# Patient Record
Sex: MALE | Race: WHITE | NOT HISPANIC OR LATINO | Employment: FULL TIME | URBAN - METROPOLITAN AREA
[De-identification: names, ages, dates, MRNs, and addresses within clinical notes are randomized per-mention and may not be internally consistent; named-entity substitution may affect disease eponyms.]

---

## 2023-03-13 LAB
ALANINE AMINOTRANSFERASE (SGPT) (U/L) IN SER/PLAS: 29 U/L (ref 10–52)
ALBUMIN (G/DL) IN SER/PLAS: 4.4 G/DL (ref 3.4–5)
ALKALINE PHOSPHATASE (U/L) IN SER/PLAS: 64 U/L (ref 33–136)
ANION GAP IN SER/PLAS: 12 MMOL/L (ref 10–20)
APPEARANCE, URINE: CLEAR
ASPARTATE AMINOTRANSFERASE (SGOT) (U/L) IN SER/PLAS: 27 U/L (ref 9–39)
BILIRUBIN TOTAL (MG/DL) IN SER/PLAS: 2.7 MG/DL (ref 0–1.2)
BILIRUBIN, URINE: NEGATIVE
BLOOD, URINE: NEGATIVE
CALCIDIOL (25 OH VITAMIN D3) (NG/ML) IN SER/PLAS: 58 NG/ML
CALCIUM (MG/DL) IN SER/PLAS: 9.8 MG/DL (ref 8.6–10.6)
CARBON DIOXIDE, TOTAL (MMOL/L) IN SER/PLAS: 31 MMOL/L (ref 21–32)
CHLORIDE (MMOL/L) IN SER/PLAS: 99 MMOL/L (ref 98–107)
CHOLESTEROL (MG/DL) IN SER/PLAS: 152 MG/DL (ref 0–199)
CHOLESTEROL IN HDL (MG/DL) IN SER/PLAS: 52.6 MG/DL
CHOLESTEROL/HDL RATIO: 2.9
COLOR, URINE: YELLOW
CREATININE (MG/DL) IN SER/PLAS: 0.95 MG/DL (ref 0.5–1.3)
ERYTHROCYTE DISTRIBUTION WIDTH (RATIO) BY AUTOMATED COUNT: 13.5 % (ref 11.5–14.5)
ERYTHROCYTE MEAN CORPUSCULAR HEMOGLOBIN CONCENTRATION (G/DL) BY AUTOMATED: 33.3 G/DL (ref 32–36)
ERYTHROCYTE MEAN CORPUSCULAR VOLUME (FL) BY AUTOMATED COUNT: 100 FL (ref 80–100)
ERYTHROCYTES (10*6/UL) IN BLOOD BY AUTOMATED COUNT: 5.15 X10E12/L (ref 4.5–5.9)
FERRITIN (UG/LL) IN SER/PLAS: 107 UG/L (ref 20–300)
GFR MALE: >90 ML/MIN/1.73M2
GLUCOSE (MG/DL) IN SER/PLAS: 84 MG/DL (ref 74–99)
GLUCOSE, URINE: NEGATIVE MG/DL
HEMATOCRIT (%) IN BLOOD BY AUTOMATED COUNT: 51.3 % (ref 41–52)
HEMOGLOBIN (G/DL) IN BLOOD: 17.1 G/DL (ref 13.5–17.5)
KETONES, URINE: ABNORMAL MG/DL
LDL: 81 MG/DL (ref 0–99)
LEUKOCYTE ESTERASE, URINE: NEGATIVE
LEUKOCYTES (10*3/UL) IN BLOOD BY AUTOMATED COUNT: 5.2 X10E9/L (ref 4.4–11.3)
NITRITE, URINE: NEGATIVE
NRBC (PER 100 WBCS) BY AUTOMATED COUNT: 0 /100 WBC (ref 0–0)
PH, URINE: 6 (ref 5–8)
PLATELETS (10*3/UL) IN BLOOD AUTOMATED COUNT: 261 X10E9/L (ref 150–450)
POTASSIUM (MMOL/L) IN SER/PLAS: 4.3 MMOL/L (ref 3.5–5.3)
PROSTATE SPECIFIC AG (NG/ML) IN SER/PLAS: 0.55 NG/ML (ref 0–4)
PROTEIN TOTAL: 6.9 G/DL (ref 6.4–8.2)
PROTEIN, URINE: NEGATIVE MG/DL
SODIUM (MMOL/L) IN SER/PLAS: 138 MMOL/L (ref 136–145)
SPECIFIC GRAVITY, URINE: 1.02 (ref 1–1.03)
THYROTROPIN (MIU/L) IN SER/PLAS BY DETECTION LIMIT <= 0.05 MIU/L: 4.11 MIU/L (ref 0.44–3.98)
THYROXINE (T4) FREE (NG/DL) IN SER/PLAS: 1.17 NG/DL (ref 0.78–1.48)
TRIGLYCERIDE (MG/DL) IN SER/PLAS: 93 MG/DL (ref 0–149)
URATE (MG/DL) IN SER/PLAS: 5.7 MG/DL (ref 4–7.5)
UREA NITROGEN (MG/DL) IN SER/PLAS: 21 MG/DL (ref 6–23)
UROBILINOGEN, URINE: <2 MG/DL (ref 0–1.9)
VLDL: 19 MG/DL (ref 0–40)

## 2023-04-04 LAB
TESTOSTERONE FREE (CHAN): 90.5 PG/ML (ref 35–155)
TESTOSTERONE,TOTAL,LC-MS/MS: 374 NG/DL (ref 250–1100)

## 2023-10-16 DIAGNOSIS — I10 HYPERTENSION, ESSENTIAL: Primary | ICD-10-CM

## 2023-10-16 DIAGNOSIS — F52.21 ED (ERECTILE DYSFUNCTION) OF NON-ORGANIC ORIGIN: ICD-10-CM

## 2023-10-16 DIAGNOSIS — R79.89 LOW TESTOSTERONE IN MALE: ICD-10-CM

## 2023-10-16 RX ORDER — TESTOSTERONE 20.25 MG/1.25G
20.25 GEL TOPICAL 4 TIMES DAILY
Qty: 150 G | Refills: 5 | Status: SHIPPED | OUTPATIENT
Start: 2023-10-16 | End: 2023-11-01 | Stop reason: SDUPTHER

## 2023-10-16 RX ORDER — LISINOPRIL AND HYDROCHLOROTHIAZIDE 20; 25 MG/1; MG/1
1 TABLET ORAL DAILY
Qty: 30 TABLET | Refills: 11 | Status: SHIPPED | OUTPATIENT
Start: 2023-10-16 | End: 2024-10-15

## 2023-10-16 RX ORDER — TADALAFIL 20 MG/1
20 TABLET ORAL ONCE AS NEEDED
Qty: 10 TABLET | Refills: 6 | Status: SHIPPED | OUTPATIENT
Start: 2023-10-16 | End: 2023-12-25

## 2023-11-01 DIAGNOSIS — R79.89 LOW TESTOSTERONE IN MALE: ICD-10-CM

## 2023-11-01 RX ORDER — TESTOSTERONE 20.25 MG/1.25G
GEL TOPICAL
Qty: 150 G | Refills: 5 | Status: SHIPPED | OUTPATIENT
Start: 2023-11-01 | End: 2024-03-22 | Stop reason: SDUPTHER

## 2023-11-21 ENCOUNTER — TELEMEDICINE (OUTPATIENT)
Dept: NEUROLOGY | Facility: CLINIC | Age: 63
End: 2023-11-21
Payer: COMMERCIAL

## 2023-11-21 DIAGNOSIS — G43.709 CHRONIC MIGRAINE W/O AURA W/O STATUS MIGRAINOSUS, NOT INTRACTABLE: Primary | ICD-10-CM

## 2023-11-21 DIAGNOSIS — F07.81 POST CONCUSSION SYNDROME: ICD-10-CM

## 2023-11-21 PROCEDURE — 99215 OFFICE O/P EST HI 40 MIN: CPT | Performed by: STUDENT IN AN ORGANIZED HEALTH CARE EDUCATION/TRAINING PROGRAM

## 2023-11-21 RX ORDER — TOPIRAMATE 25 MG/1
TABLET ORAL
Qty: 60 TABLET | Refills: 4 | Status: SHIPPED | OUTPATIENT
Start: 2023-11-21 | End: 2024-04-15 | Stop reason: WASHOUT

## 2023-11-21 RX ORDER — RIZATRIPTAN BENZOATE 10 MG/1
10 TABLET ORAL ONCE AS NEEDED
Qty: 10 TABLET | Refills: 6 | Status: SHIPPED | OUTPATIENT
Start: 2023-11-21 | End: 2024-04-15 | Stop reason: WASHOUT

## 2023-11-21 NOTE — PROGRESS NOTES
Virtual or Telephone Consent    An interactive audio and video telecommunication system which permits real time communications between the patient (at the originating site) and provider (at the distant site) was utilized to provide this telehealth service.   Verbal consent was requested and obtained from Abhishek Olson on this date, 11/21/23 for a telehealth visit.     Subjective   Abhishek Olson is a 62 y.o.   male who is being followed for persistent headache attributed to mild traumatic injury to the head and post concussive syndrome.    Since last seen, patient states that headaches have not changes. Notes vivid dreams with amitriptyline and occasional dry mouth. Has been helping with sleep, but groggy in the morning. Has been having mood swings and often more tearful. Loss of libido. Has been seeing mental health counseling and started on desvenlafaxine. Has decreased work hours to 40 hours per week and no longer traveling with team. Zolmitriptan has not been effective, but no side effect. Notes that he had 2 weeks without headache, however gradually returned to daily.    Current Outpatient Medications   Medication Instructions    lisinopriL-hydrochlorothiazide 20-25 mg tablet 1 tablet, oral, Daily    tadalafil (CIALIS) 20 mg, oral, Once as needed    testosterone 20.25 mg/1.25 gram (1.62 %) gel in metered-dose pump As directed    ZOLMitriptan (ZOMIG ZMT) 5 mg, oral, Once as needed, May repeat in 2 hours if unresolved. Do not exceed 10 mg in 24 hours.       Assessment/Plan   Patient with persistent headache attributed to mild traumatic injury to the head and post concussive syndrome. Headaches overall unchanged from last visit. Having side effect to low dose amitriptyline, without clear benefit, thus will discontinue at this time. Per our discussion, will start topiramate. For breakthrough headaches, will switch from zolmitriptan to rizatriptan. May benefit from neuropsych evaluation. Patient has given  permission to reach out Dr. Gary Tidwell (neuropsych) to discuss evaluation.     - discontinue amitriptyline, zolmitriptan, and indomethacin  - start topiramate 50mg at bedtime  - start rizatriptan 10mg PRN  - follow up in 3 months    I personally spent 47 minutes today, exclusive of procedures, providing care for this patient, including preparation, face to face time, documentation and other services such as review of medical records, diagnostic result, patient education, counseling, coordination of care as specified in the encounter.

## 2023-11-21 NOTE — PATIENT INSTRUCTIONS
Discontinue amitriptyline, zolmitriptan, and indomethacin    Will start topiramate for headache prevention and rizatriptan for breakthrough headache treatment.     Consider neuropsych evaluation. Will discuss options with Dr. Gary Tidwell     Follow up 3 months  ______________________________  You have been prescribed topiramate 25mg tablets, with the goal to titrate up to 2 tablets (50 mg) per day.    For the first week, take 1 tablet (25mg) in the evening.   If no side effects and tolerating well, increase to 2 tablets (50mg) in the evening.    It could take up the 3 months to start seeing improvement in your headaches.     The most common side effects include numbness/tingling in your fingers/toes/around mouth, loss of appetite/weight loss, metallic taste to carbonated beverages, and some cognitive slowing. More serious side effects include kidney stones and worsening depression. If any of these symptoms are experienced and not tolerated, please contact my office to discuss alternative options.  ________________________  You have been prescribed a medication called Rizatriptan at a dose of 10mg. This medication is to be taken as needed to stop a migraine. It is most effective if taken at onset of headache, with a goal of completely resolving a migraine within 2 hours. If you do not have complete resolution of headache within 2 hours, take a second tablet. Max daily dose is 3 tablets (30mg).    Please take this medication less than 10 times per month. Taken too often, there is a chance of developing medication overuse headache, which would result in a higher migraine frequency.     Common side effects include light-headed sensation, drowsiness, and restlessness. Another side effect is the sensation of chest/neck tightness that can last minutes to a few hours. You should not have any difficulty breathing or irregular heart rhythm with this side effect, and it self resolves.    Do not take this medication if  you have uncontrolled blood pressure or a history of heart attack, as it can cause vasospasm.     If you do not tolerate this medication, please discontinue its use, and we can discuss alternative acute medications at your upcoming appointment.

## 2023-12-13 ENCOUNTER — PROCEDURE VISIT (OUTPATIENT)
Dept: PSYCHOLOGY | Facility: CLINIC | Age: 63
End: 2023-12-13
Payer: COMMERCIAL

## 2023-12-13 DIAGNOSIS — G43.109 MIGRAINE WITH AURA AND WITHOUT STATUS MIGRAINOSUS, NOT INTRACTABLE: ICD-10-CM

## 2023-12-13 DIAGNOSIS — F07.81 POST CONCUSSION SYNDROME: ICD-10-CM

## 2023-12-13 DIAGNOSIS — F41.9 ANXIETY: ICD-10-CM

## 2023-12-13 DIAGNOSIS — F54 PSYCHOLOGICAL FACTOR AFFECTING PHYSICAL CONDITION: ICD-10-CM

## 2023-12-13 DIAGNOSIS — S06.0X0D CONCUSSION WITHOUT LOSS OF CONSCIOUSNESS, SUBSEQUENT ENCOUNTER: Primary | ICD-10-CM

## 2023-12-13 PROBLEM — S06.0X0A CONCUSSION WITH NO LOSS OF CONSCIOUSNESS: Status: ACTIVE | Noted: 2023-12-13

## 2023-12-13 PROCEDURE — 96139 PSYCL/NRPSYC TST TECH EA: CPT | Performed by: CLINICAL NEUROPSYCHOLOGIST

## 2023-12-13 PROCEDURE — 96116 NUBHVL XM PHYS/QHP 1ST HR: CPT | Mod: AH,GC | Performed by: CLINICAL NEUROPSYCHOLOGIST

## 2023-12-13 PROCEDURE — 96139 PSYCL/NRPSYC TST TECH EA: CPT | Mod: AH,GC | Performed by: CLINICAL NEUROPSYCHOLOGIST

## 2023-12-13 PROCEDURE — 96138 PSYCL/NRPSYC TECH 1ST: CPT | Performed by: CLINICAL NEUROPSYCHOLOGIST

## 2023-12-13 PROCEDURE — 96133 NRPSYC TST EVAL PHYS/QHP EA: CPT | Mod: AH,GC | Performed by: CLINICAL NEUROPSYCHOLOGIST

## 2023-12-13 PROCEDURE — 96132 NRPSYC TST EVAL PHYS/QHP 1ST: CPT | Mod: AH,GC | Performed by: CLINICAL NEUROPSYCHOLOGIST

## 2023-12-13 PROCEDURE — 96116 NUBHVL XM PHYS/QHP 1ST HR: CPT | Performed by: CLINICAL NEUROPSYCHOLOGIST

## 2023-12-13 PROCEDURE — 96138 PSYCL/NRPSYC TECH 1ST: CPT | Mod: AH,GC | Performed by: CLINICAL NEUROPSYCHOLOGIST

## 2023-12-13 PROCEDURE — 96132 NRPSYC TST EVAL PHYS/QHP 1ST: CPT | Performed by: CLINICAL NEUROPSYCHOLOGIST

## 2023-12-13 PROCEDURE — 96133 NRPSYC TST EVAL PHYS/QHP EA: CPT | Performed by: CLINICAL NEUROPSYCHOLOGIST

## 2023-12-13 RX ORDER — INDOMETHACIN 75 MG/1
75 CAPSULE, EXTENDED RELEASE ORAL
COMMUNITY
Start: 2023-11-02 | End: 2024-04-15 | Stop reason: WASHOUT

## 2023-12-13 RX ORDER — ESCITALOPRAM OXALATE 20 MG/1
1 TABLET ORAL DAILY
COMMUNITY
Start: 2018-05-03 | End: 2024-04-19 | Stop reason: SDUPTHER

## 2023-12-13 RX ORDER — VENLAFAXINE HYDROCHLORIDE 75 MG/1
75 CAPSULE, EXTENDED RELEASE ORAL DAILY
COMMUNITY
Start: 2023-11-19
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
## 2023-12-13 NOTE — PROGRESS NOTES
NEUROPSYCHOLOGICAL DATA SUMMARY    Name: Abhishek Olson  : 1960  MRN: [id number]    Date of Service: 23    Age: 62 y.o.  Race:   Education: 16  Preferred Hand: RH  Examiner: Gary Tidwell  Psychometrist:  Natalia Callahan    Descriptors:    T-Score Standard Score Z-Score Scaled Score %ile Rank   Exceptionally High > 70 > 130 > 2.0 > 16 > 98   Above Average 64-69 120-129 1.4-1.9 15 91-97   High Average 57-63 110-119 0.7-1.3 12-14 75-90   Average 43-56  0.6 to -0.6 8-11 25-74   Low Average 37-42 80-89 -1.3 to -0.7 6-7 9-24   Below Average 30-36 70-79 -2.0 to -1.4 4-5 2-8   Exceptionally Low < 30 < 70 < -2.0 < 4 < 2     WTAR 1 Raw  Std. Sc  %ile   Total Correct  39  110  75   FSIQ-Est  -  113  81     Wechsler Adult Intelligence Scale - IV 1   Percep Std-equi  %ile   Matrix Reasoning 85  16   Wrking Std-equi  %ile   Digit Span 100  50   Letter Number Sequence 95  37   ProcSpd Std-equi  %ile   Symbol Search 100  50   Coding 100  50   Quotient Index  %ile   Working Memory Index 97  42   Processing Speed index 100  50      Raw  Std-equi  %ile   Trail Making 2 Part A 33 (0) 99  47                Part B 56 (0) 110  75   COWA 2                Phonemic    37    94    34                 Semantic  19  102  55     HVLT-R 1 (Form 1)       Raw  Std-equiv  %ile   Trial 1 5  79  8   Trial 2 7  78  7   Trial 3 8  73  4   Total Recall  20  73  4   Learning  3       Trial 4 (Delayed Recall) 4  <55  <1   % Retained  50%  57  <1   T+  7  See Discrim. Index   F+ 2  See Discrim. Index   Discrimination Index  7  <55  <1     BVMT-R 1(Form 1)       Raw  Std-equiv  %ile   Trial 1 3  82  12   Trial 2 5  78  7   Trial 3 6  76  5   Total Recall  14  76  5   Learning  3  94  34   Delayed Recall  6  82  12   % Retained 100%  -  >16   T+  (F+) 4 (0)  See Discrim. Index   Discrimination Index  4  -  6-10   Copy Score (Optional) 12          Raw       TOMM 1 43  50  50   Dots 1 8  4.8 v. 1.6  2e   RDS 1 9  RDS-R  13        Raw   T  %ile   BDI-II 1               24       STAI 1                         State 56  71  96     Trait 62  82  100     Post Concussive Scale    (Pre) 54(15)                                           (Post) 60(17)     Test Normative References:  Test Manual  FRANCISCO Llanos., FRANCISCO Llanos, & Psychological [employer] (2004). Revised comprehensive norms for an expanded [place] battery: Demographically adjusted neuropsychological norms for  and  adults, professional manual. [employer] [place]: Psychological Assessment Resources

## 2024-02-09 DIAGNOSIS — Z00.00 VISIT FOR ANNUAL HEALTH EXAMINATION: ICD-10-CM

## 2024-02-13 ENCOUNTER — LAB REQUISITION (OUTPATIENT)
Dept: LAB | Facility: HOSPITAL | Age: 64
End: 2024-02-13
Payer: COMMERCIAL

## 2024-02-13 ENCOUNTER — HOSPITAL ENCOUNTER (OUTPATIENT)
Dept: RADIOLOGY | Facility: CLINIC | Age: 64
Discharge: HOME | End: 2024-02-13
Payer: COMMERCIAL

## 2024-02-13 DIAGNOSIS — Z00.00 VISIT FOR ANNUAL HEALTH EXAMINATION: ICD-10-CM

## 2024-02-13 DIAGNOSIS — Z00.00 ENCOUNTER FOR GENERAL ADULT MEDICAL EXAMINATION WITHOUT ABNORMAL FINDINGS: ICD-10-CM

## 2024-02-13 LAB
25(OH)D3 SERPL-MCNC: 45 NG/ML (ref 30–100)
ALBUMIN SERPL BCP-MCNC: 4.5 G/DL (ref 3.4–5)
ALP SERPL-CCNC: 69 U/L (ref 33–136)
ALT SERPL W P-5'-P-CCNC: 42 U/L (ref 10–52)
ANION GAP SERPL CALC-SCNC: 13 MMOL/L (ref 10–20)
AST SERPL W P-5'-P-CCNC: 26 U/L (ref 9–39)
BASOPHILS # BLD AUTO: 0.04 X10*3/UL (ref 0–0.1)
BASOPHILS NFR BLD AUTO: 0.7 %
BILIRUB SERPL-MCNC: 1.4 MG/DL (ref 0–1.2)
BUN SERPL-MCNC: 16 MG/DL (ref 6–23)
CALCIUM SERPL-MCNC: 10 MG/DL (ref 8.6–10.6)
CHLORIDE SERPL-SCNC: 104 MMOL/L (ref 98–107)
CHOLEST SERPL-MCNC: 225 MG/DL (ref 0–199)
CHOLESTEROL/HDL RATIO: 4.4
CO2 SERPL-SCNC: 30 MMOL/L (ref 21–32)
CREAT SERPL-MCNC: 0.97 MG/DL (ref 0.5–1.3)
EGFRCR SERPLBLD CKD-EPI 2021: 89 ML/MIN/1.73M*2
EOSINOPHIL # BLD AUTO: 0.16 X10*3/UL (ref 0–0.7)
EOSINOPHIL NFR BLD AUTO: 2.7 %
ERYTHROCYTE [DISTWIDTH] IN BLOOD BY AUTOMATED COUNT: 13.1 % (ref 11.5–14.5)
FERRITIN SERPL-MCNC: 195 NG/ML (ref 20–300)
GLUCOSE SERPL-MCNC: 104 MG/DL (ref 74–99)
HCT VFR BLD AUTO: 46.6 % (ref 41–52)
HDLC SERPL-MCNC: 51.3 MG/DL
HGB BLD-MCNC: 16 G/DL (ref 13.5–17.5)
HOLD SPECIMEN: NORMAL
IMM GRANULOCYTES # BLD AUTO: 0.01 X10*3/UL (ref 0–0.7)
IMM GRANULOCYTES NFR BLD AUTO: 0.2 % (ref 0–0.9)
LDLC SERPL CALC-MCNC: 140 MG/DL
LYMPHOCYTES # BLD AUTO: 1.79 X10*3/UL (ref 1.2–4.8)
LYMPHOCYTES NFR BLD AUTO: 30.7 %
MCH RBC QN AUTO: 33.8 PG (ref 26–34)
MCHC RBC AUTO-ENTMCNC: 34.3 G/DL (ref 32–36)
MCV RBC AUTO: 99 FL (ref 80–100)
MONOCYTES # BLD AUTO: 0.87 X10*3/UL (ref 0.1–1)
MONOCYTES NFR BLD AUTO: 14.9 %
NEUTROPHILS # BLD AUTO: 2.97 X10*3/UL (ref 1.2–7.7)
NEUTROPHILS NFR BLD AUTO: 50.8 %
NON HDL CHOLESTEROL: 174 MG/DL (ref 0–149)
NRBC BLD-RTO: 0 /100 WBCS (ref 0–0)
PLATELET # BLD AUTO: 295 X10*3/UL (ref 150–450)
POTASSIUM SERPL-SCNC: 4.5 MMOL/L (ref 3.5–5.3)
PROT SERPL-MCNC: 7.2 G/DL (ref 6.4–8.2)
RBC # BLD AUTO: 4.73 X10*6/UL (ref 4.5–5.9)
SODIUM SERPL-SCNC: 142 MMOL/L (ref 136–145)
TRIGL SERPL-MCNC: 169 MG/DL (ref 0–149)
TSH SERPL-ACNC: 3.44 MIU/L (ref 0.44–3.98)
URATE SERPL-MCNC: 5.2 MG/DL (ref 4–7.5)
VLDL: 34 MG/DL (ref 0–40)
WBC # BLD AUTO: 5.8 X10*3/UL (ref 4.4–11.3)

## 2024-02-13 PROCEDURE — 71045 X-RAY EXAM CHEST 1 VIEW: CPT

## 2024-02-13 PROCEDURE — 84443 ASSAY THYROID STIM HORMONE: CPT

## 2024-02-13 PROCEDURE — 85025 COMPLETE CBC W/AUTO DIFF WBC: CPT

## 2024-02-13 PROCEDURE — 82728 ASSAY OF FERRITIN: CPT

## 2024-02-13 PROCEDURE — 82306 VITAMIN D 25 HYDROXY: CPT

## 2024-02-13 PROCEDURE — 80061 LIPID PANEL: CPT

## 2024-02-13 PROCEDURE — 84153 ASSAY OF PSA TOTAL: CPT

## 2024-02-13 PROCEDURE — 80053 COMPREHEN METABOLIC PANEL: CPT

## 2024-02-13 PROCEDURE — 84550 ASSAY OF BLOOD/URIC ACID: CPT

## 2024-02-14 LAB — PSA SERPL-MCNC: 0.39 NG/ML

## 2024-03-05 ENCOUNTER — TELEMEDICINE (OUTPATIENT)
Dept: NEUROLOGY | Facility: CLINIC | Age: 64
End: 2024-03-05
Payer: COMMERCIAL

## 2024-03-05 DIAGNOSIS — F07.81 POST CONCUSSION SYNDROME: ICD-10-CM

## 2024-03-05 DIAGNOSIS — G43.709 CHRONIC MIGRAINE W/O AURA W/O STATUS MIGRAINOSUS, NOT INTRACTABLE: Primary | ICD-10-CM

## 2024-03-05 PROCEDURE — 99214 OFFICE O/P EST MOD 30 MIN: CPT | Performed by: STUDENT IN AN ORGANIZED HEALTH CARE EDUCATION/TRAINING PROGRAM

## 2024-03-05 NOTE — PROGRESS NOTES
Virtual or Telephone Consent    An interactive audio and video telecommunication system which permits real time communications between the patient (at the originating site) and provider (at the distant site) was utilized to provide this telehealth service.   Verbal consent was requested and obtained from Abhishek Olson on this date, 03/05/24 for a telehealth visit.     Subjective   Abhihsek Olson is a 63 y.o.   male who is being followed for persistent headache attributed to mild traumatic injury to the head vs chronic migraine without aura.     Since last seen, patient states that headaches are unchanged from prior. Topiramate does not seem to be effective. Rizatriptan seems to relieve headaches but not resolve.     Headache pain feels like a pressure sensation, bioccipital with radiation to the temples. Moderate to severe in severity, worsened with exertion. Associated photophobia, phonophobia. Denies NV. Has some mild vertigo when turning head fast. Headaches seem to be constant since onset, 30/30 HA days per month.     Has retired from work.     Current Outpatient Medications   Medication Instructions    escitalopram (Lexapro) 20 mg tablet 1 tablet, oral, Daily    indomethacin SR (INDOCIN SR) 75 mg, oral, 2 times daily with meals    lisinopriL-hydrochlorothiazide 20-25 mg tablet 1 tablet, oral, Daily    rizatriptan (MAXALT) 10 mg, oral, Once as needed, May repeat in 2 hours if unresolved. Do not exceed 30 mg in 24 hours.    tadalafil (CIALIS) 20 mg, oral, Once as needed    testosterone 20.25 mg/1.25 gram (1.62 %) gel in metered-dose pump As directed    topiramate (Topamax) 25 mg tablet Use 25mg at night for 1 week, then increase to 50mg at night.    venlafaxine XR (EFFEXOR-XR) 75 mg, oral, Daily       Assessment/Plan   Patient with chronic migraine w/o aura vs persistent headache attributed to mild traumatic injury to the head. Per our discussion, has not had adequate response to amitriptyline nor topiramate.  Will aim to start Botox for migraine ppx. For breakthrough headaches, will continue rizatriptan.     - start Botox PREEMPT  - continue rizatriptan 10mg PRN    I personally spent 33 minutes today, exclusive of procedures, providing care for this patient, including preparation, face to face time, documentation and other services such as review of medical records, diagnostic result, patient education, counseling, coordination of care as specified in the encounter.

## 2024-03-22 DIAGNOSIS — R79.89 LOW TESTOSTERONE IN MALE: ICD-10-CM

## 2024-03-22 RX ORDER — TESTOSTERONE 20.25 MG/1.25G
GEL TOPICAL
Qty: 150 G | Refills: 0 | Status: SHIPPED | OUTPATIENT
Start: 2024-03-22

## 2024-04-15 ENCOUNTER — PROCEDURE VISIT (OUTPATIENT)
Dept: NEUROLOGY | Facility: CLINIC | Age: 64
End: 2024-04-15
Payer: COMMERCIAL

## 2024-04-15 DIAGNOSIS — G43.709 CHRONIC MIGRAINE WITHOUT AURA WITHOUT STATUS MIGRAINOSUS, NOT INTRACTABLE: Primary | ICD-10-CM

## 2024-04-15 PROCEDURE — 64615 CHEMODENERV MUSC MIGRAINE: CPT | Performed by: STUDENT IN AN ORGANIZED HEALTH CARE EDUCATION/TRAINING PROGRAM

## 2024-04-15 RX ORDER — HYDROXYZINE PAMOATE 25 MG/1
CAPSULE ORAL EVERY 12 HOURS
COMMUNITY
Start: 2020-09-09

## 2024-04-15 RX ORDER — FLUTICASONE PROPIONATE 50 MCG
SPRAY, SUSPENSION (ML) NASAL
COMMUNITY
Start: 2018-10-15

## 2024-04-15 RX ORDER — CLINDAMYCIN HYDROCHLORIDE 300 MG/1
CAPSULE ORAL
COMMUNITY
Start: 2023-10-12

## 2024-04-15 RX ORDER — TESTOSTERONE 20.25 MG/1.25G
GEL TOPICAL
COMMUNITY
Start: 2022-02-07

## 2024-04-15 RX ORDER — MULTIVITAMIN
1 TABLET ORAL
COMMUNITY

## 2024-04-15 RX ORDER — LISINOPRIL AND HYDROCHLOROTHIAZIDE 20; 25 MG/1; MG/1
1 TABLET ORAL DAILY
COMMUNITY
Start: 2020-06-15

## 2024-04-15 RX ORDER — TERBINAFINE HYDROCHLORIDE 250 MG/1
TABLET ORAL
COMMUNITY
Start: 2022-03-07

## 2024-04-15 RX ORDER — ACETAMINOPHEN AND CODEINE PHOSPHATE 300; 30 MG/1; MG/1
TABLET ORAL
COMMUNITY
Start: 2022-12-05

## 2024-04-15 RX ORDER — SODIUM, POTASSIUM,MAG SULFATES 17.5-3.13G
SOLUTION, RECONSTITUTED, ORAL ORAL
COMMUNITY
Start: 2022-02-10

## 2024-04-15 RX ORDER — AMITRIPTYLINE HYDROCHLORIDE 10 MG/1
10 TABLET, FILM COATED ORAL NIGHTLY
COMMUNITY
End: 2024-04-15 | Stop reason: WASHOUT

## 2024-04-15 RX ORDER — LISINOPRIL 40 MG/1
TABLET ORAL
COMMUNITY
Start: 2015-07-30

## 2024-04-15 RX ORDER — DIVALPROEX SODIUM 250 MG/1
1 TABLET, DELAYED RELEASE ORAL 2 TIMES DAILY
COMMUNITY
Start: 2024-03-25

## 2024-04-15 RX ORDER — ASPIRIN 81 MG/1
TABLET ORAL
COMMUNITY
Start: 2020-05-26

## 2024-04-15 RX ORDER — TRAMADOL HYDROCHLORIDE 50 MG/1
TABLET ORAL EVERY 6 HOURS
COMMUNITY
Start: 2020-05-26

## 2024-04-15 RX ORDER — MUPIROCIN 20 MG/G
OINTMENT TOPICAL EVERY 12 HOURS
COMMUNITY
Start: 2019-04-03

## 2024-04-15 RX ORDER — METHYLPREDNISOLONE 4 MG/1
TABLET ORAL
COMMUNITY
Start: 2023-08-13

## 2024-04-15 RX ORDER — PREDNISONE 20 MG/1
TABLET ORAL
COMMUNITY
Start: 2018-10-25

## 2024-04-15 RX ORDER — MELOXICAM 15 MG/1
15 TABLET ORAL DAILY
COMMUNITY

## 2024-04-15 RX ORDER — NAPROXEN 375 MG/1
1 TABLET ORAL EVERY 12 HOURS
COMMUNITY
Start: 2020-05-26

## 2024-04-15 RX ORDER — ESOMEPRAZOLE MAGNESIUM 40 MG/1
1 CAPSULE, DELAYED RELEASE ORAL 2 TIMES DAILY
COMMUNITY
Start: 2022-08-22

## 2024-04-15 RX ORDER — ROSUVASTATIN CALCIUM 20 MG/1
1 TABLET, COATED ORAL
COMMUNITY
Start: 2017-09-28

## 2024-04-15 RX ORDER — SILDENAFIL 50 MG/1
TABLET, FILM COATED ORAL
COMMUNITY
Start: 2018-07-23

## 2024-04-15 RX ORDER — ONDANSETRON 4 MG/1
TABLET, FILM COATED ORAL
COMMUNITY
Start: 2020-05-26

## 2024-04-15 RX ORDER — OMEPRAZOLE 20 MG/1
CAPSULE, DELAYED RELEASE ORAL
COMMUNITY
Start: 2019-05-03

## 2024-04-15 RX ORDER — DOCUSATE SODIUM 100 MG/1
CAPSULE, LIQUID FILLED ORAL EVERY 12 HOURS
COMMUNITY
Start: 2020-05-26

## 2024-04-15 RX ORDER — OXYCODONE AND ACETAMINOPHEN 5; 325 MG/1; MG/1
TABLET ORAL
COMMUNITY
Start: 2020-05-26

## 2024-04-15 RX ORDER — MAGNESIUM CARB/ALUMINUM HYDROX 105-160MG
TABLET,CHEWABLE ORAL
COMMUNITY
Start: 2022-08-22

## 2024-04-15 NOTE — PROGRESS NOTES
Neurology Botulinum Injection    Subjective   Abhishek Olson is an 63 y.o. male here for medical botulinum injection for Chronic migraine without aura without status migrainosus, not intractable [G43.569].     Patient continues to have daily headaches 30/30 HA days per month. No change in headache phenotype. Recently was in [place] and headaches seemed to be a little better. Would like to proceed with Botox today.     Self discontinue topiramate as it was not helping. Was recently seen at [facility] for workmans comp, and was to be start VPA, however has not until he discussed with me. Per our discussion, will hold off on starting VPA for now as he is to start Botox today.     Botox    Date/Time: 4/15/2024 10:13 AM    Performed by: Jens Boone DO  Authorized by: Jens Boone DO    Procedure specific details:      Procedure Note: PREEMPT Botox    Indications: Chronic Migraine    Informed consent was obtained (explaining the procedure and risks and benefits of procedure) from patient: the signed consent form was placed in the medical record.  A time out was completed, verifying correct patient, procedure,site, positioning, and implants or special equipment.    200 units of OnabotulinumtoxinA were reconstituted with saline. Sites of injection were identified via PREEMPT protocol and cleaned with alcohol pads. Using a 30 gauge needle, patient received following injections:    5 units in procerus  5 units in each left and right   10 units divided between 2 sites of L frontalis  10 units divided between 2 sites of R frontalis  20 units divided between 4 sites of L temporalis  20 units divided between 4 sites of R temporalis  15 units divided between 3 sites of L occipitalis  15 units divided between 3 sites of R occipitalis  10 units divided between 2 sites of L paracervical muscles  10 units divided between 2 sites of R paracervical muscles  15 units divided between 3 sites of L trapezius  15 units divided  between 3 sites of R trapezius    Additional Sites:  none    Used: 155u  Wasted: 45u    There were no complications. Patient was comfortable and left without complaint.     OnabotulinumtoxinA  Lot #: [id number]  Exp: 6/2026

## 2024-04-19 ENCOUNTER — TELEPHONE (OUTPATIENT)
Dept: ORTHOPEDIC SURGERY | Facility: CLINIC | Age: 64
End: 2024-04-19
Payer: COMMERCIAL

## 2024-04-19 DIAGNOSIS — F41.9 ANXIETY: Primary | ICD-10-CM

## 2024-04-19 RX ORDER — ESCITALOPRAM OXALATE 20 MG/1
20 TABLET ORAL DAILY
Qty: 30 TABLET | Refills: 11 | Status: SHIPPED | OUTPATIENT
Start: 2024-04-19 | End: 2025-04-19

## 2024-06-21 ENCOUNTER — OFFICE VISIT (OUTPATIENT)
Dept: ORTHOPEDIC SURGERY | Facility: HOSPITAL | Age: 64
End: 2024-06-21
Payer: COMMERCIAL

## 2024-06-21 DIAGNOSIS — M17.11 PRIMARY OSTEOARTHRITIS OF RIGHT KNEE: Primary | ICD-10-CM

## 2024-06-21 PROCEDURE — 99213 OFFICE O/P EST LOW 20 MIN: CPT | Performed by: ORTHOPAEDIC SURGERY

## 2024-06-24 ENCOUNTER — HOSPITAL ENCOUNTER (OUTPATIENT)
Dept: RADIOLOGY | Facility: EXTERNAL LOCATION | Age: 64
Discharge: HOME | End: 2024-06-24
Payer: COMMERCIAL

## 2024-07-08 ENCOUNTER — OFFICE VISIT (OUTPATIENT)
Dept: ORTHOPEDIC SURGERY | Facility: CLINIC | Age: 64
End: 2024-07-08
Payer: COMMERCIAL

## 2024-07-08 DIAGNOSIS — M17.11 PRIMARY OSTEOARTHRITIS OF RIGHT KNEE: Primary | ICD-10-CM

## 2024-07-08 PROCEDURE — 99214 OFFICE O/P EST MOD 30 MIN: CPT | Performed by: FAMILY MEDICINE

## 2024-07-08 NOTE — PROGRESS NOTES
Sports Medicine Office Note    Today's Date:  07/08/2024     HPI: Abhishek Olson is a 63 y.o. 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    Today, 7/8/2024, he complains of with persistent anterior lateral right knee pain.  This is recently improved after receiving a local cortisone injection from a [facility] surgeon on 4/12/2024 and again by Dr. Edmund Nelson on 6/21/2024.  Dr. Nelson recommend that he see me to discuss gel injections.  He has had both hips replaced and 1 with revision due to previous traumas.  He is trying to hold off on knee replacement as long as possible.  He does not really have much discomfort from his left knee today.    He has no other complaints.    Physical Examination:     The RIGHT knee has trace joint effusion. Patella crepitus and grind are positive. There is minimal tenderness to the medial and lateral joint lines. Flexion and extension are without mechanical blocking. There is no instability with stress testing.   The LEFT knee is without joint effusion. Patella crepitus and grind are positive. There is no tenderness to the medial and lateral joint lines. Flexion and extension are without mechanical blocking. There is no instability with stress testing.   Skin - no rashes, sores, or open lesions. Strength, sensory and vascular exams are otherwise normal. There is no clubbing, cyanosis or edema.  Gait is slightly antalgic and tandem.    Imaging:  Radiographs of the right and left knees obtained from [facility] on 4/12/2024 were reviewed and revealed near bone-on-bone patellofemoral compartment arthrosis at the right knee with moderate findings at the left.  There is mild to moderate medial lateral compartment arthrosis on the right knee with none on the left cute fractures or dislocations.  The studies were reviewed by me personally in the office today.      Problem List Items Addressed  This Visit    None  Visit Diagnoses         Codes    Primary osteoarthritis of right knee    -  Primary M17.11            Assessment and Plan:     We reviewed the exam and x-ray findings and discussed the conservative and surgical treatment options. We agreed to pursue additional injection options with either PRP with/without HA or HA by itself.  He was given information to review.  He understands PRP is not covered by health insurance and would be an out-of-pocket expense.  He was given the number of my  to call at his leisure to pursue insurance approval for HA.  Activity modifications and maximizing APAP and Voltaren gel reviewed.  I am happy to see him back for injections.    **This note was dictated using Dragon speech recognition software and was not corrected for spelling or grammatical errors**.    Cedrick Hernandez MD  Sports Medicine Specialist  [facility] [place]

## 2024-07-09 DIAGNOSIS — F41.9 ANXIETY: ICD-10-CM

## 2024-07-09 PROCEDURE — 2500000005 HC RX 250 GENERAL PHARMACY W/O HCPCS: Performed by: ORTHOPAEDIC SURGERY

## 2024-07-09 PROCEDURE — 2500000004 HC RX 250 GENERAL PHARMACY W/ HCPCS (ALT 636 FOR OP/ED): Performed by: ORTHOPAEDIC SURGERY

## 2024-07-09 PROCEDURE — 20610 DRAIN/INJ JOINT/BURSA W/O US: CPT | Mod: RT | Performed by: ORTHOPAEDIC SURGERY

## 2024-07-09 RX ORDER — METHYLPREDNISOLONE ACETATE 40 MG/ML
40 INJECTION, SUSPENSION INTRA-ARTICULAR; INTRALESIONAL; INTRAMUSCULAR; SOFT TISSUE
Status: COMPLETED | OUTPATIENT
Start: 2024-07-09 | End: 2024-07-09

## 2024-07-09 RX ORDER — ESCITALOPRAM OXALATE 20 MG/1
20 TABLET ORAL DAILY
Qty: 30 TABLET | Refills: 11 | Status: SHIPPED | OUTPATIENT
Start: 2024-07-09 | End: 2024-07-09 | Stop reason: SDUPTHER

## 2024-07-09 RX ORDER — ESCITALOPRAM OXALATE 20 MG/1
20 TABLET ORAL DAILY
Qty: 30 TABLET | Refills: 11 | Status: SHIPPED | OUTPATIENT
Start: 2024-07-09 | End: 2025-07-09

## 2024-07-09 RX ORDER — LIDOCAINE HYDROCHLORIDE 10 MG/ML
4 INJECTION INFILTRATION; PERINEURAL
Status: COMPLETED | OUTPATIENT
Start: 2024-07-09 | End: 2024-07-09

## 2024-07-09 NOTE — PROGRESS NOTES
Patient comes to see me today.  He has a known history of right knee osteoarthritis he has had injections in the past he wants to try and do a cortisone shot and then get scheduled for viscosupplementation I think that is reasonable.    Their limb is warm, and well-perfused.  They have intact sensation to light touch in all lower extremity dermatomes.  Their quadriceps and hamstring strength is 5 of 5.  Their skin is intact.  Their limb is stable from a ligament standpoint  There is a trace effusion.  There is one out of 3 patellofemoral crepitus    Range of motion is preserved    The patient has known degenerative changes evident on radiographs.  They have failed other conservative measures including multiple anti-inflammatory, home exercises, activity modification and icing.  Also not seen significant improvement with corticosteroid injections.  At this point I would recommend a series of Visco supplementation.    X-rays show osteoarthritis of his knee    Patient ID: Abhishek Olson is a 63 y.o. male.    L Inj/Asp: R knee on 7/9/2024 4:29 PM  Indications: pain  Details: 22 G needle, anterior approach  Medications: 40 mg methylPREDNISolone acetate 40 mg/mL; 4 mL lidocaine 10 mg/mL (1 %)    Under sterile conditions the right knee was injected with 4cc of lidocaine 40mg DepoMedrol.  The patient tolerated the procedure well.  There were no apparent complications.  Sterile bandage was applied.  Procedure, treatment alternatives, risks and benefits explained, specific risks discussed. Consent was given by the patient. Immediately prior to procedure a time out was called to verify the correct patient, procedure, equipment, support staff and site/side marked as required. Patient was prepped and draped in the usual sterile fashion.

## 2024-07-22 ENCOUNTER — APPOINTMENT (OUTPATIENT)
Dept: NEUROLOGY | Facility: CLINIC | Age: 64
End: 2024-07-22
Payer: COMMERCIAL

## 2024-07-25 ENCOUNTER — APPOINTMENT (OUTPATIENT)
Dept: ORTHOPEDIC SURGERY | Facility: HOSPITAL | Age: 64
End: 2024-07-25
Payer: COMMERCIAL

## 2024-07-26 ENCOUNTER — OFFICE VISIT (OUTPATIENT)
Dept: ORTHOPEDIC SURGERY | Facility: HOSPITAL | Age: 64
End: 2024-07-26
Payer: COMMERCIAL

## 2024-07-26 ENCOUNTER — APPOINTMENT (OUTPATIENT)
Dept: NEUROLOGY | Facility: CLINIC | Age: 64
End: 2024-07-26
Payer: COMMERCIAL

## 2024-07-26 ENCOUNTER — HOSPITAL ENCOUNTER (OUTPATIENT)
Dept: RADIOLOGY | Facility: EXTERNAL LOCATION | Age: 64
Discharge: HOME | End: 2024-07-26

## 2024-07-26 DIAGNOSIS — M17.11 PRIMARY OSTEOARTHRITIS OF RIGHT KNEE: ICD-10-CM

## 2024-07-26 PROCEDURE — 20611 DRAIN/INJ JOINT/BURSA W/US: CPT | Mod: RT | Performed by: FAMILY MEDICINE

## 2024-07-26 PROCEDURE — 2500000004 HC RX 250 GENERAL PHARMACY W/ HCPCS (ALT 636 FOR OP/ED): Mod: JZ | Performed by: FAMILY MEDICINE

## 2024-07-26 PROCEDURE — 0232T NJX PLATELET PLASMA: CPT | Performed by: FAMILY MEDICINE

## 2024-07-26 NOTE — PROGRESS NOTES
Patient is here for right knee PRP Patient ID: Abhishek Olson is a 63 y.o. male.    [id number]  R knee PRP on 7/26/2024 9:57 AM  Indications: pain  Details: 21 G needle, ultrasound-guided superolateral approach  Outcome: tolerated well, no immediate complications  Procedure, treatment alternatives, risks and benefits explained, specific risks discussed. Consent was given by the patient. Immediately prior to procedure a time out was called to verify the correct patient, procedure, equipment, support staff and site/side marked as required. Patient was prepped and draped in the usual sterile fashion.           Sports Medicine Office Note    Today's Date:  07/26/2024     HPI: Abhishek Olson is a 63 y.o. 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    On 7/8/2024, he complains of with persistent anterior lateral right knee pain.  This is recently improved after receiving a local cortisone injection from a [facility] surgeon on 4/12/2024 and again by Dr. Edmund Nelson on 6/21/2024.  Dr. Nelson recommend that he see me to discuss gel injections.  He has had both hips replaced and 1 with revision due to previous traumas.  He is trying to hold off on knee replacement as long as possible.  He does not really have much discomfort from his left knee today.  We agreed to pursue additional injection options with either PRP with/without HA or HA by itself.  He was given information to review.  He understands PRP is not covered by health insurance and would be an out-of-pocket expense.  He was given the number of my  to call at his leisure to pursue insurance approval for HA.  Activity modifications and maximizing APAP and Voltaren gel reviewed.  I am happy to see him back for injections.    Today, 7/26/2024, he returns for follow-up of chronic right knee DJD pain and to start a trial of PRP with viscosupplementation.  His brother  is in attendance with him today.  He denies interval injury or trauma.  He is requesting long-term analgesia.    He has no other complaints.    Physical Examination:     The RIGHT knee has trace joint effusion. Patella crepitus and grind are positive. There is minimal tenderness to the medial and lateral joint lines. Flexion and extension are without mechanical blocking. There is no instability with stress testing.   The LEFT knee is without joint effusion. Patella crepitus and grind are positive. There is no tenderness to the medial and lateral joint lines. Flexion and extension are without mechanical blocking. There is no instability with stress testing.   Skin - no rashes, sores, or open lesions. Strength, sensory and vascular exams are otherwise normal. There is no clubbing, cyanosis or edema.  Gait is slightly antalgic and tandem.    Imaging:  Radiographs of the right and left knees obtained from [facility] on 4/12/2024 were reviewed and revealed near bone-on-bone patellofemoral compartment arthrosis at the right knee with moderate findings at the left.  There is mild to moderate medial lateral compartment arthrosis on the right knee with none on the left cute fractures or dislocations.  The studies were reviewed by me personally in the office today.    Procedure:  Procedure #1:  Under sterile technique, phlebotomy was performed at the right antecubital fossa producing 52 mL of whole blood. The patient tolerated this well and the area was cleaned and bandaged. The whole blood was  was added to 8 mL of ACD-A then processed in the centrifuge.    Procedure #2:   After consent was obtained, the right knee was prepped in a sterile fashion. Ultrasound guidance was used to help insure proper needle placement into the knee joint, decrease patient discomfort, and decrease collateral damage. The area was aspirated of 12 mL of slightly oranges colored joint fluid and PRP 2.8 mL was injected, along with Synvisc 1, without any  complications. Ultrasound images were saved on an internal file for later reference.The patient tolerated the procedure well and the area was cleaned and bandaged.      Problem List Items Addressed This Visit    None  Visit Diagnoses         Codes    Primary osteoarthritis of right knee     M17.11            Assessment and Plan:     We reviewed the exam findings and discussed the conservative and surgical treatment options. We agreed to start [place]/PRP treatment today. We were successful obtaining blood specimen out of the right antecubital fossa. PRP was injected into the right knee, along with Synvisc 1, without any complications. We reviewed the post procedure protocol including nonweightbearing, crutch assisted ambulation for 2 days and then progressing off the crutches as tolerated. The patient was given tramadol for breakthrough pain; and will follow-up in one month for recheck.    **This note was dictated using Dragon speech recognition software and was not corrected for spelling or grammatical errors**.    Cedrick Hernandez MD  Sports Medicine Specialist  [facility] [place]

## 2024-08-14 DIAGNOSIS — Z12.5 PROSTATE CANCER SCREENING: ICD-10-CM

## 2024-08-14 DIAGNOSIS — R79.89 LOW TESTOSTERONE: ICD-10-CM

## 2024-08-15 ENCOUNTER — LAB (OUTPATIENT)
Dept: LAB | Facility: LAB | Age: 64
End: 2024-08-15
Payer: COMMERCIAL

## 2024-08-15 DIAGNOSIS — Z12.5 PROSTATE CANCER SCREENING: ICD-10-CM

## 2024-08-15 DIAGNOSIS — Z00.00 VISIT FOR ANNUAL HEALTH EXAMINATION: ICD-10-CM

## 2024-08-15 DIAGNOSIS — R79.89 LOW TESTOSTERONE: ICD-10-CM

## 2024-08-15 LAB
HCT VFR BLD AUTO: 44.4 % (ref 41–52)
LH SERPL-ACNC: 0.2 IU/L
PROLACTIN SERPL-MCNC: 10.2 UG/L (ref 2–18)
PSA SERPL-MCNC: 0.43 NG/ML
TESTOST SERPL-MCNC: 319 NG/DL (ref 240–1000)

## 2024-08-15 PROCEDURE — 85014 HEMATOCRIT: CPT

## 2024-08-15 PROCEDURE — 84153 ASSAY OF PSA TOTAL: CPT

## 2024-08-15 PROCEDURE — 36415 COLL VENOUS BLD VENIPUNCTURE: CPT

## 2024-08-15 PROCEDURE — 84403 ASSAY OF TOTAL TESTOSTERONE: CPT

## 2024-08-15 PROCEDURE — 83002 ASSAY OF GONADOTROPIN (LH): CPT

## 2024-08-15 PROCEDURE — 84146 ASSAY OF PROLACTIN: CPT

## 2024-08-22 ENCOUNTER — TELEMEDICINE (OUTPATIENT)
Dept: UROLOGY | Facility: HOSPITAL | Age: 64
End: 2024-08-22
Payer: COMMERCIAL

## 2024-08-22 ENCOUNTER — APPOINTMENT (OUTPATIENT)
Dept: UROLOGY | Facility: HOSPITAL | Age: 64
End: 2024-08-22
Payer: COMMERCIAL

## 2024-08-22 DIAGNOSIS — R79.89 LOW TESTOSTERONE IN MALE: Primary | ICD-10-CM

## 2024-08-22 PROCEDURE — 99214 OFFICE O/P EST MOD 30 MIN: CPT | Performed by: UROLOGY

## 2024-08-22 NOTE — PROGRESS NOTES
FUV    Virtual or Telephone Consent    An interactive audio and video telecommunication system which permits real time communications between the patient (at the originating site) and provider (at the distant site) was utilized to provide this telehealth service.   Verbal consent was requested and obtained from Abhishek Olson on this date, 08/22/24 for a telehealth visit.      Last seen - 9/9/23     HISTORY OF PRESENT ILLNESS:   Abhishek Olson is a 63 y.o. male who is being seen today for fuv    Has been on androgel for a few years.  Wants to switch to injections    Labs 8/15/24   T 319  H 44.4  PSA 0.43  LH 0.2    PAST MEDICAL HISTORY:  Past Medical History:   Diagnosis Date    Essential (primary) hypertension 05/11/2022    Hypertension    Mixed hyperlipidemia     Elevated cholesterol with high triglycerides    Other specified health status 03/31/2015    No pertinent past medical history    Other specified postprocedural states 06/07/2016    History of colonoscopy    Personal history of other diseases of urinary system     History of kidney problems       PAST SURGICAL HISTORY:  Past Surgical History:   Procedure Laterality Date    OTHER SURGICAL HISTORY  05/03/2019    Hernia repair    OTHER SURGICAL HISTORY  05/03/2019    Orbital blowout fracture repair    OTHER SURGICAL HISTORY  05/03/2019    Hip surgery    SHOULDER SURGERY  03/31/2015    Shoulder Surgery        ALLERGIES:   Allergies   Allergen Reactions    Penicillins Rash        MEDICATIONS:   Current Outpatient Medications   Medication Instructions    aluminum hydrox-magnesium carb (Gaviscon Extra Strength) 160-105 mg tablet,chewable oral    aspirin 81 mg EC tablet oral    clindamycin (Cleocin) 300 mg capsule TAKE 2 CAPSULES BY MOUTH ONE HOUR PRIOR TO DENTAL VISIT    divalproex (Depakote) 250 mg EC tablet 1 tablet, oral, 2 times daily    docusate sodium (Colace) 100 mg capsule oral, Every 12 hours    escitalopram (LEXAPRO) 20 mg, oral, Daily    esomeprazole  (NexIUM) 40 mg DR capsule 1 capsule, oral, 2 times daily    fluticasone (Flonase) 50 mcg/actuation nasal spray nasal    hydrOXYzine pamoate (Vistaril) 25 mg capsule oral, Every 12 hours    indomethacin SR (INDOCIN SR) 75 mg, oral, 2 times daily PRN    lisinopril 40 mg tablet oral    lisinopriL-hydrochlorothiazide 20-25 mg tablet 1 tablet, oral, Daily    lisinopriL-hydrochlorothiazide 20-25 mg tablet 1 tablet, oral, Daily    meloxicam (MOBIC) 15 mg, oral, Daily, Take with food.    methylPREDNISolone (Medrol) 4 mg tablet oral    mupirocin (Bactroban) 2 % ointment Every 12 hours    naproxen (Naprosyn) 375 mg tablet 1 tablet, oral, Every 12 hours    omeprazole (PriLOSEC) 20 mg DR capsule oral, Daily RT    ondansetron (Zofran) 4 mg tablet oral    predniSONE (Deltasone) 20 mg tablet oral, Daily RT    rosuvastatin (Crestor) 20 mg tablet 1 tablet, oral, Daily RT    sildenafil (Viagra) 50 mg tablet oral    sodium,potassium,mag sulfates (Suprep Bowel Prep Kit) 17.5-3.13-1.6 gram recon soln solution oral    tadalafil (CIALIS) 20 mg, oral, Once as needed    terbinafine (LamISIL) 250 mg tablet oral, Daily RT    testosterone 20.25 mg/1.25 gram (1.62 %) gel in metered-dose pump apply 4 pumps topically to the upper arm once daily    testosterone 20.25 mg/1.25 gram (1.62 %) gel in metered-dose pump transdermal    topiramate (Topamax) 25 mg tablet TAKE 1 TABLET BY MOUTH AT NIGHT FOR 1 WEEK  THEN INCREASE TO 2 TABLETS AT NIGHT.    venlafaxine XR (EFFEXOR-XR) 75 mg, oral, Daily    ZOLMitriptan (Zomig-ZMT) 5 mg disintegrating tablet oral        PHYSICAL EXAM:  Constitutional: Patient appears well-developed and well-nourished. No distress.        Labs  Lab Results   Component Value Date    TESTOSTERONE 319 08/15/2024    TESTOSTERONE 631 08/31/2022    TESTOSTERONE 388 03/11/2022     Lab Results   Component Value Date    LH 0.2 08/15/2024     Lab Results   Component Value Date    FSH 4.5 01/28/2022     Lab Results   Component Value Date     ESTRADIOL 38 08/31/2022     Lab Results   Component Value Date    PSA 0.43 08/15/2024     Lab Results   Component Value Date    PROLACTIN 10.2 08/15/2024     Lab Results   Component Value Date    GFRMALE >90 03/12/2023     Lab Results   Component Value Date    CREATININE 0.97 02/13/2024     Lab Results   Component Value Date    CHOL 225 (H) 02/13/2024     Lab Results   Component Value Date    HDL 51.3 02/13/2024     Lab Results   Component Value Date    CHHDL 4.4 02/13/2024     Lab Results   Component Value Date    LDLF 81 03/12/2023     Lab Results   Component Value Date    VLDL 34 02/13/2024     Lab Results   Component Value Date    TRIG 169 (H) 02/13/2024     Lab Results   Component Value Date    TESTF 90.5 03/29/2023     Lab Results   Component Value Date    HCT 44.4 08/15/2024       Assessment:      1. Low testosterone in male          Abhishek Olson is a 63 y.o. male here for FUV     Plan:   Testosterone replacement therapy    Will switch to xyosted 75mg weekly    The patient's lab work and clinical symptoms suggest that he has hypogonadism or testosterone deficiency.  I had a long discussion with the patient about the treatment options of his condition. This included lifestyle modification as well as supplementation.   Exogenous testosterone as well as the use of selective estrogen receptor modulators (SERMs), aromatase inhibitors, human gonadotropins were discussed.  The several treatment options including various formulations were discussed. I explained the risks, benefits, mechanisms, and use of each therapy.  I described potential side effects of treatment. Common side effects include acne, erythrocytosis, breast tenderness, and changes in behavior.  I highlighted the effect of exogenous testosterone on the reproductive system, including decrease in testicular volume and the negative affect on sperm production which may result in sterility, which could be permanent.    We discussed that current data  suggest that external testosterone therapy does not cause an increase in the risk of developing prostate cancer, and can also be used safely in patients after prostate cancer treatment.  It may however cause an increase in prostate volume and PSA.  We discussed that the there is controversy in replacement therapies effect on the cardiovascular system.     We also discussed the precautions necessary to keep the medication from contacting others, especially women and children, for whom contact with the medication can be particularly harmful.   He will use soap and water to wash his hands after application and will clean his skin before having skin-to-skin contact with others if he chooses gel application delivery of testosterone.  A monitoring schedule was discussed with includes routine evaluation of serum testosterone, hematocrit, lipid panel, and PSA.  No barriers to learning were identified.  After all of the patient's questions were satisfactorily answered, he expressed understanding of the risks of therapy.    I have personally reviewed the OARRS report for this patient. This report is scanned into the electronic medical record. I have considered the  risks of abuse, dependence, addiction and diversion.

## 2024-08-23 DIAGNOSIS — R79.89 LOW TESTOSTERONE IN MALE: ICD-10-CM

## 2024-08-23 RX ORDER — TESTOSTERONE ENANTHATE 75 MG/.5ML
75 INJECTION SUBCUTANEOUS
Qty: 4 EACH | Refills: 5 | Status: SHIPPED | OUTPATIENT
Start: 2024-08-23

## 2024-09-09 ENCOUNTER — OFFICE VISIT (OUTPATIENT)
Dept: ORTHOPEDIC SURGERY | Facility: CLINIC | Age: 64
End: 2024-09-09
Payer: COMMERCIAL

## 2024-09-09 DIAGNOSIS — M76.32 IT BAND SYNDROME, LEFT: Primary | ICD-10-CM

## 2024-09-09 DIAGNOSIS — M17.11 PRIMARY OSTEOARTHRITIS OF RIGHT KNEE: ICD-10-CM

## 2024-09-09 PROCEDURE — 99213 OFFICE O/P EST LOW 20 MIN: CPT | Performed by: FAMILY MEDICINE

## 2024-09-09 NOTE — PROGRESS NOTES
Patient ID: Abhishek Olson is a 63 y.o. male.    Procedures      Sports Medicine Office Note    Today's Date:  09/09/2024     HPI: Abhishek Olson is a 63 y.o. 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    Procedure Note:  After consent was obtained, the RIGHT knee was prepped in a sterile fashion. Ultrasound guidance was used to help insure proper needle placement into the knee joint, decrease patient discomfort, and decrease collateral damage. The joint was visualized and Synvisc 1, with PRP, was injected without any complications. Ultrasound images were saved on an internal file for later reference. The patient tolerated the procedure well and the area was cleaned and bandaged.      Problem List Items Addressed This Visit    None      **This note was dictated using Dragon speech recognition software and was not corrected for spelling or grammatical errors**.    Cedrick Hernandez MD  Sports Medicine Specialist  [facility] [place]

## 2024-09-09 NOTE — PROGRESS NOTES
Patient is here for right knee PRP Patient ID: Abhishek Olson is a 63 y.o. male.    Sports Medicine Office Note    Today's Date:  09/09/2024     HPI: Abhishek Olson is a 63 y.o. recently retired professional football  for the [employer] who presents today for follow-up of chronic right knee DJD pain.    On 7/8/2024, he complains of with persistent anterior lateral right knee pain.  This is recently improved after receiving a local cortisone injection from a [facility] surgeon on 4/12/2024 and again by Dr. Edmund Nelson on 6/21/2024.  Dr. Nelson recommend that he see me to discuss gel injections.  He has had both hips replaced and 1 with revision due to previous traumas.  He is trying to hold off on knee replacement as long as possible.  He does not really have much discomfort from his left knee today.  We agreed to pursue additional injection options with either PRP with/without HA or HA by itself.  He was given information to review.  He understands PRP is not covered by health insurance and would be an out-of-pocket expense.  He was given the number of my  to call at his leisure to pursue insurance approval for HA.  Activity modifications and maximizing APAP and Voltaren gel reviewed.  I am happy to see him back for injections.    7/26/2024, he returns for follow-up of chronic right knee DJD pain and to start a trial of PRP with viscosupplementation.  His brother is in attendance with him today.  He denies interval injury or trauma.  He is requesting long-term analgesia.  We agreed to start [place]/PRP treatment today. We were successful obtaining blood specimen out of the right antecubital fossa. PRP was injected into the right knee, along with Synvisc 1, without any complications. We reviewed the post procedure protocol including nonweightbearing, crutch assisted ambulation for 2 days and then progressing off the crutches as tolerated. The patient was given tramadol for breakthrough  pain; and will follow-up in one month for recheck.    Today, 9/9/2024, he returns for 6-1/2-week follow-up of right knee DJD status post a trial of PRP and HA injections.  He reports 50% improvement.  He is having less pain behind the knee with regular activities.  He was able to golf with decreased pain.  He is still using meloxicam.  He does have indomethacin which she is not using.  His biggest complaint is pain to the lateral joint line.  He denies interval injury or trauma.    He has no other complaints.    Physical Examination:     The RIGHT knee is without joint effusion. Patella crepitus is positive. There is moderate tenderness along the distal IT band at the lateral joint line, but no tenderness to the medial and lateral joint lines.  LFC compression test is positive.  Flexion and extension are without mechanical blocking. There is no instability with stress testing.   The LEFT knee is nontender and stable.  Skin - no rashes, sores, or open lesions. Strength, sensory and vascular exams are otherwise normal. There is no clubbing, cyanosis or edema.  Gait is slightly antalgic and tandem.      Problem List Items Addressed This Visit    None  Visit Diagnoses         Codes    It band syndrome, left    -  Primary M76.32    Relevant Orders    Referral to Physical Therapy    Primary osteoarthritis of right knee     M17.11    Relevant Orders    Referral to Physical Therapy              Assessment and Plan:     We reviewed the exam findings and discussed the conservative and surgical treatment options. We agreed that he has gotten some relief at this point with our treatment and I expect potential for additional improvement over the next 4 to 5 weeks.  He was encouraged to give it some additional time.  His biggest complaint today was distal IT band tendinitis.  He was referred to physical therapy and encouraged to use topical diclofenac daily.  [facility] and let me know how the  knee is doing.    **This note was dictated using Dragon speech recognition software and was not corrected for spelling or grammatical errors**.    Cedrick Hernandez MD  Sports Medicine Specialist  [facility] [place]

## 2024-09-10 DIAGNOSIS — R79.89 LOW TESTOSTERONE IN MALE: ICD-10-CM

## 2024-09-12 DIAGNOSIS — R79.89 LOW TESTOSTERONE IN MALE: ICD-10-CM

## 2024-09-12 RX ORDER — TESTOSTERONE 20.25 MG/1.25G
GEL TOPICAL
Status: CANCELLED | OUTPATIENT
Start: 2024-09-12

## 2024-09-16 RX ORDER — TESTOSTERONE ENANTHATE 75 MG/.5ML
75 INJECTION SUBCUTANEOUS
Qty: 4 EACH | Refills: 5 | Status: SHIPPED | OUTPATIENT
Start: 2024-09-16

## 2024-09-17 DIAGNOSIS — R79.89 LOW TESTOSTERONE IN MALE: ICD-10-CM

## 2024-09-17 RX ORDER — TESTOSTERONE 20.25 MG/1.25G
GEL TOPICAL
Qty: 150 G | Refills: 5 | Status: SHIPPED | OUTPATIENT
Start: 2024-09-17

## 2024-09-18 ENCOUNTER — APPOINTMENT (OUTPATIENT)
Dept: PHYSICAL THERAPY | Facility: CLINIC | Age: 64
End: 2024-09-18
Payer: COMMERCIAL

## 2024-09-23 ENCOUNTER — PROCEDURE VISIT (OUTPATIENT)
Dept: NEUROLOGY | Facility: CLINIC | Age: 64
End: 2024-09-23
Payer: COMMERCIAL

## 2024-09-23 DIAGNOSIS — G43.709 CHRONIC MIGRAINE WITHOUT AURA WITHOUT STATUS MIGRAINOSUS, NOT INTRACTABLE: Primary | ICD-10-CM

## 2024-09-23 PROCEDURE — 64615 CHEMODENERV MUSC MIGRAINE: CPT | Performed by: STUDENT IN AN ORGANIZED HEALTH CARE EDUCATION/TRAINING PROGRAM

## 2024-09-23 PROCEDURE — 2500000004 HC RX 250 GENERAL PHARMACY W/ HCPCS (ALT 636 FOR OP/ED): Mod: JZ | Performed by: STUDENT IN AN ORGANIZED HEALTH CARE EDUCATION/TRAINING PROGRAM

## 2024-09-23 NOTE — PROGRESS NOTES
Neurology Botulinum Injection    Subjective   Abhishek Olson is an 63 y.o. male here for medical botulinum injection for Chronic migraine without aura without status migrainosus, not intractable [G43.709].     Since last Botox, headaches improved in severity >50%. Headaches gradually returned to baseline. Some mild upper back tenderness R side after last Botox. Would like to proceed with Botox today.     Botox    Date/Time: 9/23/2024 11:49 AM    Performed by: Jens Boone DO  Authorized by: Jens Boone DO    Procedure specific details:      Procedure Note: PREEMPT Botox    Indications: Chronic Migraine    Informed consent was obtained (explaining the procedure and risks and benefits of procedure) from patient: the signed consent form was placed in the medical record.  A time out was completed, verifying correct patient, procedure,site, positioning, and implants or special equipment.    200 units of OnabotulinumtoxinA were reconstituted with saline. Sites of injection were identified via PREEMPT protocol and cleaned with alcohol pads. Using a 30 gauge needle, patient received following injections:    5 units in procerus  5 units in each left and right   10 units divided between 2 sites of L frontalis  10 units divided between 2 sites of R frontalis  20 units divided between 4 sites of L temporalis  20 units divided between 4 sites of R temporalis  15 units divided between 3 sites of L occipitalis  15 units divided between 3 sites of R occipitalis  10 units divided between 2 sites of L paracervical muscles  10 units divided between 2 sites of R paracervical muscles  15 units divided between 3 sites of L trapezius  15 units divided between 3 sites of R trapezius    Additional Sites:  none    Used: 155u  Wasted: 45u    There were no complications. Patient was comfortable and left without complaint.     OnabotulinumtoxinA  Lot #: [id number]  Exp: 11/2026

## 2024-09-30 ENCOUNTER — APPOINTMENT (OUTPATIENT)
Dept: NEUROLOGY | Facility: CLINIC | Age: 64
End: 2024-09-30
Payer: COMMERCIAL

## 2024-10-17 ENCOUNTER — HOSPITAL ENCOUNTER (OUTPATIENT)
Dept: RADIOLOGY | Facility: EXTERNAL LOCATION | Age: 64
Discharge: HOME | End: 2024-10-17

## 2024-10-17 ENCOUNTER — OFFICE VISIT (OUTPATIENT)
Dept: ORTHOPEDIC SURGERY | Facility: CLINIC | Age: 64
End: 2024-10-17
Payer: COMMERCIAL

## 2024-10-17 DIAGNOSIS — M17.11 PRIMARY OSTEOARTHRITIS OF RIGHT KNEE: ICD-10-CM

## 2024-10-17 PROCEDURE — 99214 OFFICE O/P EST MOD 30 MIN: CPT | Performed by: FAMILY MEDICINE

## 2024-10-17 PROCEDURE — 2500000004 HC RX 250 GENERAL PHARMACY W/ HCPCS (ALT 636 FOR OP/ED): Performed by: FAMILY MEDICINE

## 2024-10-17 PROCEDURE — 20611 DRAIN/INJ JOINT/BURSA W/US: CPT | Mod: RT | Performed by: FAMILY MEDICINE

## 2024-10-17 RX ORDER — ROPIVACAINE HYDROCHLORIDE 5 MG/ML
4 INJECTION, SOLUTION EPIDURAL; INFILTRATION; PERINEURAL
Status: COMPLETED | OUTPATIENT
Start: 2024-10-17 | End: 2024-10-17

## 2024-10-17 RX ORDER — METHYLPREDNISOLONE ACETATE 40 MG/ML
80 INJECTION, SUSPENSION INTRA-ARTICULAR; INTRALESIONAL; INTRAMUSCULAR; SOFT TISSUE
Status: COMPLETED | OUTPATIENT
Start: 2024-10-17 | End: 2024-10-17

## 2024-10-17 RX ORDER — LIDOCAINE HYDROCHLORIDE 10 MG/ML
4 INJECTION, SOLUTION INFILTRATION; PERINEURAL
Status: COMPLETED | OUTPATIENT
Start: 2024-10-17 | End: 2024-10-17

## 2024-10-17 NOTE — PROGRESS NOTES
Patient ID: Abhishek Olson is a 63 y.o. male.    L Inj/Asp: R knee on 10/17/2024 2:52 PM  Indications: pain  Details: 22 G needle, ultrasound-guided superolateral approach  Medications: 4 mL lidocaine 10 mg/mL (1 %); 4 mL ropivacaine 5 mg/mL (0.5 %); 80 mg methylPREDNISolone acetate 40 mg/mL  Outcome: tolerated well, no immediate complications  Procedure, treatment alternatives, risks and benefits explained, specific risks discussed. Consent was given by the patient. Immediately prior to procedure a time out was called to verify the correct patient, procedure, equipment, support staff and site/side marked as required. Patient was prepped and draped in the usual sterile fashion.       Patient is here for right knee PRP Patient ID: Abhishek Olson is a 63 y.o. male.    Sports Medicine Office Note    Today's Date:  10/17/2024     HPI: Abhishek Olson is a 63 y.o. 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    On 7/8/2024, he complains of with persistent anterior lateral right knee pain.  This is recently improved after receiving a local cortisone injection from a [facility] surgeon on 4/12/2024 and again by Dr. Edmund Nelson on 6/21/2024.  Dr. Nelson recommend that he see me to discuss gel injections.  He has had both hips replaced and 1 with revision due to previous traumas.  He is trying to hold off on knee replacement as long as possible.  He does not really have much discomfort from his left knee today.  We agreed to pursue additional injection options with either PRP with/without HA or HA by itself.  He was given information to review.  He understands PRP is not covered by health insurance and would be an out-of-pocket expense.  He was given the number of my  to call at his leisure to pursue insurance approval for HA.  Activity modifications and maximizing APAP and Voltaren gel reviewed.  I am happy to see  him back for injections.    7/26/2024, he returns for follow-up of chronic right knee DJD pain and to start a trial of PRP with viscosupplementation.  His brother is in attendance with him today.  He denies interval injury or trauma.  He is requesting long-term analgesia.  We agreed to start [place]/PRP treatment today. We were successful obtaining blood specimen out of the right antecubital fossa. PRP was injected into the right knee, along with Synvisc 1, without any complications. We reviewed the post procedure protocol including nonweightbearing, crutch assisted ambulation for 2 days and then progressing off the crutches as tolerated. The patient was given tramadol for breakthrough pain; and will follow-up in one month for recheck.    9/9/2024, he returns for 6-1/2-week follow-up of right knee DJD status post a trial of PRP and HA injections.  He reports 50% improvement.  He is having less pain behind the knee with regular activities.  He was able to golf with decreased pain.  He is still using meloxicam.  He does have indomethacin which she is not using.  His biggest complaint is pain to the lateral joint line.  He denies interval injury or trauma.  We agreed that he has gotten some relief at this point with our treatment and I expect potential for additional improvement over the next 4 to 5 weeks.  He was encouraged to give it some additional time.  His biggest complaint today was distal IT band tendinitis.  He was referred to physical therapy and encouraged to use topical diclofenac daily.  He will follow-up via [facility] in the next month and let me know how the knee is doing.    Today, 10/17/2024, he returns for 6-week follow-up of his chronic right knee pain.  He is having severe subpatellar knee pain and denies recent injury or trauma.  He is also having mild lateral knee pain along the distal IT band.  He has had treatment for this in the past with the [facility] former physical therapist and has home exercises.   It has been over 12 weeks since we performed PRP with hyaluronic acid and his benefits have been maximized and starting to wear off.  He is frustrated and wants better pain relief.    He has no other complaints.    Physical Examination:     The RIGHT knee is without joint effusion. Patella crepitus is positive. There is moderate tenderness along the distal IT band at the lateral joint line, but no tenderness to the medial and lateral joint lines.  LFC compression test is positive.  Flexion and extension are without mechanical blocking. There is no instability with stress testing.   The LEFT knee is nontender and stable.  Skin - no rashes, sores, or open lesions. Strength, sensory and vascular exams are otherwise normal. There is no clubbing, cyanosis or edema.  Gait is slightly antalgic and tandem.      Procedure Note:  After consent was obtained, the RIGHT knee was prepped in a sterile fashion. Ultrasound guidance was used to help insure proper needle placement into the knee joint, decrease patient discomfort, and decrease collateral damage. The joint was visualized and Depo-Medrol 80 mg with lidocaine 4 mL & ropivacaine 4 mL were injected without any complications. Ultrasound images were saved on an internal file for later reference. The patient tolerated the procedure well and the area was cleaned and bandaged.      Problem List Items Addressed This Visit    None  Visit Diagnoses         Codes    Primary osteoarthritis of right knee     M17.11    Relevant Orders    Point of Care Ultrasound (Completed)              Assessment and Plan:     We reviewed the exam findings and discussed the conservative and surgical treatment options. We agreed that he is no longer getting benefit from the HA/PRP injection and his pain is very frustrating for his daily activities.  We agreed upon a cortisone injection.  I do not feel like his back or hip of the cause of his pain today. We will refer him to Dr. Dickey to discuss  surgery.  I do not feel like he would be candidate for partial and would likely need a full knee replacement.  He declined referral to PT for formal treatment for distal IT band and will use his home exercises.  I will see him back as needed.    **This note was dictated using Dragon speech recognition software and was not corrected for spelling or grammatical errors**.    Cedrick Hernandez MD  Sports Medicine Specialist  [facility] [place]

## 2024-10-21 ENCOUNTER — APPOINTMENT (OUTPATIENT)
Dept: NEUROLOGY | Facility: CLINIC | Age: 64
End: 2024-10-21
Payer: COMMERCIAL

## 2024-10-24 ENCOUNTER — OFFICE VISIT (OUTPATIENT)
Dept: ORTHOPEDIC SURGERY | Facility: CLINIC | Age: 64
End: 2024-10-24
Payer: COMMERCIAL

## 2024-10-24 ENCOUNTER — HOSPITAL ENCOUNTER (OUTPATIENT)
Dept: RADIOLOGY | Facility: CLINIC | Age: 64
Discharge: HOME | End: 2024-10-24
Payer: COMMERCIAL

## 2024-10-24 DIAGNOSIS — M17.11 PRIMARY OSTEOARTHRITIS OF RIGHT KNEE: ICD-10-CM

## 2024-10-24 PROCEDURE — 73562 X-RAY EXAM OF KNEE 3: CPT | Mod: RIGHT SIDE | Performed by: RADIOLOGY

## 2024-10-24 PROCEDURE — 99214 OFFICE O/P EST MOD 30 MIN: CPT | Performed by: PHYSICIAN ASSISTANT

## 2024-10-24 PROCEDURE — 73562 X-RAY EXAM OF KNEE 3: CPT | Mod: RT

## 2024-10-24 PROCEDURE — 1036F TOBACCO NON-USER: CPT | Performed by: PHYSICIAN ASSISTANT

## 2024-10-24 ASSESSMENT — PAIN SCALES - GENERAL: PAINLEVEL_OUTOF10: 5 - MODERATE PAIN

## 2024-10-24 ASSESSMENT — PAIN DESCRIPTION - DESCRIPTORS: DESCRIPTORS: ACHING;THROBBING

## 2024-10-24 ASSESSMENT — ENCOUNTER SYMPTOMS
COUGH: 0
CARDIOVASCULAR NEGATIVE: 1
DIZZINESS: 0
CHEST TIGHTNESS: 0
DIFFICULTY URINATING: 0
NERVOUS/ANXIOUS: 0
DYSURIA: 0
PALPITATIONS: 0
ENDOCRINE NEGATIVE: 1
LIGHT-HEADEDNESS: 0
BLOOD IN STOOL: 0
EYES NEGATIVE: 1
AGITATION: 0
CHILLS: 0
WEAKNESS: 0
CONFUSION: 0
WHEEZING: 0
HEMATURIA: 0
WOUND: 0
ACTIVITY CHANGE: 0
FEVER: 0
SHORTNESS OF BREATH: 0
ABDOMINAL PAIN: 0
BACK PAIN: 0
VOMITING: 0
RESPIRATORY NEGATIVE: 1
NAUSEA: 0
JOINT SWELLING: 1
CONSTITUTIONAL NEGATIVE: 1
HEMATOLOGIC/LYMPHATIC NEGATIVE: 1
ARTHRALGIAS: 0
FREQUENCY: 0
ALLERGIC/IMMUNOLOGIC NEGATIVE: 1
FATIGUE: 0
COLOR CHANGE: 0

## 2024-10-24 ASSESSMENT — PAIN - FUNCTIONAL ASSESSMENT: PAIN_FUNCTIONAL_ASSESSMENT: 0-10

## 2024-10-24 NOTE — PROGRESS NOTES
JEFF Villareal, PAPamelaC, ATC  Adult Reconstruction and Joint Replacement Surgery  Phone: [phone]     Fax:[phone]            Chief Complaint   Patient presents with    Right Knee - Pain, Consult       HPI:      Abhishek Olson is a pleasant 63 y.o. year-old male who is seen today for evaluation of right knee pain.  He is a referral from Dr. Hernandez.  He is a  for the [employer].  He complains of right knee pain.  He points to the medial aspect and the anterior patellofemoral aspect of his knee as the main source of his pain.  He rates his pain as a 5 out of 10.  He did recently had a cortisone injection which she states is still helping.  However, he has failed previous cortisone injections, PRP and viscosupplementation in the past.  He is fed up with his quality of life.  He would like to consider a right total knee arthroplasty which is indicated at this time.  He has had bilateral hip replacements.  The left was a revision due to a loose stem from what it sounds.  He also had a compartment syndrome on the right thigh back in the early 80s.  He is currently on meloxicam which she states is helping somewhat.  He denies any instability or mechanical symptoms.    Review of Systems   Constitutional: Negative.  Negative for activity change, chills, fatigue and fever.   HENT: Negative.     Eyes: Negative.    Respiratory: Negative.  Negative for cough, chest tightness, shortness of breath and wheezing.    Cardiovascular: Negative.  Negative for palpitations.   Gastrointestinal:  Negative for abdominal pain, blood in stool, nausea and vomiting.   Endocrine: Negative.  Negative for cold intolerance and polyuria.   Genitourinary: Negative.  Negative for difficulty urinating, dysuria, frequency, hematuria and urgency.   Musculoskeletal:  Positive for gait problem and joint swelling. Negative for arthralgias and back pain.   Skin: Negative.  Negative for color change, pallor, rash and wound.    Allergic/Immunologic: Negative.  Negative for environmental allergies.   Neurological:  Negative for dizziness, weakness and light-headedness.   Hematological: Negative.    Psychiatric/Behavioral:  Negative for agitation, confusion and suicidal ideas. The patient is not nervous/anxious.    All other systems reviewed and are negative.      There were no vitals filed for this visit.    Past Medical History:   Diagnosis Date    Essential (primary) hypertension 05/11/2022    Hypertension    Mixed hyperlipidemia     Elevated cholesterol with high triglycerides    Other specified health status 03/31/2015    No pertinent past medical history    Other specified postprocedural states 06/07/2016    History of colonoscopy    Personal history of other diseases of urinary system     History of kidney problems     Patient Active Problem List   Diagnosis    Hypertension, essential    Low testosterone in male    Concussion with no loss of consciousness    Anxiety       Medication Documentation Review Audit       Reviewed by Jessenia Khalil LPN (Licensed Nurse) on 10/24/24 at 0950      Medication Order Taking? Sig Documenting Provider Last Dose Status   aluminum hydrox-magnesium carb (Gaviscon Extra Strength) 160-105 mg tablet,chewable [id number] Yes Chew. Historical Provider, MD  Active   aspirin 81 mg EC tablet [id number] Yes Take by mouth. Historical Provider, MD  Active   clindamycin (Cleocin) 300 mg capsule [id number] Yes TAKE 2 CAPSULES BY MOUTH ONE HOUR PRIOR TO DENTAL VISIT Historical Provider, MD  Active   divalproex (Depakote) 250 mg EC tablet [id number] Yes Take 1 tablet (250 mg) by mouth twice a day. Historical Provider, MD  Active   docusate sodium (Colace) 100 mg capsule [id number] Yes Take by mouth every 12 hours. Historical Provider, MD  Active   escitalopram (Lexapro) 20 mg tablet [id number] Yes Take 1 tablet (20 mg) by mouth once daily. Cedrick Hernandez MD  Active   esomeprazole (NexIUM) 40 mg DR capsule [id number] Yes Take 1  capsule (40 mg) by mouth 2 times a day. Historical Provider, MD  Active   fluticasone (Flonase) 50 mcg/actuation nasal spray [id number] Yes Administer into affected nostril(s). Historical Provider, MD  Active   hydrOXYzine pamoate (Vistaril) 25 mg capsule [id number] Yes Take by mouth every 12 hours. Historical Provider, MD  Active   indomethacin SR (Indocin SR) 75 mg ER capsule [id number] Yes Take 1 capsule (75 mg) by mouth 2 times a day as needed. Historical Provider, MD  Active   lisinopril 40 mg tablet [id number] Yes Take by mouth. Historical Provider, MD  Active   lisinopriL-hydrochlorothiazide 20-25 mg tablet [id number]  Take 1 tablet by mouth once daily. Cedrick Hernandez MD   10/15/24 4322   lisinopriL-hydrochlorothiazide 20-25 mg tablet [id number] Yes Take 1 tablet by mouth once daily. Historical Provider, MD  Active   meloxicam (Mobic) 15 mg tablet [id number] Yes Take 1 tablet (15 mg) by mouth once daily. Take with food. Historical Provider, MD  Active   methylPREDNISolone (Medrol) 4 mg tablet [id number] Yes Take by mouth. Historical Provider, MD  Active   mupirocin (Bactroban) 2 % ointment [id number] Yes every 12 hours. Historical Provider, MD  Active   naproxen (Naprosyn) 375 mg tablet [id number] Yes Take 1 tablet (375 mg) by mouth every 12 hours. Historical Provider, MD  Active   omeprazole (PriLOSEC) 20 mg DR capsule [id number] Yes Take by mouth once daily. Historical Provider, MD  Active   ondansetron (Zofran) 4 mg tablet [id number] Yes Take by mouth. Historical Provider, MD  Active   predniSONE (Deltasone) 20 mg tablet [id number] Yes Take by mouth once daily. Historical Provider, MD  Active   rosuvastatin (Crestor) 20 mg tablet [id number] Yes Take 1 tablet (20 mg) by mouth once daily. Historical Provider, MD  Active   sildenafil (Viagra) 50 mg tablet [id number] Yes Take by mouth. Historical Provider, MD  Active   sodium,potassium,mag sulfates (Suprep Bowel Prep Kit) 17.5-3.13-1.6 gram recon soln solution [id number]  Yes Take by mouth. Tiffany Camacho MD  Active   tadalafil (Cialis) 20 mg tablet [id number]  Take 1 tablet (20 mg) by mouth 1 time if needed for erectile dysfunction (take 1 hour prior to activity). Cedrick Hernandez MD   23 [id number]   terbinafine (LamISIL) 250 mg tablet [id number] Yes Take by mouth once daily. Tiffany Camacho MD  Active   testosterone 20.25 mg/1.25 gram (1.62 %) gel in metered-dose pump [id number] Yes apply 4 pumps topically to the upper arm once daily Patrick Toro MD  Active   testosterone 20.25 mg/1.25 gram (1.62 %) gel in metered-dose pump [id number] Yes APPLY 4 PUMPS TO UPPER ARM DAILY FOR LOW TESTOSTERONE Patrick Toro MD  Active   testosterone enanthate (Xyosted) 75 mg/0.5 mL auto-injector [id number] Yes Inject 1 Syringe (75 mg) under the skin every 7 days. Patrick Toro MD  Active   topiramate (Topamax) 25 mg tablet [id number] Yes TAKE 1 TABLET BY MOUTH AT NIGHT FOR 1 WEEK  THEN INCREASE TO 2 TABLETS AT NIGHT. Tiffany Camacho MD  Active   venlafaxine XR (Effexor-XR) 75 mg 24 hr capsule [id number] Yes Take 1 capsule (75 mg) by mouth once daily. Tiffany Camacho MD  Active   ZOLMitriptan (Zomig-ZMT) 5 mg disintegrating tablet [id number] Yes Take by mouth. Tiffany Camacho MD  Active                    Allergies   Allergen Reactions    Penicillins Rash       Social History     Socioeconomic History    Marital status:      Spouse name: Not on file    Number of children: Not on file    Years of education: Not on file    Highest education level: Not on file   Occupational History    Not on file   Tobacco Use    Smoking status: Never    Smokeless tobacco: Never   Substance and Sexual Activity    Alcohol use: Not on file    Drug use: Not on file    Sexual activity: Not on file   Other Topics Concern    Not on file   Social History Narrative    Not on file     Social Drivers of Health     Financial Resource Strain: Not on file   Food Insecurity: Not on file   Transportation  "Needs: Not on file   Physical Activity: Not on file   Stress: Not on file   Social Connections: Not on file   Intimate Partner Violence: Not on file   Housing Stability: Not on file       Past Surgical History:   Procedure Laterality Date    OTHER SURGICAL HISTORY  05/03/2019    Hernia repair    OTHER SURGICAL HISTORY  05/03/2019    Orbital blowout fracture repair    OTHER SURGICAL HISTORY  05/03/2019    Hip surgery    SHOULDER SURGERY  03/31/2015    Shoulder Surgery       There is no height or weight on file to calculate BMI.    A1C  No results found for: \"HGBA1C\", \"[id number]\"    Physical Exam:  side: right Knee:  General: Well-appearing male in no acute distress.  Awake, alert and oriented.  Pleasant and cooperative.  Respiratory: Non-labored breathing  Mood: Euthymic   Gait: Antalgic  Assistive Device: no device  Skin: warm, dry and intact without lesions    Tenderness to palpation over anterior, medial, and diffuse, Joint line.  Effusion: mild  ROM Extension: 0 Flexion: 115  Valgus Stress 0 degrees-Negative  Valgus Stress 30 degrees-Negative  Varus Stress 0 degrees-Negative  Valgus Stress 30 degrees-Negative  Instability in the AP plane at 90 degrees No  Lachmans 1+  Anterior Drawer-Negative  Mcmurrays negative  Pain with deep flexion-Negative  Grind-Negative   Patella Crepitus-Negative, Facets nontender  J-Sign- Negative  Patella Apprehension-Negative  Patella Tilt-Negative  Extensor Mechanism-intact, Patellar tendon non-tender  5/5 knee extension  5/5 knee flexion, DF/PF/EHL  SILT in a deandre/saph/per/tib distribution  Neuro L5-S1 sensation intact bilaterally, No clonus. 2+ symmetric patella and achilles reflexes bilateral.  2+ Femoral/DP/PT pulses bilaterally  Compartments supple and non-tender.  Extremities warm and well perfused.    Imaging:    Imaging-4 view xrays of the side: right knee were reviewed and interpreted in clinic today. The findings were reviewed with the patient. There are Advanced joint space " narrowing with underlying osteophytic, sclerotic change and cystic changes. No fractures or dislocation. Mild soft tissue swelling and effusion noted.    Impression/Plan:  Abhishek Olson and I discussed the etiology of symptoms.  We discussed in detail a treatment plan that he/she is comfortable with.      Advanced Osteoarthritis side: right Knee. We reviewed an evidence-based approach to osteoarthritis of the knee.    I discussed non-operative treatments for the patients' arthritis in detail and briefly discussed indications for surgery. The patient should try and delay joint replacement surgery for as long as possible. If the patients' joint problem affects their quality of life and cause significant restrictions of their activities, they may want to consider joint replacement surgery. I reviewed the importance of adopting a low impact lifestyle and avoidance of joint overloading activities. We discussed the role of weight related issues and anti-inflammatories, including their risks, and need for regular monitoring for side effects of these medications.     The Patient has elected to proceed with right total knee arthroplasty at this time.  He understands that we are 5 months out from surgery but was still like to proceed with our practice.  We will get him on a cancellation list and hopefully move him up of all possible.  He cannot have an injection until after January 17 due to his most recent injection on October 17.    All questions were answered.    Follow-up PRN    JEFF Villareal, PA-C, ATC  Orthopedic Physician Assisant  Adult Reconstruction and Total Joint Replacement  General Orthopedics  Department of Orthopaedic Surgery  [place] [address]  [email or web address] messaging preferred

## 2024-11-14 DIAGNOSIS — E78.2 MIXED HYPERLIPIDEMIA: ICD-10-CM

## 2024-11-14 DIAGNOSIS — I10 HYPERTENSION, ESSENTIAL: ICD-10-CM

## 2024-11-14 DIAGNOSIS — I10 HYPERTENSION, ESSENTIAL: Primary | ICD-10-CM

## 2024-11-14 RX ORDER — ROSUVASTATIN CALCIUM 20 MG/1
20 TABLET, COATED ORAL DAILY
Qty: 90 TABLET | Refills: 3 | Status: SHIPPED | OUTPATIENT
Start: 2024-11-14

## 2024-11-14 RX ORDER — LISINOPRIL AND HYDROCHLOROTHIAZIDE 20; 25 MG/1; MG/1
1 TABLET ORAL DAILY
Qty: 90 TABLET | Refills: 4 | Status: SHIPPED | OUTPATIENT
Start: 2024-11-14

## 2024-11-14 RX ORDER — LISINOPRIL AND HYDROCHLOROTHIAZIDE 20; 25 MG/1; MG/1
1 TABLET ORAL DAILY
Qty: 90 TABLET | Refills: 3 | Status: SHIPPED | OUTPATIENT
Start: 2024-11-14

## 2024-11-25 PROBLEM — M17.11 UNILATERAL PRIMARY OSTEOARTHRITIS, RIGHT KNEE: Status: ACTIVE | Noted: 2024-11-24

## 2024-12-12 ENCOUNTER — OFFICE VISIT (OUTPATIENT)
Dept: ORTHOPEDIC SURGERY | Facility: CLINIC | Age: 64
End: 2024-12-12
Payer: COMMERCIAL

## 2024-12-12 VITALS — WEIGHT: 264 LBS | HEIGHT: 74 IN | BODY MASS INDEX: 33.88 KG/M2

## 2024-12-12 DIAGNOSIS — M17.11 PRIMARY OSTEOARTHRITIS OF RIGHT KNEE: Primary | ICD-10-CM

## 2024-12-12 PROCEDURE — 3008F BODY MASS INDEX DOCD: CPT | Performed by: ORTHOPAEDIC SURGERY

## 2024-12-12 PROCEDURE — 99214 OFFICE O/P EST MOD 30 MIN: CPT | Performed by: ORTHOPAEDIC SURGERY

## 2024-12-12 NOTE — PROGRESS NOTES
Patient is a 63-year-old gentleman who presents today for discussion of right total knee arthroplasty having decided to proceed with surgery.  He rates his pain as an Ortho 5-6 out of 10.  He is failed a greater than 3-month trial of reasonable conservative treatment including cortisone injections, viscosupplementation and PRP.  He takes an anti-inflammatory daily.,  This is working.  He is extremely fed up with his quality of life and would like to proceed with total knee arthroplasty which is indicated at this time.    Right knee:  AAOx3, NAD, walks with a moderate antalgic gait  Varus allignment  Range of motion lacks 10 degrees of full extension and flexes to 110 degrees  Stable to varus/valgus/anterior/posterior stress through out the range of motion  Slight laxity with varus stress  Diffuse medial  joint line tenderness to palpation  Moderate effusion  SILT in a deandre/saph/per/tib distribution  5/5 knee extension/df/pf/ehl  ½ dorsalis pedis and posterior tibial pulse  no popliteal lymphadenopathy  no other overlying lesions  mood: euthymic  Respirations non labored    Plain films were reviewed by myself in clinic today.  They have advanced osteoarthritis of their knee with significant joint space narrowing, bone on bone changes, underlying sclerosis and tricompartmental osteophytic change.    Patient is now failed a greater than 3-month trial of reasonable conservative treatment and wishes proceed with surgery which is indicated at this time.  Discussed the risk benefits alternatives to surgery.  All of their questions were answered.    Procedure: right total knee  Location: [facility] [place]  ICD10: M16.11  CPT: 44929  Stay: outpatient  [facility]    I talked with the patient at length about risks, limitations, benefits and alternatives to total knee replacement today. I reviewed concerns about implant wear, loosening, breakage, infection and infection prophylaxis, DVT, PE, death and other medical and  anesthetic complications of surgery. We talked about the potential for persistent pain following surgery since there are many possible causes for knee and leg pain. The patient was advised that knee replacement will only relieve pain that is coming from the knee. We talked about limited range of motion following knee replacement and the importance of physical therapy and their motivation. We talked about improvements in pain management post-operatively and our accelerated rehab program. We talked about wound healing issues and neurovascular complications of surgery. I reviewed functional and activity restrictions in detail. We discussed the fact that many of our patients are able to go home in 1 day or less depending on their health, mobility, pre-op preparation, individual home situation and personal preference.  The patient has identified their personal goals of their joint replacement surgery and recovery and we have discussed them. These are documented in our [facility] patient engagement platform. In addition, we have discussed the advantages and disadvantages of various implant and fixation options, as well as various surgical approaches.  The basic concepts of the joint replacement procedure has been reviewed with the patient and the patient has been provided the opportunity to see an actual implant either in the office or in our pre-op education class.  All of the patients questions were answered. The patient can call my office to schedule surgery and the pre-op teaching class. I told the patient that they should contact their primary care physician to discuss fitness for surgery.    This note was created using voice recognition software and was not corrected for typographical or grammatical errors.

## 2024-12-30 ENCOUNTER — APPOINTMENT (OUTPATIENT)
Dept: PREADMISSION TESTING | Facility: HOSPITAL | Age: 64
End: 2024-12-30
Payer: COMMERCIAL

## 2025-01-03 ENCOUNTER — HOSPITAL ENCOUNTER (OUTPATIENT)
Dept: RADIOLOGY | Facility: HOSPITAL | Age: 65
Discharge: HOME | End: 2025-01-03
Payer: COMMERCIAL

## 2025-01-03 ENCOUNTER — PRE-ADMISSION TESTING (OUTPATIENT)
Dept: PREADMISSION TESTING | Facility: HOSPITAL | Age: 65
End: 2025-01-03
Payer: COMMERCIAL

## 2025-01-03 ENCOUNTER — EDUCATION (OUTPATIENT)
Dept: ORTHOPEDIC SURGERY | Facility: HOSPITAL | Age: 65
End: 2025-01-03
Payer: COMMERCIAL

## 2025-01-03 VITALS
TEMPERATURE: 97.7 F | DIASTOLIC BLOOD PRESSURE: 99 MMHG | SYSTOLIC BLOOD PRESSURE: 132 MMHG | HEART RATE: 80 BPM | WEIGHT: 271.39 LBS | RESPIRATION RATE: 16 BRPM | OXYGEN SATURATION: 95 % | HEIGHT: 74 IN | BODY MASS INDEX: 34.83 KG/M2

## 2025-01-03 DIAGNOSIS — M17.11 UNILATERAL PRIMARY OSTEOARTHRITIS, RIGHT KNEE: ICD-10-CM

## 2025-01-03 DIAGNOSIS — Z01.818 PREOP TESTING: Primary | ICD-10-CM

## 2025-01-03 LAB
ALBUMIN SERPL BCP-MCNC: 4.7 G/DL (ref 3.4–5)
ALP SERPL-CCNC: 89 U/L (ref 33–136)
ALT SERPL W P-5'-P-CCNC: 52 U/L (ref 10–52)
ANION GAP SERPL CALC-SCNC: 13 MMOL/L (ref 10–20)
AST SERPL W P-5'-P-CCNC: 33 U/L (ref 9–39)
ATRIAL RATE: 72 BPM
BILIRUB SERPL-MCNC: 1.5 MG/DL (ref 0–1.2)
BUN SERPL-MCNC: 22 MG/DL (ref 6–23)
CALCIUM SERPL-MCNC: 10 MG/DL (ref 8.6–10.3)
CHLORIDE SERPL-SCNC: 98 MMOL/L (ref 98–107)
CO2 SERPL-SCNC: 28 MMOL/L (ref 21–32)
CREAT SERPL-MCNC: 0.96 MG/DL (ref 0.5–1.3)
EGFRCR SERPLBLD CKD-EPI 2021: 89 ML/MIN/1.73M*2
ERYTHROCYTE [DISTWIDTH] IN BLOOD BY AUTOMATED COUNT: 12.8 % (ref 11.5–14.5)
EST. AVERAGE GLUCOSE BLD GHB EST-MCNC: 91 MG/DL
GLUCOSE SERPL-MCNC: 98 MG/DL (ref 74–99)
HBA1C MFR BLD: 4.8 %
HCT VFR BLD AUTO: 45.5 % (ref 41–52)
HGB BLD-MCNC: 15.6 G/DL (ref 13.5–17.5)
MCH RBC QN AUTO: 31.6 PG (ref 26–34)
MCHC RBC AUTO-ENTMCNC: 34.3 G/DL (ref 32–36)
MCV RBC AUTO: 92 FL (ref 80–100)
NRBC BLD-RTO: NORMAL /100{WBCS}
P AXIS: 52 DEGREES
P OFFSET: 185 MS
P ONSET: 132 MS
PLATELET # BLD AUTO: 294 X10*3/UL (ref 150–450)
POTASSIUM SERPL-SCNC: 4.5 MMOL/L (ref 3.5–5.3)
PR INTERVAL: 172 MS
PROT SERPL-MCNC: 7.9 G/DL (ref 6.4–8.2)
Q ONSET: 218 MS
QRS COUNT: 12 BEATS
QRS DURATION: 88 MS
QT INTERVAL: 410 MS
QTC CALCULATION(BAZETT): 448 MS
QTC FREDERICIA: 435 MS
R AXIS: 10 DEGREES
RBC # BLD AUTO: 4.93 X10*6/UL (ref 4.5–5.9)
SODIUM SERPL-SCNC: 134 MMOL/L (ref 136–145)
T AXIS: 41 DEGREES
T OFFSET: 423 MS
VENTRICULAR RATE: 72 BPM
WBC # BLD AUTO: 6 X10*3/UL (ref 4.4–11.3)

## 2025-01-03 PROCEDURE — 77073 BONE LENGTH STUDIES: CPT

## 2025-01-03 PROCEDURE — 99204 OFFICE O/P NEW MOD 45 MIN: CPT | Performed by: PHYSICIAN ASSISTANT

## 2025-01-03 PROCEDURE — 83036 HEMOGLOBIN GLYCOSYLATED A1C: CPT | Mod: BEALAB

## 2025-01-03 PROCEDURE — 85027 COMPLETE CBC AUTOMATED: CPT

## 2025-01-03 PROCEDURE — 93005 ELECTROCARDIOGRAM TRACING: CPT

## 2025-01-03 PROCEDURE — 80053 COMPREHEN METABOLIC PANEL: CPT

## 2025-01-03 PROCEDURE — 93010 ELECTROCARDIOGRAM REPORT: CPT | Performed by: INTERNAL MEDICINE

## 2025-01-03 PROCEDURE — 36415 COLL VENOUS BLD VENIPUNCTURE: CPT

## 2025-01-03 PROCEDURE — 87081 CULTURE SCREEN ONLY: CPT | Mod: BEALAB

## 2025-01-03 PROCEDURE — 73562 X-RAY EXAM OF KNEE 3: CPT | Mod: RT

## 2025-01-03 RX ORDER — GABAPENTIN 100 MG/1
1 CAPSULE ORAL
COMMUNITY
Start: 2024-12-26

## 2025-01-03 RX ORDER — CHLORHEXIDINE GLUCONATE 40 MG/ML
SOLUTION TOPICAL DAILY
Qty: 470 ML | Refills: 0 | Status: SHIPPED | OUTPATIENT
Start: 2025-01-03 | End: 2025-01-08

## 2025-01-03 RX ORDER — IBUPROFEN 200 MG
400 TABLET ORAL EVERY 8 HOURS PRN
COMMUNITY

## 2025-01-03 RX ORDER — CHLORHEXIDINE GLUCONATE ORAL RINSE 1.2 MG/ML
SOLUTION DENTAL
Qty: 473 ML | Refills: 0 | Status: SHIPPED | OUTPATIENT
Start: 2025-01-03

## 2025-01-03 ASSESSMENT — DUKE ACTIVITY SCORE INDEX (DASI)
CAN YOU TAKE CARE OF YOURSELF (EAT, DRESS, BATHE, OR USE TOILET): YES
CAN YOU RUN A SHORT DISTANCE: NO
DASI METS SCORE: 6.3
CAN YOU CLIMB A FLIGHT OF STAIRS OR WALK UP A HILL: YES
TOTAL_SCORE: 28.7
CAN YOU WALK INDOORS, SUCH AS AROUND YOUR HOUSE: YES
CAN YOU HAVE SEXUAL RELATIONS: YES
CAN YOU WALK A BLOCK OR TWO ON LEVEL GROUND: YES
CAN YOU DO MODERATE WORK AROUND THE HOUSE LIKE VACUUMING, SWEEPING FLOORS OR CARRYING GROCERIES: YES
CAN YOU DO HEAVY WORK AROUND THE HOUSE LIKE SCRUBBING FLOORS OR LIFTING AND MOVING HEAVY FURNITURE: NO
CAN YOU PARTICIPATE IN STRENOUS SPORTS LIKE SWIMMING, SINGLES TENNIS, FOOTBALL, BASKETBALL, OR SKIING: NO
CAN YOU DO YARD WORK LIKE RAKING LEAVES, WEEDING OR PUSHING A MOWER: YES
CAN YOU DO LIGHT WORK AROUND THE HOUSE LIKE DUSTING OR WASHING DISHES: YES
CAN YOU PARTICIPATE IN MODERATE RECREATIONAL ACTIVITIES LIKE GOLF, BOWLING, DANCING, DOUBLES TENNIS OR THROWING A BASEBALL OR FOOTBALL: NO

## 2025-01-03 ASSESSMENT — PAIN - FUNCTIONAL ASSESSMENT: PAIN_FUNCTIONAL_ASSESSMENT: 0-10

## 2025-01-03 ASSESSMENT — ENCOUNTER SYMPTOMS
GASTROINTESTINAL NEGATIVE: 1
RESPIRATORY NEGATIVE: 1
CONSTITUTIONAL NEGATIVE: 1
NECK NEGATIVE: 1
EYES NEGATIVE: 1
ENDOCRINE NEGATIVE: 1
NEUROLOGICAL NEGATIVE: 1
CARDIOVASCULAR NEGATIVE: 1

## 2025-01-03 ASSESSMENT — LIFESTYLE VARIABLES: SMOKING_STATUS: NONSMOKER

## 2025-01-03 ASSESSMENT — PAIN DESCRIPTION - DESCRIPTORS: DESCRIPTORS: DULL;RADIATING

## 2025-01-03 ASSESSMENT — PAIN SCALES - GENERAL: PAINLEVEL_OUTOF10: 4

## 2025-01-03 NOTE — PREPROCEDURE INSTRUCTIONS
Medication List            Accurate as of January 3, 2025 12:10 PM. Always use your most recent med list.                aspirin 81 mg EC tablet  Additional Medication Adjustments for Surgery: Take last dose 7 days before surgery  Notes to patient: Last dose 01/12/2025     * chlorhexidine 0.12 % solution  Commonly known as: Peridex  Swish and spit 15 mL night before surgery and morning of surgery     * chlorhexidine 4 % external liquid  Commonly known as: Hibiclens  Apply topically once daily for 5 days.     clindamycin 300 mg capsule  Commonly known as: Cleocin  Medication Adjustments for Surgery: Take/Use as prescribed     divalproex 250 mg EC tablet  Commonly known as: Depakote  Medication Adjustments for Surgery: Take/Use as prescribed     escitalopram 20 mg tablet  Commonly known as: Lexapro  Take 1 tablet (20 mg) by mouth once daily.  Medication Adjustments for Surgery: Take/Use as prescribed     esomeprazole 40 mg DR capsule  Commonly known as: NexIUM  Medication Adjustments for Surgery: Take/Use as prescribed     gabapentin 100 mg capsule  Commonly known as: Neurontin  Medication Adjustments for Surgery: Take/Use as prescribed     hydrOXYzine pamoate 25 mg capsule  Commonly known as: Vistaril  Medication Adjustments for Surgery: Do Not take on the morning of surgery     ibuprofen 200 mg tablet  Additional Medication Adjustments for Surgery: Take last dose 7 days before surgery  Notes to patient: Last dose 01/12/2025     indomethacin SR 75 mg ER capsule  Commonly known as: Indocin SR  Additional Medication Adjustments for Surgery: Take last dose 7 days before surgery  Notes to patient: Last dose 01/12/2025     * lisinopriL-hydrochlorothiazide 20-25 mg tablet  Take 1 tablet by mouth once daily.  Medication Adjustments for Surgery: Take last dose 1 day (24 hours) before surgery  Notes to patient: Last dose 01/15/2025 (MORNING)     * lisinopriL-hydrochlorothiazide 20-25 mg tablet  TAKE ONE TABLET BY MOUTH  DAILY  Medication Adjustments for Surgery: Take last dose 1 day (24 hours) before surgery  Notes to patient: Last dose 01/15/2025 (MORNING)     * lisinopriL-hydrochlorothiazide 20-25 mg tablet  Take 1 tablet by mouth once daily.  Medication Adjustments for Surgery: Take last dose 1 day (24 hours) before surgery  Notes to patient: Last dose 01/15/2025 (MORNING)     meloxicam 15 mg tablet  Commonly known as: Mobic  Additional Medication Adjustments for Surgery: Take last dose 7 days before surgery  Notes to patient: Last dose 01/12/2025     omeprazole 20 mg DR capsule  Commonly known as: PriLOSEC  Medication Adjustments for Surgery: Take/Use as prescribed     rosuvastatin 20 mg tablet  Commonly known as: Crestor  TAKE ONE TABLET BY MOUTH DAILY  Medication Adjustments for Surgery: Take/Use as prescribed     sildenafil 50 mg tablet  Commonly known as: Viagra  Medication Adjustments for Surgery: Take last dose 3 days before surgery  Notes to patient: Last dose 01/16/2025     tadalafil 20 mg tablet  Commonly known as: Cialis  Take 1 tablet (20 mg) by mouth 1 time if needed for erectile dysfunction (take 1 hour prior to activity).  Medication Adjustments for Surgery: Take last dose 3 days before surgery  Notes to patient: Last dose 01/16/2025     * testosterone 20.25 mg/1.25 gram (1.62 %) gel in metered-dose pump  apply 4 pumps topically to the upper arm once daily  Medication Adjustments for Surgery: Do Not take on the morning of surgery     * testosterone 20.25 mg/1.25 gram (1.62 %) gel in metered-dose pump  APPLY 4 PUMPS TO UPPER ARM DAILY FOR LOW TESTOSTERONE  Medication Adjustments for Surgery: Do Not take on the morning of surgery     topiramate 25 mg tablet  Commonly known as: Topamax  Medication Adjustments for Surgery: Take/Use as prescribed     venlafaxine XR 75 mg 24 hr capsule  Commonly known as: Effexor-XR  Medication Adjustments for Surgery: Take/Use as prescribed           * This list has 7 medication(s)  that are the same as other medications prescribed for you. Read the directions carefully, and ask your doctor or other care provider to review them with you.                          Thank you for visiting [facility].  If you have any changes to your health condition, please call the surgeons office to alert them and give them details of your symptoms.    Shivani Khan PA-C  P: [phone]  Department of Anesthesiology and Perioperative Medicine  --    Preoperative Fasting Guidelines    Why must I stop eating and drinking near surgery time?  With sedation, food or liquid in your stomach can enter your lungs causing serious complications  Increases nausea and vomiting    When do I need to stop eating and drinking before my surgery?  Do not eat any food after midnight the night before your surgery/procedure.  You may have up to 13.5 ounces of clear liquid until TWO hours before your instructed arrival time to the hospital.  This includes water, black tea/coffee, (no milk or cream) apple juice, and electrolyte drinks (Gatorade)  You may chew gum until TWO hours before your surgery/procedure              Preoperative Brain Exercises    What are brain exercises?  A brain exercise is any activity that engages your thinking (cognitive) skills.    What types of activities are considered brain exercises?  Jigsaw puzzles, crossword puzzles, word jumble, memory games, word search, and many more.  Many can be found free online or on your phone via a mobile dwayne.    Why should I do brain exercises before my surgery?  More recent research has shown brain exercise before surgery can lower the risk of postoperative delirium (confusion) which can be especially important for older adults.  Patients who did brain exercises for 5 to 10 hours the days before surgery, cut their risk of postoperative delirium in half up to 1 week after surgery.                  [facility]    Preoperative  Deep Breathing Exercises    Why it is important to do deep breathing exercises before my surgery?  Deep breathing exercises strengthen your breathing muscles.  This helps you to recover after your surgery and decreases the chance of breathing complications.      How are the deep breathing exercises done?  Sit straight with your back supported.  Breathe in deeply and slowly through your nose. Your lower rib cage should expand and your abdomen may move forward.  Hold that breath for 3 to 5 seconds.  Breathe out through pursed lips, slowly and completely.  Rest and repeat 10 times every hour while awake.  Rest longer if you become dizzy or lightheaded.      Patient Information: Incentive Spirometer  What is an incentive spirometer?  An incentive spirometer is a device used before and after surgery to “exercise” your lungs.  It helps you to take deeper breaths to expand your lungs.  Below is an example of a basic incentive spirometer.  The device you receive may differ slightly but they all function the same.    Why do I need to use an incentive spirometer?  Using your incentive spirometer prepares your lungs for surgery and helps prevent lung problems after surgery.  How do I use my incentive spirometer?  When you're using your incentive spirometer, make sure to breathe through your mouth. If you breathe through your nose, the incentive spirometer won't work properly. You can hold your nose if you have trouble.  If you feel dizzy at any time, stop and rest. Try again at a later time.  Follow the steps below:  Set up your incentive spirometer, expand the flexible tubing and connect to the outlet.  Sit upright in a chair or bed. Hold the incentive spirometer at eye level.   Put the mouthpiece in your mouth and close your lips tightly around it. Slowly breathe out (exhale) completely.  Breathe in (inhale) slowly through your mouth as deeply as you can. As you take a breath, you will see the piston rise inside the large  column. While the piston rises, the indicator should move upwards. It should stay in between the 2 arrows (see Figure).  Try to get the piston as high as you can, while keeping the indicator between the arrows.   If the indicator doesn't stay between the arrows, you're breathing either too fast or too slow.  When you get it as high as you can, hold your breath for 10 seconds, or as long as possible. While you're holding your breath, the piston will slowly fall to the base of the spirometer.  Once the piston reaches the bottom of the spirometer, breathe out slowly through your mouth. Rest for a few seconds.  Repeat 10 times. Try to get the piston to the same level with each breath.  Repeat every hour while awake  You can carefully clean the outside of the mouthpiece with an alcohol wipe or soap and water.            Patient and Family Education             Ways You Can Help Prevent Blood Clots             This handout explains some simple things you can do to help prevent blood clots.      Blood clots are blockages that can form in the body's veins. When a blood clot forms in your deep veins, it may be called a deep vein thrombosis, or DVT for short. Blood clots can happen in any part of the body where blood flows, but they are most common in the arms and legs. If a piece of a blood clot breaks free and travels to the lungs, it is called a pulmonary embolus (PE). A PE can be a very serious problem.         Being in the hospital or having surgery can raise your chances of getting a blood clot because you may not be well enough to move around as much as you normally do.         Ways you can help prevent blood clots in the hospital         Wearing SCDs. SCDs stands for Sequential Compression Devices.   SCDs are special sleeves that wrap around your legs  They attach to a pump that fills them with air to gently squeeze your legs every few minutes.   This helps return the blood in your legs to your heart.   SCDs should  only be taken off when walking or bathing.   SCDs may not be comfortable, but they can help save your life.               Wearing compression stockings - if your doctor orders them. These special snug fitting stockings gently squeeze your legs to help blood flow.       Walking. Walking helps move the blood in your legs.   If your doctor says it is ok, try walking the halls at least   5 times a day. Ask us to help you get up, so you don't fall.      Taking any blood thinning medicines your doctor orders.             [facility]; 3/23       Ways you can help prevent blood clots at home       Wearing compression stockings - if your doctor orders them. ? Walking - to help move the blood in your legs.       Taking any blood thinning medicines your doctor orders.      Signs of a blood clot or PE      Tell your doctor or nurse know right away if you have of the problems listed below.    If you are at home, seek medical care right away. Call 911 for chest pain or problems breathing.               Signs of a blood clot (DVT) - such as pain,  swelling, redness or warmth in your arm or leg      Signs of a pulmonary embolism (PE) - such as chest     pain or feeling short of breath           The Week before Surgery        Seven days before Surgery  Check your [facility] medication instructions  Do the exercises provided to you by CPM   Arrange for a responsible, adult licensed  to take you home after surgery and stay with you for 24 hours.  You will not be permitted to drive yourself home if you have received any anesthetic/sedation  Six days before surgery  Check your [facility] medication instructions  Do the exercises provided to you by [facility]   Start using Chlorhexidene (CHG) body wash if prescribed  Five days before surgery  Check your [facility] medication instructions  Do the exercises provided to you by [facility]   Continue to use CHG body wash if prescribed  Three days before surgery  Check your CPM medication instructions  Do the  exercises provided to you by C[facility]   Continue to use CHG body wash if prescribed  Two days before surgery  Check your CPM medication instructions  Do the exercises provided to you by C[facility]   Continue to use CHG body wash if prescribed    The Day before Surgery       Check your CPM medication and all other CPM instructions including when to stop eating and drinking  You will be called with your arrival time for surgery in the late afternoon.  If you do not receive a call please reach out to your surgeon's office.  Do not smoke or drink 24 hours before surgery  Prepare items to bring with you to the hospital  Shower with your chlorhexidine wash if prescribed  Brush your teeth and use your chlorhexidine dental rinse if prescribed    The Day of Surgery       Check your C[facility] medication instructions  Ensure you follow the instructions for when to stop eating and drinking  Shower, if prescribed use CHG.  Do not apply any lotions, creams, moisturizers, perfume or deodorant  Brush your teeth and use your CHG dental rinse if prescribed  Wear loose comfortable clothing  Avoid make-up  Remove  jewelry and piercings, consider professional piercing removal with a plastic spacer if needed  Bring photo ID and Insurance card  Bring an accurate medication list that includes medication dose, frequency and allergies  Bring a copy of your advanced directives (will, health care power of )  Bring any devices and controllers as well as medical devices you have been provided with for surgery (CPAP, slings, braces, etc.)  Dentures, eyeglasses, and contacts will be removed before surgery, please bring cases for contacts or glasses        Patient Information: Pre-Operative Infection Prevention Measures     Why did I have my nose, under my arms, and groin swabbed?  The purpose of the swab is to identify Staphylococcus aureus inside your nose or on your skin.  The swab was sent to the laboratory for culture.  A positive swab/culture for  Staphylococcus aureus is called colonization or carriage.      What is Staphylococcus aureus?  Staphylococcus aureus, also known as “staph”, is a germ found on the skin or in the nose of healthy people.  Sometimes Staphylococcus aureus can get into the body and cause an infection.  This can be minor (such as pimples, boils, or other skin problems).  It might also be serious (such as a blood infection, pneumonia, or a surgical site infection).    What is Staphylococcus aureus colonization or carriage?  Colonization or carriage means that a person has the germ but is not sick from it.  These bacteria can be spread on the hands or when breathing or sneezing.    How is Staphylococcus aureus spread?  It is most often spread by close contact with a person or item that carries it.    What happens if my culture is positive for Staphylococcus aureus?  Your doctor/medical team will use this information to guide any antibiotic treatment which may be necessary.  Regardless of the culture results, we will clean the inside of your nose with a betadine swab just before you have your surgery.      Will I get an infection if I have Staphylococcus aureus in my nose or on my skin?  Anyone can get an infection with Staphylococcus aureus.  However, the best way to reduce your risk of infection is to follow the instructions provided to you for the use of your CHG soap and dental rinse.        Patient Information: Oral/Dental Rinse    What is oral/dental rinse?   It is a mouthwash. It is a way of cleaning the mouth with a germ-killing solution before your surgery.  The solution contains chlorhexidine, commonly known as CHG.   It is used inside the mouth to kill a bacteria known as Staphylococcus aureus.  Let your doctor know if you are allergic to Chlorhexidine.    Why do I need to use CHG oral/dental rinse?  The CHG oral/dental rinse helps to kill a bacteria in your mouth known as Staphylococcus aureus.     This reduces the risk of  infection at the surgical site.      Using your CHG oral/dental rinse  STEPS:  Use your CHG oral/dental rinse after you brush your teeth the night before (at bedtime) and the morning of your surgery.  Follow all directions on your prescription label.    Use the cap on the container to measure 15ml   Swish (gargle if you can) the mouthwash in your mouth for at least 30 seconds, (do not swallow) and spit out  After you use your CHG rinse, do not rinse your mouth with water, drink or eat.  Please refer to the prescription label for the appropriate time to resume oral intake      What side effects might I have using the CHG oral/dental rinse?  CHG rinse will stick to plaque on the teeth.  Brush and floss just before use.  Teeth brushing will help avoid staining of plaque during use.      Patient Information: Home Preoperative Antibacterial Shower      What is a home preoperative antibacterial shower?  This shower is a way of cleaning the skin with a germ-killing solution before surgery.  The solution contains chlorhexidine, commonly known as CHG.  CHG is a skin cleanser with germ-killing ability.  Let your doctor know if you are allergic to chlorhexidine.    Why do I need to take a preoperative antibacterial shower?  Skin is not sterile.  It is best to try to make your skin as free of germs as possible before surgery.  Proper cleansing with a germ-killing soap before surgery can lower the number of germs on your skin.  This helps to reduce the risk of infection at the surgical site.  Following the instructions listed below will help you prepare your skin for surgery.      How do I use the solution?  Steps:  Begin using your CHG soap 5 days before your scheduled surgery on ______1/16/2025__________________.    First, wash and rinse your hair using the CHG soap. Keep CHG soap away from ear canals and eyes.  Rinse completely, do not condition.  Hair extensions should be removed.  Wash your face with your normal soap and  rinse.    Apply the CHG solution to a clean wet washcloth.  Turn the water off or move away from the water spray to avoid premature rinsing of the CHG soap as you are applying.   Firmly lather your entire body from the neck down.  Do not use on your face.  Pay special attention to the area(s) where your incision(s) will be located unless they are on your face.  Avoid scrubbing your skin too hard.  The important point is to have the CHG soap sit on your skin for 3 minutes.    When the 3 minutes are up, turn on the water and rinse the CHG solution off your body completely.   DO NOT wash with regular soap after you have used the CHG soap solution  Pat yourself dry with a clean, freshly-laundered towel.  DO NOT apply powders, deodorants, or lotions.  Dress in clean, freshly laundered nightclothes.    Be sure to sleep with clean, freshly laundered sheets.  Be aware that CHG will cause stains on fabrics; if you wash them with bleach after use.  Rinse your washcloth and other linens that have contact with CHG completely.  Use only non-chlorine detergents to launder the items used.   The morning of surgery is the fifth day.  Repeat the above steps and dress in clean comfortable clothing     Whom should I contact if I have any questions regarding the use of CHG soap?  Call the [facility]

## 2025-01-03 NOTE — H&P (VIEW-ONLY)
CPM/PAT Evaluation       Name: Abhishek Olson (Abhishek Olson)  /Age: 1960/64 y.o.     Visit Type:   In-Person       Chief Complaint: right knee pain    HPI: Patient is a 65 yo M scheduled for right total knee replacement on 2025 with Dr. Dickey secondary to osteoarthritis.  Patient was referred by Dr. Dickey to CPM today for perioperative risk stratification and optimization. Patient's PMHx is notable for anxiety, HTN, HLD, post concussive syndrome, GERD.      Past Medical History:   Diagnosis Date    Anxiety     Arrhythmia     hx of afib s/p ablation    Depression     Essential (primary) hypertension 2022    Hypertension    GERD (gastroesophageal reflux disease)     History of blood transfusion         Mixed hyperlipidemia     Elevated cholesterol with high triglycerides    Other specified postprocedural states 2016    History of colonoscopy    Personal history of other diseases of urinary system     History of kidney problems    Post concussive syndrome     Sleep apnea        Past Surgical History:   Procedure Laterality Date    ABLATION OF DYSRHYTHMIC FOCUS      COLONOSCOPY      HIP ARTHROPLASTY      OTHER SURGICAL HISTORY  2019    Hernia repair    OTHER SURGICAL HISTORY Left 2019    Orbital blowout fracture repair    OTHER SURGICAL HISTORY Bilateral 2019    Hip surgery    SHOULDER SURGERY Bilateral 2015    Shoulder Surgery       Patient  has no history on file for sexual activity.    Family History   Problem Relation Name Age of Onset    Multiple myeloma Mother      Other (pulmonary hypertension) Father      Hypertension Brother         Allergies   Allergen Reactions    Penicillins Rash       Prior to Admission medications    Medication Sig Start Date End Date Taking? Authorizing Provider   aluminum hydrox-magnesium carb (Gaviscon Extra Strength) 160-105 mg tablet,chewable Chew. 22   Historical Provider, MD   aspirin 81 mg EC tablet Take by  mouth. 5/26/20   Historical Provider, MD   clindamycin (Cleocin) 300 mg capsule TAKE 2 CAPSULES BY MOUTH ONE HOUR PRIOR TO DENTAL VISIT 10/12/23   Historical Provider, MD   divalproex (Depakote) 250 mg EC tablet Take 1 tablet (250 mg) by mouth twice a day. 3/25/24   Historical Provider, MD   docusate sodium (Colace) 100 mg capsule Take by mouth every 12 hours. 5/26/20   Historical Provider, MD   escitalopram (Lexapro) 20 mg tablet Take 1 tablet (20 mg) by mouth once daily. 7/9/24 7/9/25  Cedrick Hernandez MD   esomeprazole (NexIUM) 40 mg DR capsule Take 1 capsule (40 mg) by mouth 2 times a day. 8/22/22   Historical Provider, MD   fluticasone (Flonase) 50 mcg/actuation nasal spray Administer into affected nostril(s). 10/15/18   Historical Provider, MD   hydrOXYzine pamoate (Vistaril) 25 mg capsule Take by mouth every 12 hours. 9/9/20   Historical Provider, MD   indomethacin SR (Indocin SR) 75 mg ER capsule Take 1 capsule (75 mg) by mouth 2 times a day as needed.    Historical Provider, MD   lisinopriL-hydrochlorothiazide 20-25 mg tablet Take 1 tablet by mouth once daily. 10/16/23 10/15/24  Cedrick Hernandez MD   lisinopriL-hydrochlorothiazide 20-25 mg tablet TAKE ONE TABLET BY MOUTH DAILY 11/14/24   Cedrick Hernandez MD   lisinopriL-hydrochlorothiazide 20-25 mg tablet Take 1 tablet by mouth once daily. 11/14/24   Cedrick Hernandez MD   meloxicam (Mobic) 15 mg tablet Take 1 tablet (15 mg) by mouth once daily. Take with food.    Historical Provider, MD   methylPREDNISolone (Medrol) 4 mg tablet Take by mouth. 8/13/23   Historical Provider, MD   mupirocin (Bactroban) 2 % ointment every 12 hours. 4/3/19   Historical Provider, MD   naproxen (Naprosyn) 375 mg tablet Take 1 tablet (375 mg) by mouth every 12 hours. 5/26/20   Historical Provider, MD   omeprazole (PriLOSEC) 20 mg DR capsule Take by mouth once daily. 5/3/19   Historical Provider, MD   ondansetron (Zofran) 4 mg tablet Take by mouth. 5/26/20   Historical Provider, MD   predniSONE  (Deltasone) 20 mg tablet Take by mouth once daily. 10/25/18   Historical Provider, MD   rosuvastatin (Crestor) 20 mg tablet TAKE ONE TABLET BY MOUTH DAILY 11/14/24   Cedrick Hernandez MD   sildenafil (Viagra) 50 mg tablet Take by mouth. 7/23/18   Historical Provider, MD   sodium,potassium,mag sulfates (Suprep Bowel Prep Kit) 17.5-3.13-1.6 gram recon soln solution Take by mouth. 2/10/22   Historical Provider, MD   tadalafil (Cialis) 20 mg tablet Take 1 tablet (20 mg) by mouth 1 time if needed for erectile dysfunction (take 1 hour prior to activity). 10/16/23 12/25/23  Cedrick Hernandez MD   terbinafine (LamISIL) 250 mg tablet Take by mouth once daily. 3/7/22   Historical Provider, MD   testosterone 20.25 mg/1.25 gram (1.62 %) gel in metered-dose pump apply 4 pumps topically to the upper arm once daily 3/22/24   Patrick Toro MD   testosterone 20.25 mg/1.25 gram (1.62 %) gel in metered-dose pump APPLY 4 PUMPS TO UPPER ARM DAILY FOR LOW TESTOSTERONE 9/17/24   Patrick Toro MD   testosterone enanthate (Xyosted) 75 mg/0.5 mL auto-injector Inject 1 Syringe (75 mg) under the skin every 7 days. 9/16/24   Patrick Toro MD   topiramate (Topamax) 25 mg tablet TAKE 1 TABLET BY MOUTH AT NIGHT FOR 1 WEEK  THEN INCREASE TO 2 TABLETS AT NIGHT. 4/23/24   Historical Provider, MD   venlafaxine XR (Effexor-XR) 75 mg 24 hr capsule Take 1 capsule (75 mg) by mouth once daily. 11/19/23   Historical Provider, MD   ZOLMitriptan (Zomig-ZMT) 5 mg disintegrating tablet Take by mouth. 8/10/23   Historical Provider, MD GREGORIO ROS:   Constitutional:   neg    Neuro/Psych:   neg    Eyes:   neg    Ears:   neg    Nose:   neg    Mouth:   neg    Throat:   neg    Neck:   neg    Cardio:   neg    Respiratory:   neg    Endocrine:   neg    GI:   neg    :   neg    Musculoskeletal:    +R knee pain  Hematologic:   neg    Skin:  neg        Physical Exam  Vitals and nursing note reviewed.   Constitutional:       General: He is not in acute distress.     Appearance: He  "is normal weight. He is not ill-appearing or toxic-appearing.   HENT:      Head: Normocephalic and atraumatic.      Right Ear: External ear normal.      Left Ear: External ear normal.      Nose: Nose normal.      Mouth/Throat:      Mouth: Mucous membranes are moist.   Eyes:      Extraocular Movements: Extraocular movements intact.      Conjunctiva/sclera: Conjunctivae normal.   Neck:      Vascular: No carotid bruit.   Cardiovascular:      Rate and Rhythm: Normal rate and regular rhythm.      Pulses: Normal pulses.      Heart sounds: Normal heart sounds.   Pulmonary:      Effort: Pulmonary effort is normal.      Breath sounds: Normal breath sounds.   Abdominal:      General: There is no distension.      Palpations: Abdomen is soft.      Tenderness: There is no abdominal tenderness.   Musculoskeletal:         General: Normal range of motion.      Cervical back: Normal range of motion.   Skin:     General: Skin is warm and dry.      Capillary Refill: Capillary refill takes less than 2 seconds.   Neurological:      General: No focal deficit present.      Mental Status: He is alert.   Psychiatric:         Mood and Affect: Mood normal.         Behavior: Behavior normal.          PAT AIRWAY:   Airway:     Mallampati::  II    TM distance::  >3 FB    Neck ROM::  Full             Visit Vitals  BP (!) 132/99   Pulse 80   Temp 36.5 °C (97.7 °F) (Temporal)   Resp 16   Ht 1.88 m (6' 2\")   Wt 123 kg (271 lb 6.2 oz)   SpO2 95%   BMI 34.84 kg/m²   Smoking Status Never   BSA 2.53 m²       DASI Risk Score      Flowsheet Row Pre-Admission Testing from 1/3/2025 in [facility]   Can you take care of yourself (eat, dress, bathe, or use toilet)?  2.75 filed at 01/03/2025 1227   Can you walk indoors, such as around your house? 1.75 filed at 01/03/2025 1227   Can you walk a block or two on level ground?  2.75 filed at 01/03/2025 1227   Can you climb a flight of stairs or walk up a hill? 5.5 filed at 01/03/2025 1227   Can you " run a short distance? 0 filed at 01/03/2025 1227   Can you do light work around the house like dusting or washing dishes? 2.7 filed at 01/03/2025 1227   Can you do moderate work around the house like vacuuming, sweeping floors or carrying groceries? 3.5 filed at 01/03/2025 1227   Can you do heavy work around the house like scrubbing floors or lifting and moving heavy furniture?  0 filed at 01/03/2025 1227   Can you do yard work like raking leaves, weeding or pushing a mower? 4.5 filed at 01/03/2025 1227   Can you have sexual relations? 5.25 filed at 01/03/2025 1227   Can you participate in moderate recreational activities like golf, bowling, dancing, doubles tennis or throwing a baseball or football? 0 filed at 01/03/2025 1227   Can you participate in strenous sports like swimming, singles tennis, football, basketball, or skiing? 0 filed at 01/03/2025 1227   DASI SCORE 28.7 filed at 01/03/2025 1227   METS Score (Will be calculated only when all the questions are answered) 6.3 filed at 01/03/2025 1227          Caprini DVT Assessment      Flowsheet Row Pre-Admission Testing from 1/3/2025 in [facility]   DVT Score 13 filed at 01/03/2025 1226   Medical Factors Family history of DVT/PE filed at 01/03/2025 1226   Surgical Factors Elective major lower extremity arthroplasty filed at 01/03/2025 1226   BMI 31-40 (Obesity) filed at 01/03/2025 1226          Modified Frailty Index      Flowsheet Row Pre-Admission Testing from 1/3/2025 in [facility]   Non-independent functional status (problems with dressing, bathing, personal grooming, or cooking) 0 filed at 01/03/2025 1228   History of diabetes mellitus  0 filed at 01/03/2025 1228   History of COPD 0 filed at 01/03/2025 1228   History of CHF No filed at 01/03/2025 1228   History of MI 0 filed at 01/03/2025 1228   History of Percutaneous Coronary Intervention, Cardiac Surgery, or Angina No filed at 01/03/2025 1228   Hypertension requiring the  use of medication  0.0909 filed at 01/03/2025 1228   Peripheral vascular disease 0 filed at 01/03/2025 1228   Impaired sensorium (cognitive impairement or loss, clouding, or delirium) 0 filed at 01/03/2025 1228   TIA or CVA withouy residual deficit 0 filed at 01/03/2025 1228   Cerebrovascular accident with deficit 0 filed at 01/03/2025 1228   Modified Frailty Index Calculator .0909 filed at 01/03/2025 1228          CHADS2 Stroke Risk  Current as of 41 minutes ago        N/A 3 to 100%: High Risk   2 to < 3%: Medium Risk   0 to < 2%: Low Risk     Last Change: N/A          This score determines the patient's risk of having a stroke if the patient has atrial fibrillation.        This score is not applicable to this patient. Components are not calculated.          Revised Cardiac Risk Index      Flowsheet Row Pre-Admission Testing from 1/3/2025 in [facility]   High-Risk Surgery (Intraperitoneal, Intrathoracic,Suprainguinal vascular) 0 filed at 01/03/2025 1228   History of ischemic heart disease (History of MI, History of positive exercuse test, Current chest paint considered due to myocardial ischemia, Use of nitrate therapy, ECG with pathological Q Waves) 0 filed at 01/03/2025 1228   History of congestive heart failure (pulmonary edemia, bilateral rales or S3 gallop, Paroxysmal nocturnal dyspnea, CXR showing pulmonary vascular redistribution) 0 filed at 01/03/2025 1228   History of cerebrovascular disease (Prior TIA or stroke) 0 filed at 01/03/2025 1228   Pre-operative insulin treatment 0 filed at 01/03/2025 1228   Pre-operative creatinine>2 mg/dl 0 filed at 01/03/2025 1228   Revised Cardiac Risk Calculator 0 filed at 01/03/2025 1228          Apfel Simplified Score      Flowsheet Row Pre-Admission Testing from 1/3/2025 in [facility]   Smoking status 1 filed at 01/03/2025 1228   History of motion sickness or PONV  0 filed at 01/03/2025 1228   Use of postoperative opioids 0 filed at  01/03/2025 1228   Gender - Female 0=No filed at 01/03/2025 1228   Apfel Simplified Score Calculator 1 filed at 01/03/2025 1228          Risk Analysis Index Results This Encounter    No data found in the last 10 encounters.       Stop Bang Score      Flowsheet Row Pre-Admission Testing from 1/3/2025 in [facility]   Do you snore loudly? 1 filed at 01/03/2025 1147   Do you often feel tired or fatigued after your sleep? 1 filed at 01/03/2025 1147   Has anyone ever observed you stop breathing in your sleep? 1 filed at 01/03/2025 1147   Do you have or are you being treated for high blood pressure? 1 filed at 01/03/2025 1147   Recent BMI (Calculated) 34.8 filed at 01/03/2025 1147   Is BMI greater than 35 kg/m2? 0=No filed at 01/03/2025 1147   Age older than 50 years old? 1=Yes filed at 01/03/2025 1147   Is your neck circumference greater than 17 inches (Male) or 16 inches (Female)? 1 filed at 01/03/2025 1147   Gender - Male 1=Yes filed at 01/03/2025 1147   STOP-BANG Total Score 7 filed at 01/03/2025 1147          Prodigy: High Risk  Total Score: 16              Prodigy Age Score      Prodigy Gender Score          ARISCAT Score for Postoperative Pulmonary Complications      Flowsheet Row Pre-Admission Testing from 1/3/2025 in [facility]   Age, years  3 filed at 01/03/2025 1242   Preoperative SpO2 8 filed at 01/03/2025 1242   Respiratory infection in the last month Either upper or lower (i.e., URI, bronchitis, pneumonia), with fever and antibiotic treatment 0 filed at 01/03/2025 1242   Preoperative anemoa (Hgb less than 10 g/dl) 0 filed at 01/03/2025 1242   Surgical incision  0 filed at 01/03/2025 1242   Duration of surgery  16 filed at 01/03/2025 1242   Emergency Procedure  0 filed at 01/03/2025 1242   ARISCAT Total Score  27 filed at 01/03/2025 1242          [place] Perioperative Risk for Myocardial Infarction or Cardiac Arrest (RABIA)      Flowsheet Row Pre-Admission Testing from 1/3/2025 in  [facility]   Age 1.24 filed at 01/03/2025 1246   Functional Status  0 filed at 01/03/2025 1246   ASA Class  -3.29 filed at 01/03/2025 1246   Creatinine 0 filed at 01/03/2025 1246   Type of Procedure  0.80 filed at 01/03/2025 1246   RABIA Total Score  -6.5 filed at 01/03/2025 1246   RABIA % 0.15 filed at 01/03/2025 1246            Assessment & Plan    Neuro:     -Post concussive syndrome/depression- sees neurology, takes lexapro, gabapentin, and venlafaxine  The patient is at an increased risk for post operative delirium secondary to depression.  Preoperative brain exercise educational handout provided to patient.    The patient is at an increased risk for perioperative stroke secondary to HTN and HLD.    HEENT/Airway:   -GABRIELA - noncompliant with CPAP, encouraged CPAP use  STOP-BANG Score- 7 points highrisk for GABRIELA    Mallampati::  II    TM distance::  >3 FB    Neck ROM::  Full  Dentures-denies  Crowns-reports  Implants-reports    Cardiovascular:    -HTN - on lisnopril-hydrochlorothiazide (24 hour hold)  -HLD- on rosuvastatin (continue)  -Hx of afib - s/p ablation in 2008  No chest pain, shortness of breath, BENTON, palpitations or lightheadedness/dizziness  Was previously taking aspirin 81mg for prevention but notes he stopped taking it months ago    METS: 6.3, limited by knee pain  RCRI: 0 points, 3.9%    30 day risk of MACE (risk for cardiac death, nonfatal myocardial infarction, and nonfactal cardiac arrest  RABIA:   0.15 % risk of intraoperative or 30-day postoperative MACE  EKG -normal EKG, normal sinus rhythm Rate- No acute changes    Pulmonary:     No diagnosis or significant findings on chart review or clinical presentation and evaluation.   ARISCAT: <26 points, 1.6% risk of in-hospital postoperative pulmonary complication  PRODIGY: High risk for opioid induced respiratory depression  Smoking History-He has never smoked.  Preoperative deep breathing educational handout provided to  patient.    Renal:  No diagnosis or significant findings on chart review or clinical presentation and evaluation, however, the patient is at increased risk of perioperative renal complications secondary to age>/= 56, male sex, and HTN. Preventative measures include BP monitoring, medication compliance, and hydration management.   CMP-Pending    Endocrine:  No diagnosis or significant findings on chart review or clinical presentation and evaluation.     Hematologic:    No diagnosis or significant findings on chart review or clinical presentation and evaluation.  The patient is not a Jehovah’s witness and will accept blood and blood products if medically indicated.   History of previous blood transfusions Yes - 1984  CBC-Pending    Caprini Score 13, patient at High for postoperative DVT. Pt supplied education/VTE handout  Anticoagulation use: No   Preoperative DVT educational handout provided to patient.    Gastrointestinal:     -GERD - not currently on any meds  Recreational drug use: Drug use No  Alcohol use none    Apfel: 1 points 21% risk for post operative N/V    Infectious disease:    No diagnosis or significant findings on chart review or clinical presentation and evaluation.   Prescription provided for CHG body wash and dental rinse. CHG use instructions reviewed and provided to patient. Patient advised to call [facility] if rx not received.   Staph screen collected-Pending    Musculoskeletal:  No diagnosis or significant findings on chart review or clinical presentation and evaluation.      Anesthesia:  ASA 2 - Patient with mild systemic disease with no functional limitations    History of General anesthesia- yes  Complications- No anesthesia complications  No family history of anesthesia complications    Nickel/metal allergy-negative  Shellfish allergy-negative    Discussed with patient medication instructions, NPO guidelines, and any questions or concerns. Patient does not need further workup prior to  preceding with elective surgery based on based on risk assessment.       Shivani Khan PA-C 1/3/2025 12:48 PM      Pending labs ordered:  cbc, comp, A1c, and MSSA/MRSA culture  Follow up needed: labs

## 2025-01-03 NOTE — GROUP NOTE
In addition to the group class activities, Abhishek Olson had the following lab work completed:  No orders of the defined types were placed in this encounter.      A new History and Physical was not completed.    This class lasted approximately 2 hours and had 5 participants. The nurse instructor covered the following topics:    MyChart Enrollment  Communication with Care Team  My Chart is the best form of communication to reach all of your caregivers  You can send messages to specific care givers, or a care team  Continued Education  You will be enrolled in a Joint Replacement care plan to receive additional education before and after surgery  You can review a short recording of the class content - [email or web address]  Access to Medical Records  You can access test results, office notes, appointments, etc.  You can connect to other healthcare systems who use [facility] ([facility], [facility], [facility], etc.)  [facility] System Integration  Program Information  Consent to Enroll    Background/Understanding of Joint Replacement Surgery  Potential for same day discharge  Any questions or concerns to be directed to the surgeon's office  Not all patients are appropriate for same day discharge    How to Prepare for Surgery  Use of Nicotine Products/Smoking  Stop several weeks before surgery  Such products slow down the healing process and increase risk of post-op infection and complications  Clearance for Surgery  Medical Clearance by Specialists  Dental Clearance  Cracked/Broken/Loose teeth left untreated may postpone surgery  The importance of post-op antibiotics for dental visits per surgeon protocol  Preadmission Testing  **Potential for postponed surgery if appropriate clearance is not obtained  Medication Instruction  Follow instructions provided by the doctor who prescribes your medication (typically, but not limited to cardiologist)  Preadmission testing will provide additional instructions during your appointment on  what to stop and what to take as you get closer to surgery  For clarification of these instructions, please call preadmission testing directly - [phone]  Tips for Preparing the home for discharge from the hospital  Care Partner  Requirement for surgery, the patient must have a plan to have help at home  Potential for postponed surgery if plan for home support cannot be established  Your Care Partner does not need medical training  How the care partner can help after surgery  CHG Body Wash/Mouth Wash  Follow the instructions given at preadmission testing  Body wash is to be used on the body and hair for 5 washes  Mouthwash is to be used the night before and morning of surgery  **This is a system-wide protocol developed by infectious disease professionals, we will not alter our recommendations for those with sensitive skin or those who have special hair needs.  Please follow the instructions as they are written as this will provide the best infection prevention measures for surgery.  Should you have an allergy to one of the products, please discuss with your preadmission team**    What to Expect in the Hospital/At Home  Morning of Surgery NPO Guidelines  Nothing to eat after midnight  Water can be consumed up to 2 hours prior to arrival  Surgical and Post-Surgical Care Team  Surgical Team  Anesthesia Team  Nursing  Physical Therapy  Care Coordinating  Pharmacy  Hospital Arrival Instructions  Arrive at the time provided to you  Consider traffic patterns (rush-hour) based on arrival time  Have arrangements made for a ride home  If discharging same day, care partner should remain at the hospital  Recovering after Surgery  Recovery Room - Visitors are not brought back  Transition to hospital room - 2nd Floor, Visitors will be directed to your room  The presence of and strategies for controlling surgical pain and swelling  The importance of early mobility  Side effects after surgery  What to expect if staying  overnight    Discharge Planning  The intended plan for discharge will be for patients to discharge home  All patients require a care partner (family, friend, neighbor, etc.) to stay with the patient for the first few nights after surgery  The inability to secure help at home may postpone surgery  Home Care Services set up per surgeon order  Physical Therapy  Occupational Therapy  **If desired, private duty care can be arranged by the patient ahead of time**  Outpatient Physical Therapy per surgeon order    Recovering at Home  Wound Care  Follow wound care instructions found in your discharge paperwork  Bandage is water-resistant and you may shower with the bandage  Do not scrub directly over the bandage  Do not submerge in water until cleared (bathtub, hot tub, pool, etc.)    Post-Op Risk Prevention  Infection Prevention  Promptly seek treatment for any infections post-operatively  Routine dental visits must be postponed for 3 months after surgery  Your surgeon may require antibiotics prior to future dental visits  Any concerns for infection not related directly to the knee or the hip should be managed by your primary care provider  Blood Clots  Be sure to complete the course of blood thinning medication as prescribed by your surgeon  Movement every 1-2 hours during the day is encouraged to prevent blood clots  Monitor for signs of blood clots  Wear compression stockings as prescribed by your surgeon  Constipation  Constipation is common following surgery  Drink plenty of fluids  Take stool softener/laxative as prescribed by your surgeon  Move around frequently  Eat foods high in fiber  Fall Prevention  Prepare home ahead of time to clear space to move with walker  Remove throw rugs and electrical cords from walkways  Install railings near any stairways with more than 2 steps  Use night lights for increased visibility at night  Continue to use your assistive device until cleared by surgeon or physical  therapy  Dislocation Prevention - Not all procedures will have dislocation precautions  Follow dislocation precautions provided by your surgeon  It is OK to resume sexual activity about 6 weeks following surgery  Be sure to follow any dislocation precautions assigned    Durable Medical Equipment  Cold Therapy  Breg Cold Therapy Machines  Ice/Gel Packs  Assistive Devices  Folding Walker with Wheels (in the front only)  No Rollators  Crutches if approved by Physical Therapy and Surgeon after surgery  Hip Kits  Raised Toilet Seats  Additional Compression Stockings    Joint Preservation  Healthy Activities when Cleared  Walking  Swimming  Bike Riding  Activities to Avoid  Refrain from repetitive motions which have a high impact on the joint  Gradual Progression  Progress activity slowly, listen to your body  Common Findings - NORMAL after surgery  Clicking/Grinding  Numbness near incision    Physical Therapy  Prehabilitation exercises  START TODAY ON BOTH LEGS  Surgery Specific Precautions  Follow surgery specific precautions found in your discharge paperwork    Follow-Up Visit  All patients will see their surgeon for a follow up visit after surgery  The visit may range from 2-6 weeks after surgery and is surgeon specific      Please don't hesitate to reach out if you have any additional questions or concerns.    Emelia Danielson MBA, BSN, RN-BC, ONC  Mellisa Arias RN  [facility]  [facility]   [phone]

## 2025-01-03 NOTE — CPM/PAT H&P
CPM/PAT Evaluation       Name: Abhishek Olson (Abhishek Olson)  /Age: 1960/64 y.o.     Visit Type:   In-Person       Chief Complaint: right knee pain    HPI: Patient is a 63 yo M scheduled for right total knee replacement on 2025 with Dr. Dickey secondary to osteoarthritis.  Patient was referred by Dr. Dickey to CPM today for perioperative risk stratification and optimization. Patient's PMHx is notable for anxiety, HTN, HLD, post concussive syndrome, GERD.      Past Medical History:   Diagnosis Date    Anxiety     Arrhythmia     hx of afib s/p ablation    Depression     Essential (primary) hypertension 2022    Hypertension    GERD (gastroesophageal reflux disease)     History of blood transfusion         Mixed hyperlipidemia     Elevated cholesterol with high triglycerides    Other specified postprocedural states 2016    History of colonoscopy    Personal history of other diseases of urinary system     History of kidney problems    Post concussive syndrome     Sleep apnea        Past Surgical History:   Procedure Laterality Date    ABLATION OF DYSRHYTHMIC FOCUS      COLONOSCOPY      HIP ARTHROPLASTY      OTHER SURGICAL HISTORY  2019    Hernia repair    OTHER SURGICAL HISTORY Left 2019    Orbital blowout fracture repair    OTHER SURGICAL HISTORY Bilateral 2019    Hip surgery    SHOULDER SURGERY Bilateral 2015    Shoulder Surgery       Patient  has no history on file for sexual activity.    Family History   Problem Relation Name Age of Onset    Multiple myeloma Mother      Other (pulmonary hypertension) Father      Hypertension Brother         Allergies   Allergen Reactions    Penicillins Rash       Prior to Admission medications    Medication Sig Start Date End Date Taking? Authorizing Provider   aluminum hydrox-magnesium carb (Gaviscon Extra Strength) 160-105 mg tablet,chewable Chew. 22   Historical Provider, MD   aspirin 81 mg EC tablet Take by  mouth. 5/26/20   Historical Provider, MD   clindamycin (Cleocin) 300 mg capsule TAKE 2 CAPSULES BY MOUTH ONE HOUR PRIOR TO DENTAL VISIT 10/12/23   Historical Provider, MD   divalproex (Depakote) 250 mg EC tablet Take 1 tablet (250 mg) by mouth twice a day. 3/25/24   Historical Provider, MD   docusate sodium (Colace) 100 mg capsule Take by mouth every 12 hours. 5/26/20   Historical Provider, MD   escitalopram (Lexapro) 20 mg tablet Take 1 tablet (20 mg) by mouth once daily. 7/9/24 7/9/25  Cedrick Hernandez MD   esomeprazole (NexIUM) 40 mg DR capsule Take 1 capsule (40 mg) by mouth 2 times a day. 8/22/22   Historical Provider, MD   fluticasone (Flonase) 50 mcg/actuation nasal spray Administer into affected nostril(s). 10/15/18   Historical Provider, MD   hydrOXYzine pamoate (Vistaril) 25 mg capsule Take by mouth every 12 hours. 9/9/20   Historical Provider, MD   indomethacin SR (Indocin SR) 75 mg ER capsule Take 1 capsule (75 mg) by mouth 2 times a day as needed.    Historical Provider, MD   lisinopriL-hydrochlorothiazide 20-25 mg tablet Take 1 tablet by mouth once daily. 10/16/23 10/15/24  Cedrick Hernandez MD   lisinopriL-hydrochlorothiazide 20-25 mg tablet TAKE ONE TABLET BY MOUTH DAILY 11/14/24   Cedrick Hernandez MD   lisinopriL-hydrochlorothiazide 20-25 mg tablet Take 1 tablet by mouth once daily. 11/14/24   Cedrick Hernandez MD   meloxicam (Mobic) 15 mg tablet Take 1 tablet (15 mg) by mouth once daily. Take with food.    Historical Provider, MD   methylPREDNISolone (Medrol) 4 mg tablet Take by mouth. 8/13/23   Historical Provider, MD   mupirocin (Bactroban) 2 % ointment every 12 hours. 4/3/19   Historical Provider, MD   naproxen (Naprosyn) 375 mg tablet Take 1 tablet (375 mg) by mouth every 12 hours. 5/26/20   Historical Provider, MD   omeprazole (PriLOSEC) 20 mg DR capsule Take by mouth once daily. 5/3/19   Historical Provider, MD   ondansetron (Zofran) 4 mg tablet Take by mouth. 5/26/20   Historical Provider, MD   predniSONE  (Deltasone) 20 mg tablet Take by mouth once daily. 10/25/18   Historical Provider, MD   rosuvastatin (Crestor) 20 mg tablet TAKE ONE TABLET BY MOUTH DAILY 11/14/24   Cedrick Hernandez MD   sildenafil (Viagra) 50 mg tablet Take by mouth. 7/23/18   Historical Provider, MD   sodium,potassium,mag sulfates (Suprep Bowel Prep Kit) 17.5-3.13-1.6 gram recon soln solution Take by mouth. 2/10/22   Historical Provider, MD   tadalafil (Cialis) 20 mg tablet Take 1 tablet (20 mg) by mouth 1 time if needed for erectile dysfunction (take 1 hour prior to activity). 10/16/23 12/25/23  Cedrick Hernandez MD   terbinafine (LamISIL) 250 mg tablet Take by mouth once daily. 3/7/22   Historical Provider, MD   testosterone 20.25 mg/1.25 gram (1.62 %) gel in metered-dose pump apply 4 pumps topically to the upper arm once daily 3/22/24   Patrick Toro MD   testosterone 20.25 mg/1.25 gram (1.62 %) gel in metered-dose pump APPLY 4 PUMPS TO UPPER ARM DAILY FOR LOW TESTOSTERONE 9/17/24   Patrick Toro MD   testosterone enanthate (Xyosted) 75 mg/0.5 mL auto-injector Inject 1 Syringe (75 mg) under the skin every 7 days. 9/16/24   Patrick Toro MD   topiramate (Topamax) 25 mg tablet TAKE 1 TABLET BY MOUTH AT NIGHT FOR 1 WEEK  THEN INCREASE TO 2 TABLETS AT NIGHT. 4/23/24   Historical Provider, MD   venlafaxine XR (Effexor-XR) 75 mg 24 hr capsule Take 1 capsule (75 mg) by mouth once daily. 11/19/23   Historical Provider, MD   ZOLMitriptan (Zomig-ZMT) 5 mg disintegrating tablet Take by mouth. 8/10/23   Historical Provider, MD GREGORIO ROS:   Constitutional:   neg    Neuro/Psych:   neg    Eyes:   neg    Ears:   neg    Nose:   neg    Mouth:   neg    Throat:   neg    Neck:   neg    Cardio:   neg    Respiratory:   neg    Endocrine:   neg    GI:   neg    :   neg    Musculoskeletal:    +R knee pain  Hematologic:   neg    Skin:  neg        Physical Exam  Vitals and nursing note reviewed.   Constitutional:       General: He is not in acute distress.     Appearance: He  "is normal weight. He is not ill-appearing or toxic-appearing.   HENT:      Head: Normocephalic and atraumatic.      Right Ear: External ear normal.      Left Ear: External ear normal.      Nose: Nose normal.      Mouth/Throat:      Mouth: Mucous membranes are moist.   Eyes:      Extraocular Movements: Extraocular movements intact.      Conjunctiva/sclera: Conjunctivae normal.   Neck:      Vascular: No carotid bruit.   Cardiovascular:      Rate and Rhythm: Normal rate and regular rhythm.      Pulses: Normal pulses.      Heart sounds: Normal heart sounds.   Pulmonary:      Effort: Pulmonary effort is normal.      Breath sounds: Normal breath sounds.   Abdominal:      General: There is no distension.      Palpations: Abdomen is soft.      Tenderness: There is no abdominal tenderness.   Musculoskeletal:         General: Normal range of motion.      Cervical back: Normal range of motion.   Skin:     General: Skin is warm and dry.      Capillary Refill: Capillary refill takes less than 2 seconds.   Neurological:      General: No focal deficit present.      Mental Status: He is alert.   Psychiatric:         Mood and Affect: Mood normal.         Behavior: Behavior normal.          PAT AIRWAY:   Airway:     Mallampati::  II    TM distance::  >3 FB    Neck ROM::  Full             Visit Vitals  BP (!) 132/99   Pulse 80   Temp 36.5 °C (97.7 °F) (Temporal)   Resp 16   Ht 1.88 m (6' 2\")   Wt 123 kg (271 lb 6.2 oz)   SpO2 95%   BMI 34.84 kg/m²   Smoking Status Never   BSA 2.53 m²       DASI Risk Score      Flowsheet Row Pre-Admission Testing [facility]/3/[facility]   Can you take care of yourself (eat, dress, bathe, or use toilet)?  2.75 filed at 01/03/2025 1227   Can you walk indoors, such as around your house? 1.75 filed at 01/03/2025 1227   Can you walk a block or two on level ground?  2.75 filed at 01/03/2025 1227   Can you climb a flight of stairs or walk up a hill? 5.5 filed at 01/03/2025 1227   Can you " run a short distance? 0 filed at 01/03/2025 1227   Can you do light work around the house like dusting or washing dishes? 2.7 filed at 01/03/2025 1227   Can you do moderate work around the house like vacuuming, sweeping floors or carrying groceries? 3.5 filed at 01/03/2025 1227   Can you do heavy work around the house like scrubbing floors or lifting and moving heavy furniture?  0 filed at 01/03/2025 1227   Can you do yard work like raking leaves, weeding or pushing a mower? 4.5 filed at 01/03/2025 1227   Can you have sexual relations? 5.25 filed at 01/03/2025 1227   Can you participate in moderate recreational activities like golf, bowling, dancing, doubles tennis or throwing a baseball or football? 0 filed at 01/03/2025 1227   Can you participate in strenous sports like swimming, singles tennis, football, basketball, or skiing? 0 filed at 01/03/2025 1227   DASI SCORE 28.7 filed at 01/03/2025 1227   METS Score (Will be calculated only when all the questions are answered) 6.3 filed at 01/03/2025 1227          Caprini DVT Assessment      Flowsheet Row Pre-Admission Testing from 1/3/2025 in [facility]   DVT Score 13 filed at 01/03/2025 1226   Medical Factors Family history of DVT/PE filed at 01/03/2025 1226   Surgical Factors Elective major lower extremity arthroplasty filed at 01/03/2025 1226   BMI 31-40 (Obesity) filed at 01/03/2025 1226          Modified Frailty Index      Flowsheet Row Pre-Admission Testing from 1/3/2025 in [facility]   Non-independent functional status (problems with dressing, bathing, personal grooming, or cooking) 0 filed at 01/03/2025 1228   History of diabetes mellitus  0 filed at 01/03/2025 1228   History of COPD 0 filed at 01/03/2025 1228   History of CHF No filed at 01/03/2025 1228   History of MI 0 filed at 01/03/2025 1228   History of Percutaneous Coronary Intervention, Cardiac Surgery, or Angina No filed at 01/03/2025 1228   Hypertension requiring the  use of medication  0.0909 filed at 01/03/2025 1228   Peripheral vascular disease 0 filed at 01/03/2025 1228   Impaired sensorium (cognitive impairement or loss, clouding, or delirium) 0 filed at 01/03/2025 1228   TIA or CVA withouy residual deficit 0 filed at 01/03/2025 1228   Cerebrovascular accident with deficit 0 filed at 01/03/2025 1228   Modified Frailty Index Calculator .0909 filed at 01/03/2025 1228          CHADS2 Stroke Risk  Current as of 41 minutes ago        N/A 3 to 100%: High Risk   2 to < 3%: Medium Risk   0 to < 2%: Low Risk     Last Change: N/A          This score determines the patient's risk of having a stroke if the patient has atrial fibrillation.        This score is not applicable to this patient. Components are not calculated.          Revised Cardiac Risk Index      Flowsheet Row Pre-Admission Testing from 1/3/2025 in [facility]   High-Risk Surgery (Intraperitoneal, Intrathoracic,Suprainguinal vascular) 0 filed at 01/03/2025 1228   History of ischemic heart disease (History of MI, History of positive exercuse test, Current chest paint considered due to myocardial ischemia, Use of nitrate therapy, ECG with pathological Q Waves) 0 filed at 01/03/2025 1228   History of congestive heart failure (pulmonary edemia, bilateral rales or S3 gallop, Paroxysmal nocturnal dyspnea, CXR showing pulmonary vascular redistribution) 0 filed at 01/03/2025 1228   History of cerebrovascular disease (Prior TIA or stroke) 0 filed at 01/03/2025 1228   Pre-operative insulin treatment 0 filed at 01/03/2025 1228   Pre-operative creatinine>2 mg/dl 0 filed at 01/03/2025 1228   Revised Cardiac Risk Calculator 0 filed at 01/03/2025 1228          Apfel Simplified Score      Flowsheet Row Pre-Admission Testing from 1/3/2025 in [facility]   Smoking status 1 filed at 01/03/2025 1228   History of motion sickness or PONV  0 filed at 01/03/2025 1228   Use of postoperative opioids 0 filed at  01/03/2025 1228   Gender - Female 0=No filed at 01/03/2025 1228   Apfel Simplified Score Calculator 1 filed at 01/03/2025 1228          Risk Analysis Index Results This Encounter    No data found in the last 10 encounters.       Stop Bang Score      Flowsheet Row Pre-Admission Testing from 1/3/2025 in [facility]   Do you snore loudly? 1 filed at 01/03/2025 1147   Do you often feel tired or fatigued after your sleep? 1 filed at 01/03/2025 1147   Has anyone ever observed you stop breathing in your sleep? 1 filed at 01/03/2025 1147   Do you have or are you being treated for high blood pressure? 1 filed at 01/03/2025 1147   Recent BMI (Calculated) 34.8 filed at 01/03/2025 1147   Is BMI greater than 35 kg/m2? 0=No filed at 01/03/2025 1147   Age older than 50 years old? 1=Yes filed at 01/03/2025 1147   Is your neck circumference greater than 17 inches (Male) or 16 inches (Female)? 1 filed at 01/03/2025 1147   Gender - Male 1=Yes filed at 01/03/2025 1147   STOP-BANG Total Score 7 filed at 01/03/2025 1147          Prodigy: High Risk  Total Score: 16              Prodigy Age Score      Prodigy Gender Score          ARISCAT Score for Postoperative Pulmonary Complications      Flowsheet Row Pre-Admission Testing from 1/3/2025 in [facility]   Age, years  3 filed at 01/03/2025 1242   Preoperative SpO2 8 filed at 01/03/2025 1242   Respiratory infection in the last month Either upper or lower (i.e., URI, bronchitis, pneumonia), with fever and antibiotic treatment 0 filed at 01/03/2025 1242   Preoperative anemoa (Hgb less than 10 g/dl) 0 filed at 01/03/2025 1242   Surgical incision  0 filed at 01/03/2025 1242   Duration of surgery  16 filed at 01/03/2025 1242   Emergency Procedure  0 filed at 01/03/2025 1242   ARISCAT Total Score  27 filed at 01/03/2025 1242          [place] Perioperative Risk for Myocardial Infarction or Cardiac Arrest (RABIA)      Flowsheet Row Pre-Admission Testing from 1/3/2025 in  [facility]   Age 1.24 filed at 01/03/2025 1246   Functional Status  0 filed at 01/03/2025 1246   ASA Class  -3.29 filed at 01/03/2025 1246   Creatinine 0 filed at 01/03/2025 1246   Type of Procedure  0.80 filed at 01/03/2025 1246   RABIA Total Score  -6.5 filed at 01/03/2025 1246   RABIA % 0.15 filed at 01/03/2025 1246            Assessment & Plan    Neuro:     -Post concussive syndrome/depression- sees neurology, takes lexapro, gabapentin, and venlafaxine  The patient is at an increased risk for post operative delirium secondary to depression.  Preoperative brain exercise educational handout provided to patient.    The patient is at an increased risk for perioperative stroke secondary to HTN and HLD.    HEENT/Airway:   -GABRIELA - noncompliant with CPAP, encouraged CPAP use  STOP-BANG Score- 7 points highrisk for GABRIELA    Mallampati::  II    TM distance::  >3 FB    Neck ROM::  Full  Dentures-denies  Crowns-reports  Implants-reports    Cardiovascular:    -HTN - on lisnopril-hydrochlorothiazide (24 hour hold)  -HLD- on rosuvastatin (continue)  -Hx of afib - s/p ablation in 2008  No chest pain, shortness of breath, BENTON, palpitations or lightheadedness/dizziness  Was previously taking aspirin 81mg for prevention but notes he stopped taking it months ago    METS: 6.3, limited by knee pain  RCRI: 0 points, 3.9%    30 day risk of MACE (risk for cardiac death, nonfatal myocardial infarction, and nonfactal cardiac arrest  RABIA:   0.15 % risk of intraoperative or 30-day postoperative MACE  EKG -normal EKG, normal sinus rhythm Rate- No acute changes    Pulmonary:     No diagnosis or significant findings on chart review or clinical presentation and evaluation.   ARISCAT: <26 points, 1.6% risk of in-hospital postoperative pulmonary complication  PRODIGY: High risk for opioid induced respiratory depression  Smoking History-He has never smoked.  Preoperative deep breathing educational handout provided to  patient.    Renal:  No diagnosis or significant findings on chart review or clinical presentation and evaluation, however, the patient is at increased risk of perioperative renal complications secondary to age>/= 56, male sex, and HTN. Preventative measures include BP monitoring, medication compliance, and hydration management.   CMP-Pending    Endocrine:  No diagnosis or significant findings on chart review or clinical presentation and evaluation.     Hematologic:    No diagnosis or significant findings on chart review or clinical presentation and evaluation.  The patient is not a Jehovah’s witness and will accept blood and blood products if medically indicated.   History of previous blood transfusions Yes - 1984  CBC-Pending    Caprini Score 13, patient at High for postoperative DVT. Pt supplied education/VTE handout  Anticoagulation use: No   Preoperative DVT educational handout provided to patient.    Gastrointestinal:     -GERD - not currently on any meds  Recreational drug use: Drug use No  Alcohol use none    Apfel: 1 points 21% risk for post operative N/V    Infectious disease:    No diagnosis or significant findings on chart review or clinical presentation and evaluation.   Prescription provided for CHG body wash and dental rinse. CHG use instructions reviewed and provided to patient. Patient advised to call [facility] if rx not received.   Staph screen collected-Pending    Musculoskeletal:  No diagnosis or significant findings on chart review or clinical presentation and evaluation.      Anesthesia:  ASA 2 - Patient with mild systemic disease with no functional limitations    History of General anesthesia- yes  Complications- No anesthesia complications  No family history of anesthesia complications    Nickel/metal allergy-negative  Shellfish allergy-negative    Discussed with patient medication instructions, NPO guidelines, and any questions or concerns. Patient does not need further workup prior to  preceding with elective surgery based on based on risk assessment.       Shivani Khan PA-C 1/3/2025 12:48 PM      Pending labs ordered:  cbc, comp, A1c, and MSSA/MRSA culture  Follow up needed: labs

## 2025-01-05 LAB — STAPHYLOCOCCUS SPEC CULT: NORMAL

## 2025-01-06 ENCOUNTER — PROCEDURE VISIT (OUTPATIENT)
Dept: NEUROLOGY | Facility: CLINIC | Age: 65
End: 2025-01-06
Payer: COMMERCIAL

## 2025-01-06 DIAGNOSIS — G43.709 CHRONIC MIGRAINE WITHOUT AURA WITHOUT STATUS MIGRAINOSUS, NOT INTRACTABLE: Primary | ICD-10-CM

## 2025-01-06 LAB
ATRIAL RATE: 72 BPM
P AXIS: 52 DEGREES
P OFFSET: 185 MS
P ONSET: 132 MS
PR INTERVAL: 172 MS
Q ONSET: 218 MS
QRS COUNT: 12 BEATS
QRS DURATION: 88 MS
QT INTERVAL: 410 MS
QTC CALCULATION(BAZETT): 448 MS
QTC FREDERICIA: 435 MS
R AXIS: 10 DEGREES
T AXIS: 41 DEGREES
T OFFSET: 423 MS
VENTRICULAR RATE: 72 BPM

## 2025-01-06 PROCEDURE — 64615 CHEMODENERV MUSC MIGRAINE: CPT | Performed by: STUDENT IN AN ORGANIZED HEALTH CARE EDUCATION/TRAINING PROGRAM

## 2025-01-06 PROCEDURE — 2500000004 HC RX 250 GENERAL PHARMACY W/ HCPCS (ALT 636 FOR OP/ED): Mod: JZ | Performed by: STUDENT IN AN ORGANIZED HEALTH CARE EDUCATION/TRAINING PROGRAM

## 2025-01-06 PROCEDURE — 96372 THER/PROPH/DIAG INJ SC/IM: CPT | Performed by: STUDENT IN AN ORGANIZED HEALTH CARE EDUCATION/TRAINING PROGRAM

## 2025-01-06 RX ADMIN — ONABOTULINUMTOXINA 175 UNITS: 100 INJECTION, POWDER, LYOPHILIZED, FOR SOLUTION INTRADERMAL; INTRAMUSCULAR at 11:23

## 2025-01-06 NOTE — PROGRESS NOTES
Neurology Botulinum Injection    Subjective   Abhishek Olson is an 64 y.o. male here for medical botulinum injection for Chronic migraine without aura without status migrainosus, not intractable [G43.709].     Headaches overall improved in severity by >50%. Denies any side effects to Botox and would like to continue.     Botox    Date/Time: 1/6/2025 11:10 AM    Performed by: Jens Boone DO  Authorized by: Jens Boone DO    Procedure specific details:      Procedure Note: PREEMPT Botox    Indications: Chronic Migraine    Informed consent was obtained (explaining the procedure and risks and benefits of procedure) from patient: the signed consent form was placed in the medical record.  A time out was completed, verifying correct patient, procedure,site, positioning, and implants or special equipment.    200 units of OnabotulinumtoxinA were reconstituted with saline. Sites of injection were identified via PREEMPT protocol and cleaned with alcohol pads. Using a 30 gauge needle, patient received following injections:    5 units in procerus  5 units in each left and right   10 units divided between 2 sites of L frontalis  10 units divided between 2 sites of R frontalis  20 units divided between 4 sites of L temporalis  20 units divided between 4 sites of R temporalis  15 units divided between 3 sites of L occipitalis  15 units divided between 3 sites of R occipitalis  10 units divided between 2 sites of L paracervical muscles  10 units divided between 2 sites of R paracervical muscles  15 units divided between 3 sites of L trapezius  15 units divided between 3 sites of R trapezius    Additional Sites:  10u L occipitalis  10u R occipitalis    Used: 175u  Wasted: 25u    There were no complications. Patient was comfortable and left without complaint.     OnabotulinumtoxinA  Lot #: [id number]  Exp: 4/2027

## 2025-01-08 ENCOUNTER — DOCUMENTATION (OUTPATIENT)
Dept: PREADMISSION TESTING | Facility: HOSPITAL | Age: 65
End: 2025-01-08
Payer: COMMERCIAL

## 2025-01-13 ENCOUNTER — TELEPHONE (OUTPATIENT)
Dept: ORTHOPEDIC SURGERY | Facility: HOSPITAL | Age: 65
End: 2025-01-13
Payer: COMMERCIAL

## 2025-01-13 NOTE — TELEPHONE ENCOUNTER
Thank you for taking my call today.  All questions were answered at the time of the call, but please feel free to reach out to me via DE[facility]hart or phone, [phone], with any new questions or concerns.       We confirmed that you opted to enroll in our [id number] program so your discharge prescriptions will be available to take home at the time of discharge.  Please bring any prescription insurance coverage with you on the morning of surgery so that we can enter the information into our system.     We confirmed that your plan would be to Discharge Home same day (Rapid Recovery).    We confirmed that you have DME needed for recovery.     Use the provided body wash for 4 days before surgery and complete a 5th shower on the morning of surgery, this includes your body and hair.  Follow the directions as provided during preadmission testing.  The mouth wash will be used the night before and the morning of surgery.       As a reminder, if you do not hear from our team, please call [phone] between 2pm and 3pm the business day before your surgery to confirm your arrival time and details.        Please don't hesitate to reach out with additional questions or concerns.    Emelia Danielson MBA, BSN, RN-BC  [facility]  [facility]  [phone]

## 2025-01-20 ENCOUNTER — PHARMACY VISIT (OUTPATIENT)
Dept: PHARMACY | Facility: CLINIC | Age: 65
End: 2025-01-20
Payer: COMMERCIAL

## 2025-01-20 ENCOUNTER — ANESTHESIA EVENT (OUTPATIENT)
Dept: OPERATING ROOM | Facility: HOSPITAL | Age: 65
End: 2025-01-20
Payer: COMMERCIAL

## 2025-01-20 ENCOUNTER — HOME HEALTH ADMISSION (OUTPATIENT)
Dept: HOME HEALTH SERVICES | Facility: HOME HEALTH | Age: 65
End: 2025-01-20
Payer: COMMERCIAL

## 2025-01-20 ENCOUNTER — HOSPITAL ENCOUNTER (OUTPATIENT)
Facility: HOSPITAL | Age: 65
Setting detail: OUTPATIENT SURGERY
Discharge: HOME HEALTH | End: 2025-01-20
Attending: ORTHOPAEDIC SURGERY | Admitting: ORTHOPAEDIC SURGERY
Payer: COMMERCIAL

## 2025-01-20 ENCOUNTER — ANESTHESIA (OUTPATIENT)
Dept: OPERATING ROOM | Facility: HOSPITAL | Age: 65
End: 2025-01-20
Payer: COMMERCIAL

## 2025-01-20 ENCOUNTER — DOCUMENTATION (OUTPATIENT)
Dept: HOME HEALTH SERVICES | Facility: HOME HEALTH | Age: 65
End: 2025-01-20

## 2025-01-20 VITALS
DIASTOLIC BLOOD PRESSURE: 96 MMHG | TEMPERATURE: 97.2 F | SYSTOLIC BLOOD PRESSURE: 139 MMHG | HEIGHT: 74 IN | HEART RATE: 80 BPM | OXYGEN SATURATION: 93 % | WEIGHT: 268.74 LBS | BODY MASS INDEX: 34.49 KG/M2 | RESPIRATION RATE: 16 BRPM

## 2025-01-20 DIAGNOSIS — Z96.651 S/P TOTAL KNEE ARTHROPLASTY, RIGHT: Primary | ICD-10-CM

## 2025-01-20 DIAGNOSIS — M17.11 UNILATERAL PRIMARY OSTEOARTHRITIS, RIGHT KNEE: ICD-10-CM

## 2025-01-20 PROBLEM — G47.33 OSA (OBSTRUCTIVE SLEEP APNEA): Status: ACTIVE | Noted: 2025-01-20

## 2025-01-20 PROCEDURE — 2500000004 HC RX 250 GENERAL PHARMACY W/ HCPCS (ALT 636 FOR OP/ED)

## 2025-01-20 PROCEDURE — 2720000007 HC OR 272 NO HCPCS: Performed by: ORTHOPAEDIC SURGERY

## 2025-01-20 PROCEDURE — 2500000004 HC RX 250 GENERAL PHARMACY W/ HCPCS (ALT 636 FOR OP/ED): Performed by: PHYSICIAN ASSISTANT

## 2025-01-20 PROCEDURE — 2500000004 HC RX 250 GENERAL PHARMACY W/ HCPCS (ALT 636 FOR OP/ED): Performed by: ANESTHESIOLOGIST ASSISTANT

## 2025-01-20 PROCEDURE — 2500000005 HC RX 250 GENERAL PHARMACY W/O HCPCS: Performed by: PHYSICIAN ASSISTANT

## 2025-01-20 PROCEDURE — A27447 PR TOTAL KNEE ARTHROPLASTY: Performed by: STUDENT IN AN ORGANIZED HEALTH CARE EDUCATION/TRAINING PROGRAM

## 2025-01-20 PROCEDURE — 2500000001 HC RX 250 WO HCPCS SELF ADMINISTERED DRUGS (ALT 637 FOR MEDICARE OP): Performed by: PHYSICIAN ASSISTANT

## 2025-01-20 PROCEDURE — C1776 JOINT DEVICE (IMPLANTABLE): HCPCS | Performed by: ORTHOPAEDIC SURGERY

## 2025-01-20 PROCEDURE — 27447 TOTAL KNEE ARTHROPLASTY: CPT | Performed by: ORTHOPAEDIC SURGERY

## 2025-01-20 PROCEDURE — 3600000010 HC OR TIME - EACH INCREMENTAL 1 MINUTE - PROCEDURE LEVEL FIVE: Performed by: ORTHOPAEDIC SURGERY

## 2025-01-20 PROCEDURE — 2780000003 HC OR 278 NO HCPCS: Performed by: ORTHOPAEDIC SURGERY

## 2025-01-20 PROCEDURE — 2500000005 HC RX 250 GENERAL PHARMACY W/O HCPCS: Performed by: ANESTHESIOLOGIST ASSISTANT

## 2025-01-20 PROCEDURE — 2500000005 HC RX 250 GENERAL PHARMACY W/O HCPCS

## 2025-01-20 PROCEDURE — A27447 PR TOTAL KNEE ARTHROPLASTY: Performed by: ANESTHESIOLOGIST ASSISTANT

## 2025-01-20 PROCEDURE — 2500000004 HC RX 250 GENERAL PHARMACY W/ HCPCS (ALT 636 FOR OP/ED): Performed by: STUDENT IN AN ORGANIZED HEALTH CARE EDUCATION/TRAINING PROGRAM

## 2025-01-20 PROCEDURE — C1713 ANCHOR/SCREW BN/BN,TIS/BN: HCPCS | Performed by: ORTHOPAEDIC SURGERY

## 2025-01-20 PROCEDURE — A4649 SURGICAL SUPPLIES: HCPCS | Performed by: ORTHOPAEDIC SURGERY

## 2025-01-20 PROCEDURE — 27447 TOTAL KNEE ARTHROPLASTY: CPT | Performed by: PHYSICIAN ASSISTANT

## 2025-01-20 PROCEDURE — 97161 PT EVAL LOW COMPLEX 20 MIN: CPT | Mod: GP

## 2025-01-20 PROCEDURE — 2500000004 HC RX 250 GENERAL PHARMACY W/ HCPCS (ALT 636 FOR OP/ED): Performed by: ORTHOPAEDIC SURGERY

## 2025-01-20 PROCEDURE — 7100000001 HC RECOVERY ROOM TIME - INITIAL BASE CHARGE: Performed by: ORTHOPAEDIC SURGERY

## 2025-01-20 PROCEDURE — 7100000009 HC PHASE TWO TIME - INITIAL BASE CHARGE: Performed by: ORTHOPAEDIC SURGERY

## 2025-01-20 PROCEDURE — 97110 THERAPEUTIC EXERCISES: CPT | Mod: GP

## 2025-01-20 PROCEDURE — 9420000001 HC RT PATIENT EDUCATION 5 MIN

## 2025-01-20 PROCEDURE — RXMED WILLOW AMBULATORY MEDICATION CHARGE

## 2025-01-20 PROCEDURE — 3700000001 HC GENERAL ANESTHESIA TIME - INITIAL BASE CHARGE: Performed by: ORTHOPAEDIC SURGERY

## 2025-01-20 PROCEDURE — 94760 N-INVAS EAR/PLS OXIMETRY 1: CPT

## 2025-01-20 PROCEDURE — 3600000005 HC OR TIME - INITIAL BASE CHARGE - PROCEDURE LEVEL FIVE: Performed by: ORTHOPAEDIC SURGERY

## 2025-01-20 PROCEDURE — 3700000002 HC GENERAL ANESTHESIA TIME - EACH INCREMENTAL 1 MINUTE: Performed by: ORTHOPAEDIC SURGERY

## 2025-01-20 PROCEDURE — 7100000002 HC RECOVERY ROOM TIME - EACH INCREMENTAL 1 MINUTE: Performed by: ORTHOPAEDIC SURGERY

## 2025-01-20 PROCEDURE — 7100000011 HC EXTENDED STAY RECOVERY HOURLY - NURSING UNIT

## 2025-01-20 PROCEDURE — 97116 GAIT TRAINING THERAPY: CPT | Mod: GP

## 2025-01-20 PROCEDURE — 64447 NJX AA&/STRD FEMORAL NRV IMG: CPT | Performed by: ANESTHESIOLOGY

## 2025-01-20 PROCEDURE — 7100000010 HC PHASE TWO TIME - EACH INCREMENTAL 1 MINUTE: Performed by: ORTHOPAEDIC SURGERY

## 2025-01-20 PROCEDURE — 97530 THERAPEUTIC ACTIVITIES: CPT | Mod: GP

## 2025-01-20 PROCEDURE — 2500000001 HC RX 250 WO HCPCS SELF ADMINISTERED DRUGS (ALT 637 FOR MEDICARE OP)

## 2025-01-20 DEVICE — TIBAL BASE ATTUNE FB, SZ 7 CEM: Type: IMPLANTABLE DEVICE | Site: KNEE | Status: FUNCTIONAL

## 2025-01-20 DEVICE — ATTUNE KNEE SYSTEM FEMORAL POSTERIOR STABILIZED SIZE 8 RIGHT CEMENTED
Type: IMPLANTABLE DEVICE | Site: KNEE | Status: FUNCTIONAL
Brand: ATTUNE

## 2025-01-20 DEVICE — ATTUNE PATELLA MEDIALIZED DOME 41MM CEMENTED AOX
Type: IMPLANTABLE DEVICE | Site: KNEE | Status: FUNCTIONAL
Brand: ATTUNE

## 2025-01-20 DEVICE — SMARTSET HV HIGH VISCOSITY BONE CEMENT 40G
Type: IMPLANTABLE DEVICE | Site: KNEE | Status: FUNCTIONAL
Brand: SMARTSET

## 2025-01-20 DEVICE — ATTUNE KNEE SYSTEM TIBIAL INSERT FIXED BEARING POSTERIOR STABILIZED 8 5MM AOX
Type: IMPLANTABLE DEVICE | Site: KNEE | Status: FUNCTIONAL
Brand: ATTUNE

## 2025-01-20 RX ORDER — FENTANYL CITRATE 50 UG/ML
50 INJECTION, SOLUTION INTRAMUSCULAR; INTRAVENOUS EVERY 5 MIN PRN
Status: DISCONTINUED | OUTPATIENT
Start: 2025-01-20 | End: 2025-01-20 | Stop reason: HOSPADM

## 2025-01-20 RX ORDER — ACETAMINOPHEN 325 MG/1
650 TABLET ORAL EVERY 6 HOURS SCHEDULED
Status: DISCONTINUED | OUTPATIENT
Start: 2025-01-20 | End: 2025-01-20 | Stop reason: HOSPADM

## 2025-01-20 RX ORDER — LIDOCAINE HYDROCHLORIDE 10 MG/ML
INJECTION, SOLUTION INTRAVENOUS AS NEEDED
Status: DISCONTINUED | OUTPATIENT
Start: 2025-01-20 | End: 2025-01-20

## 2025-01-20 RX ORDER — DOCUSATE SODIUM 100 MG/1
100 CAPSULE, LIQUID FILLED ORAL 2 TIMES DAILY
Qty: 60 CAPSULE | Refills: 0 | Status: SHIPPED | OUTPATIENT
Start: 2025-01-20 | End: 2025-02-19

## 2025-01-20 RX ORDER — PANTOPRAZOLE SODIUM 40 MG/1
40 TABLET, DELAYED RELEASE ORAL
Status: DISCONTINUED | OUTPATIENT
Start: 2025-01-21 | End: 2025-01-20 | Stop reason: HOSPADM

## 2025-01-20 RX ORDER — POLYETHYLENE GLYCOL 3350 17 G/17G
17 POWDER, FOR SOLUTION ORAL DAILY
Qty: 238 G | Refills: 0 | Status: SHIPPED | OUTPATIENT
Start: 2025-01-20

## 2025-01-20 RX ORDER — SODIUM CHLORIDE, SODIUM LACTATE, POTASSIUM CHLORIDE, CALCIUM CHLORIDE 600; 310; 30; 20 MG/100ML; MG/100ML; MG/100ML; MG/100ML
INJECTION, SOLUTION INTRAVENOUS CONTINUOUS PRN
Status: DISCONTINUED | OUTPATIENT
Start: 2025-01-20 | End: 2025-01-20

## 2025-01-20 RX ORDER — METOCLOPRAMIDE 10 MG/1
10 TABLET ORAL EVERY 6 HOURS PRN
Status: DISCONTINUED | OUTPATIENT
Start: 2025-01-20 | End: 2025-01-20 | Stop reason: HOSPADM

## 2025-01-20 RX ORDER — TRANEXAMIC ACID 100 MG/ML
INJECTION, SOLUTION INTRAVENOUS AS NEEDED
Status: DISCONTINUED | OUTPATIENT
Start: 2025-01-20 | End: 2025-01-20

## 2025-01-20 RX ORDER — BISACODYL 5 MG
10 TABLET, DELAYED RELEASE (ENTERIC COATED) ORAL DAILY PRN
Status: DISCONTINUED | OUTPATIENT
Start: 2025-01-20 | End: 2025-01-20 | Stop reason: HOSPADM

## 2025-01-20 RX ORDER — MIDAZOLAM HYDROCHLORIDE 1 MG/ML
2 INJECTION, SOLUTION INTRAMUSCULAR; INTRAVENOUS ONCE
Status: COMPLETED | OUTPATIENT
Start: 2025-01-20 | End: 2025-01-20

## 2025-01-20 RX ORDER — NALOXONE HYDROCHLORIDE 0.4 MG/ML
0.2 INJECTION, SOLUTION INTRAMUSCULAR; INTRAVENOUS; SUBCUTANEOUS EVERY 5 MIN PRN
Status: DISCONTINUED | OUTPATIENT
Start: 2025-01-20 | End: 2025-01-20 | Stop reason: HOSPADM

## 2025-01-20 RX ORDER — SCOPOLAMINE 1 MG/3D
1 PATCH, EXTENDED RELEASE TRANSDERMAL
Status: DISCONTINUED | OUTPATIENT
Start: 2025-01-20 | End: 2025-01-20 | Stop reason: HOSPADM

## 2025-01-20 RX ORDER — ASPIRIN 81 MG/1
81 TABLET ORAL 2 TIMES DAILY
Status: DISCONTINUED | OUTPATIENT
Start: 2025-01-21 | End: 2025-01-20 | Stop reason: HOSPADM

## 2025-01-20 RX ORDER — OXYCODONE HYDROCHLORIDE 5 MG/1
5 TABLET ORAL EVERY 6 HOURS PRN
Qty: 28 TABLET | Refills: 0 | Status: SHIPPED | OUTPATIENT
Start: 2025-01-20 | End: 2025-01-27

## 2025-01-20 RX ORDER — ONDANSETRON HYDROCHLORIDE 2 MG/ML
4 INJECTION, SOLUTION INTRAVENOUS EVERY 8 HOURS PRN
Status: DISCONTINUED | OUTPATIENT
Start: 2025-01-20 | End: 2025-01-20 | Stop reason: HOSPADM

## 2025-01-20 RX ORDER — ROPIVACAINE/EPI/CLONIDINE/KET 2.46-0.005
SYRINGE (ML) INJECTION AS NEEDED
Status: DISCONTINUED | OUTPATIENT
Start: 2025-01-20 | End: 2025-01-20 | Stop reason: HOSPADM

## 2025-01-20 RX ORDER — CEFAZOLIN SODIUM IN 0.9 % NACL 3 G/100 ML
3 INTRAVENOUS SOLUTION, PIGGYBACK (ML) INTRAVENOUS EVERY 8 HOURS
Status: DISCONTINUED | OUTPATIENT
Start: 2025-01-20 | End: 2025-01-20 | Stop reason: HOSPADM

## 2025-01-20 RX ORDER — HYDRALAZINE HYDROCHLORIDE 20 MG/ML
5 INJECTION INTRAMUSCULAR; INTRAVENOUS EVERY 30 MIN PRN
Status: DISCONTINUED | OUTPATIENT
Start: 2025-01-20 | End: 2025-01-20 | Stop reason: HOSPADM

## 2025-01-20 RX ORDER — SODIUM CHLORIDE, SODIUM LACTATE, POTASSIUM CHLORIDE, CALCIUM CHLORIDE 600; 310; 30; 20 MG/100ML; MG/100ML; MG/100ML; MG/100ML
100 INJECTION, SOLUTION INTRAVENOUS CONTINUOUS
Status: DISCONTINUED | OUTPATIENT
Start: 2025-01-20 | End: 2025-01-20 | Stop reason: HOSPADM

## 2025-01-20 RX ORDER — OXYCODONE HYDROCHLORIDE 5 MG/1
10 TABLET ORAL EVERY 4 HOURS PRN
Status: DISCONTINUED | OUTPATIENT
Start: 2025-01-20 | End: 2025-01-20 | Stop reason: HOSPADM

## 2025-01-20 RX ORDER — DIPHENHYDRAMINE HYDROCHLORIDE 50 MG/ML
12.5 INJECTION INTRAMUSCULAR; INTRAVENOUS ONCE AS NEEDED
Status: DISCONTINUED | OUTPATIENT
Start: 2025-01-20 | End: 2025-01-20 | Stop reason: HOSPADM

## 2025-01-20 RX ORDER — ASPIRIN 81 MG/1
81 TABLET ORAL 2 TIMES DAILY
Qty: 60 TABLET | Refills: 0 | Status: SHIPPED | OUTPATIENT
Start: 2025-01-20 | End: 2025-02-19

## 2025-01-20 RX ORDER — DOCUSATE SODIUM 100 MG/1
100 CAPSULE, LIQUID FILLED ORAL 2 TIMES DAILY
Status: DISCONTINUED | OUTPATIENT
Start: 2025-01-20 | End: 2025-01-20 | Stop reason: HOSPADM

## 2025-01-20 RX ORDER — TRAMADOL HYDROCHLORIDE 50 MG/1
50 TABLET ORAL EVERY 6 HOURS PRN
Qty: 28 TABLET | Refills: 0 | Status: SHIPPED | OUTPATIENT
Start: 2025-01-20 | End: 2025-01-27

## 2025-01-20 RX ORDER — POLYETHYLENE GLYCOL 3350 17 G/17G
17 POWDER, FOR SOLUTION ORAL DAILY
Status: DISCONTINUED | OUTPATIENT
Start: 2025-01-20 | End: 2025-01-20 | Stop reason: HOSPADM

## 2025-01-20 RX ORDER — CEFAZOLIN SODIUM IN 0.9 % NACL 3 G/100 ML
3 INTRAVENOUS SOLUTION, PIGGYBACK (ML) INTRAVENOUS ONCE
Status: COMPLETED | OUTPATIENT
Start: 2025-01-20 | End: 2025-01-20

## 2025-01-20 RX ORDER — ALBUTEROL SULFATE 0.83 MG/ML
2.5 SOLUTION RESPIRATORY (INHALATION) ONCE AS NEEDED
Status: DISCONTINUED | OUTPATIENT
Start: 2025-01-20 | End: 2025-01-20 | Stop reason: HOSPADM

## 2025-01-20 RX ORDER — OXYCODONE HYDROCHLORIDE 5 MG/1
5 TABLET ORAL EVERY 6 HOURS PRN
Status: DISCONTINUED | OUTPATIENT
Start: 2025-01-20 | End: 2025-01-20 | Stop reason: HOSPADM

## 2025-01-20 RX ORDER — ONDANSETRON HYDROCHLORIDE 2 MG/ML
INJECTION, SOLUTION INTRAVENOUS AS NEEDED
Status: DISCONTINUED | OUTPATIENT
Start: 2025-01-20 | End: 2025-01-20

## 2025-01-20 RX ORDER — FENTANYL CITRATE 50 UG/ML
25 INJECTION, SOLUTION INTRAMUSCULAR; INTRAVENOUS EVERY 5 MIN PRN
Status: DISCONTINUED | OUTPATIENT
Start: 2025-01-20 | End: 2025-01-20 | Stop reason: HOSPADM

## 2025-01-20 RX ORDER — FENTANYL CITRATE 50 UG/ML
50 INJECTION, SOLUTION INTRAMUSCULAR; INTRAVENOUS ONCE
Status: COMPLETED | OUTPATIENT
Start: 2025-01-20 | End: 2025-01-20

## 2025-01-20 RX ORDER — PROPOFOL 10 MG/ML
INJECTION, EMULSION INTRAVENOUS AS NEEDED
Status: DISCONTINUED | OUTPATIENT
Start: 2025-01-20 | End: 2025-01-20

## 2025-01-20 RX ORDER — ACETAMINOPHEN 325 MG/1
975 TABLET ORAL ONCE
Status: COMPLETED | OUTPATIENT
Start: 2025-01-20 | End: 2025-01-20

## 2025-01-20 RX ORDER — PROCHLORPERAZINE EDISYLATE 5 MG/ML
5 INJECTION INTRAMUSCULAR; INTRAVENOUS ONCE AS NEEDED
Status: DISCONTINUED | OUTPATIENT
Start: 2025-01-20 | End: 2025-01-20 | Stop reason: HOSPADM

## 2025-01-20 RX ORDER — ONDANSETRON 4 MG/1
4 TABLET, ORALLY DISINTEGRATING ORAL EVERY 8 HOURS PRN
Status: DISCONTINUED | OUTPATIENT
Start: 2025-01-20 | End: 2025-01-20 | Stop reason: HOSPADM

## 2025-01-20 RX ORDER — BISACODYL 10 MG/1
10 SUPPOSITORY RECTAL DAILY PRN
Status: DISCONTINUED | OUTPATIENT
Start: 2025-01-20 | End: 2025-01-20 | Stop reason: HOSPADM

## 2025-01-20 RX ORDER — CYCLOBENZAPRINE HCL 10 MG
5 TABLET ORAL 3 TIMES DAILY PRN
Status: DISCONTINUED | OUTPATIENT
Start: 2025-01-20 | End: 2025-01-20 | Stop reason: HOSPADM

## 2025-01-20 RX ORDER — OXYCODONE HCL 10 MG/1
10 TABLET, FILM COATED, EXTENDED RELEASE ORAL ONCE
Status: COMPLETED | OUTPATIENT
Start: 2025-01-20 | End: 2025-01-20

## 2025-01-20 RX ORDER — PANTOPRAZOLE SODIUM 40 MG/1
40 TABLET, DELAYED RELEASE ORAL DAILY
Qty: 30 TABLET | Refills: 0 | Status: SHIPPED | OUTPATIENT
Start: 2025-01-20 | End: 2025-02-19

## 2025-01-20 RX ORDER — KETOROLAC TROMETHAMINE 15 MG/ML
15 INJECTION, SOLUTION INTRAMUSCULAR; INTRAVENOUS EVERY 6 HOURS
Status: DISCONTINUED | OUTPATIENT
Start: 2025-01-20 | End: 2025-01-20 | Stop reason: HOSPADM

## 2025-01-20 RX ORDER — ACETAMINOPHEN 500 MG
1000 TABLET ORAL EVERY 6 HOURS PRN
Qty: 240 TABLET | Refills: 0 | Status: SHIPPED | OUTPATIENT
Start: 2025-01-20 | End: 2025-02-19

## 2025-01-20 RX ORDER — PREGABALIN 75 MG/1
75 CAPSULE ORAL ONCE
Status: COMPLETED | OUTPATIENT
Start: 2025-01-20 | End: 2025-01-20

## 2025-01-20 RX ORDER — IPRATROPIUM BROMIDE 0.5 MG/2.5ML
500 SOLUTION RESPIRATORY (INHALATION) AS NEEDED
Status: DISCONTINUED | OUTPATIENT
Start: 2025-01-20 | End: 2025-01-20 | Stop reason: HOSPADM

## 2025-01-20 RX ORDER — MEPERIDINE HYDROCHLORIDE 25 MG/ML
12.5 INJECTION INTRAMUSCULAR; INTRAVENOUS; SUBCUTANEOUS EVERY 10 MIN PRN
Status: DISCONTINUED | OUTPATIENT
Start: 2025-01-20 | End: 2025-01-20 | Stop reason: HOSPADM

## 2025-01-20 RX ORDER — LABETALOL HYDROCHLORIDE 5 MG/ML
5 INJECTION, SOLUTION INTRAVENOUS ONCE AS NEEDED
Status: DISCONTINUED | OUTPATIENT
Start: 2025-01-20 | End: 2025-01-20 | Stop reason: HOSPADM

## 2025-01-20 RX ORDER — MELOXICAM 7.5 MG/1
7.5 TABLET ORAL ONCE
Status: COMPLETED | OUTPATIENT
Start: 2025-01-20 | End: 2025-01-20

## 2025-01-20 RX ORDER — MELOXICAM 15 MG/1
15 TABLET ORAL DAILY
Qty: 30 TABLET | Refills: 0 | Status: SHIPPED | OUTPATIENT
Start: 2025-01-20 | End: 2025-02-19

## 2025-01-20 RX ORDER — ONDANSETRON HYDROCHLORIDE 2 MG/ML
4 INJECTION, SOLUTION INTRAVENOUS ONCE AS NEEDED
Status: COMPLETED | OUTPATIENT
Start: 2025-01-20 | End: 2025-01-20

## 2025-01-20 RX ORDER — METOCLOPRAMIDE HYDROCHLORIDE 5 MG/ML
10 INJECTION INTRAMUSCULAR; INTRAVENOUS EVERY 6 HOURS PRN
Status: DISCONTINUED | OUTPATIENT
Start: 2025-01-20 | End: 2025-01-20 | Stop reason: HOSPADM

## 2025-01-20 RX ADMIN — PREGABALIN 75 MG: 75 CAPSULE ORAL at 06:14

## 2025-01-20 RX ADMIN — POVIDONE-IODINE 1 APPLICATION: 5 SOLUTION TOPICAL at 06:14

## 2025-01-20 RX ADMIN — LIDOCAINE HYDROCHLORIDE 40 MG: 10 INJECTION, SOLUTION INTRAVENOUS at 07:12

## 2025-01-20 RX ADMIN — Medication 21 PERCENT: at 10:34

## 2025-01-20 RX ADMIN — KETOROLAC TROMETHAMINE 15 MG: 15 INJECTION, SOLUTION INTRAMUSCULAR; INTRAVENOUS at 13:17

## 2025-01-20 RX ADMIN — ONDANSETRON 4 MG: 2 INJECTION, SOLUTION INTRAMUSCULAR; INTRAVENOUS at 08:31

## 2025-01-20 RX ADMIN — OXYCODONE HYDROCHLORIDE 10 MG: 10 TABLET, FILM COATED, EXTENDED RELEASE ORAL at 06:25

## 2025-01-20 RX ADMIN — DEXAMETHASONE SODIUM PHOSPHATE 8 MG: 4 INJECTION, SOLUTION INTRAMUSCULAR; INTRAVENOUS at 07:26

## 2025-01-20 RX ADMIN — TRANEXAMIC ACID 1000 MG: 100 INJECTION, SOLUTION INTRAVENOUS at 08:24

## 2025-01-20 RX ADMIN — PROPOFOL 75 MCG/KG/MIN: 10 INJECTION, EMULSION INTRAVENOUS at 07:13

## 2025-01-20 RX ADMIN — MIDAZOLAM 2 MG: 1 INJECTION INTRAMUSCULAR; INTRAVENOUS at 06:44

## 2025-01-20 RX ADMIN — FENTANYL CITRATE 50 MCG: 50 INJECTION INTRAMUSCULAR; INTRAVENOUS at 06:44

## 2025-01-20 RX ADMIN — PROPOFOL 40 MG: 10 INJECTION, EMULSION INTRAVENOUS at 07:12

## 2025-01-20 RX ADMIN — PROPOFOL 40 MG: 10 INJECTION, EMULSION INTRAVENOUS at 07:24

## 2025-01-20 RX ADMIN — ACETAMINOPHEN 650 MG: 325 TABLET, FILM COATED ORAL at 11:59

## 2025-01-20 RX ADMIN — TRANEXAMIC ACID 1000 MG: 100 INJECTION, SOLUTION INTRAVENOUS at 07:17

## 2025-01-20 RX ADMIN — OXYCODONE 5 MG: 5 TABLET ORAL at 11:59

## 2025-01-20 RX ADMIN — ACETAMINOPHEN 975 MG: 325 TABLET, FILM COATED ORAL at 06:14

## 2025-01-20 RX ADMIN — SODIUM CHLORIDE, POTASSIUM CHLORIDE, SODIUM LACTATE AND CALCIUM CHLORIDE: 600; 310; 30; 20 INJECTION, SOLUTION INTRAVENOUS at 08:48

## 2025-01-20 RX ADMIN — MELOXICAM 7.5 MG: 7.5 TABLET ORAL at 06:14

## 2025-01-20 RX ADMIN — ONDANSETRON 4 MG: 2 INJECTION, SOLUTION INTRAMUSCULAR; INTRAVENOUS at 09:44

## 2025-01-20 RX ADMIN — PROPOFOL 11.85 MG: 10 INJECTION, EMULSION INTRAVENOUS at 08:46

## 2025-01-20 RX ADMIN — SODIUM CHLORIDE, POTASSIUM CHLORIDE, SODIUM LACTATE AND CALCIUM CHLORIDE: 600; 310; 30; 20 INJECTION, SOLUTION INTRAVENOUS at 06:56

## 2025-01-20 RX ADMIN — MEPIVACAINE HYDROCHLORIDE 3.5 ML: 15 INJECTION, SOLUTION EPIDURAL; INFILTRATION at 07:09

## 2025-01-20 RX ADMIN — SCOPOLAMINE 1 PATCH: 1.5 PATCH, EXTENDED RELEASE TRANSDERMAL at 06:15

## 2025-01-20 RX ADMIN — Medication 2 G: at 07:11

## 2025-01-20 ASSESSMENT — PAIN - FUNCTIONAL ASSESSMENT
PAIN_FUNCTIONAL_ASSESSMENT: WONG-BAKER FACES
PAIN_FUNCTIONAL_ASSESSMENT: WONG-BAKER FACES
PAIN_FUNCTIONAL_ASSESSMENT: 0-10
PAIN_FUNCTIONAL_ASSESSMENT: WONG-BAKER FACES
PAIN_FUNCTIONAL_ASSESSMENT: WONG-BAKER FACES
PAIN_FUNCTIONAL_ASSESSMENT: 0-10
PAIN_FUNCTIONAL_ASSESSMENT: 0-10

## 2025-01-20 ASSESSMENT — PAIN SCALES - GENERAL
PAINLEVEL_OUTOF10: 0 - NO PAIN
PAINLEVEL_OUTOF10: 0 - NO PAIN
PAINLEVEL_OUTOF10: 5 - MODERATE PAIN
PAINLEVEL_OUTOF10: 3
PAINLEVEL_OUTOF10: 5 - MODERATE PAIN
PAINLEVEL_OUTOF10: 0 - NO PAIN
PAINLEVEL_OUTOF10: 4
PAINLEVEL_OUTOF10: 0 - NO PAIN
PAINLEVEL_OUTOF10: 0 - NO PAIN

## 2025-01-20 ASSESSMENT — ACTIVITIES OF DAILY LIVING (ADL)
LACK_OF_TRANSPORTATION: NO
ADL_ASSISTANCE: INDEPENDENT

## 2025-01-20 ASSESSMENT — COLUMBIA-SUICIDE SEVERITY RATING SCALE - C-SSRS
1. IN THE PAST MONTH, HAVE YOU WISHED YOU WERE DEAD OR WISHED YOU COULD GO TO SLEEP AND NOT WAKE UP?: NO
2. HAVE YOU ACTUALLY HAD ANY THOUGHTS OF KILLING YOURSELF?: NO
2. HAVE YOU ACTUALLY HAD ANY THOUGHTS OF KILLING YOURSELF?: NO
6. HAVE YOU EVER DONE ANYTHING, STARTED TO DO ANYTHING, OR PREPARED TO DO ANYTHING TO END YOUR LIFE?: NO
6. HAVE YOU EVER DONE ANYTHING, STARTED TO DO ANYTHING, OR PREPARED TO DO ANYTHING TO END YOUR LIFE?: NO
1. IN THE PAST MONTH, HAVE YOU WISHED YOU WERE DEAD OR WISHED YOU COULD GO TO SLEEP AND NOT WAKE UP?: NO

## 2025-01-20 ASSESSMENT — COGNITIVE AND FUNCTIONAL STATUS - GENERAL
CLIMB 3 TO 5 STEPS WITH RAILING: A LITTLE
MOBILITY SCORE: 23

## 2025-01-20 ASSESSMENT — PAIN DESCRIPTION - DESCRIPTORS
DESCRIPTORS: DULL;ACHING
DESCRIPTORS: BURNING

## 2025-01-20 ASSESSMENT — PAIN DESCRIPTION - LOCATION: LOCATION: KNEE

## 2025-01-20 NOTE — ANESTHESIA POSTPROCEDURE EVALUATION
"Patient: Abhishek Olson \"Jaron\"    Procedure Summary       Date: 01/20/25 Room / Location: [facility] OR 07 / Virtual [facility] OR    Anesthesia Start: 0656 Anesthesia Stop: 0858    Procedure: Knee Replacement Total Cement Unilat *Rapid Recovery* (Right: Knee) Diagnosis:       Unilateral primary osteoarthritis, right knee      (Unilateral primary osteoarthritis, right knee [M17.11])    Surgeons: Jorge Dickey MD Responsible Provider: Douglas Duke MD    Anesthesia Type: regional, MAC, spinal ASA Status: 2            Anesthesia Type: regional, MAC, spinal    Vitals Value Taken Time   /96 01/20/25 1010   Temp 36.2 °C (97.2 °F) 01/20/25 1010   Pulse 80 01/20/25 1010   Resp 16 01/20/25 1010   SpO2 93 % 01/20/25 1034       Anesthesia Post Evaluation    Patient location during evaluation: PACU  Patient participation: complete - patient participated  Level of consciousness: awake  Pain management: adequate  Multimodal analgesia pain management approach  Airway patency: patent  Cardiovascular status: acceptable and hemodynamically stable  Respiratory status: acceptable and spontaneous ventilation  Hydration status: euvolemic  Postoperative Nausea and Vomiting: none  Comments: No nausea or vomiting        There were no known notable events for this encounter.    "

## 2025-01-20 NOTE — ANESTHESIA PROCEDURE NOTES
Spinal Block    Patient location during procedure: OR  Start time: 1/20/2025 6:58 AM  End time: 1/20/2025 7:09 AM  Reason for block: primary anesthetic and at surgeon's request  Staffing  Performed: ROSA and TUNG   Authorized by: Douglas Duke MD    Performed by: ROSA Jackson    Preanesthetic Checklist  Completed: patient identified, IV checked, site marked, risks and benefits discussed, surgical consent, monitors and equipment checked, pre-op evaluation, timeout performed and sterile techniques followed  Block Timeout  RN/Licensed healthcare professional reads aloud to the Anesthesia provider and entire team: Patient identity, procedure with side and site, patient position, and as applicable the availability of implants/special equipment/special requirements.  Patient on coagulant treatment: no  Timeout performed at: 1/20/2025 7:02 AM  Spinal Block  Patient position: sitting  Prep: Betadine  Sterility prep: cap, drape, gloves, hand hygiene and mask  Sedation level: light sedation  Patient monitoring: blood pressure and continuous pulse oximetry  Approach: midline  Vertebral space: L3-4  Injection technique: single-shot  Needle  Needle type: Quincke   Needle gauge: 25 G  Needle length: 4 in  Free flowing CSF: yes    Assessment  Sensory level: T6 bilateral  Procedure assessment: patient sedated but conversant throughout procedure and patient tolerated procedure well with no immediate complications  Additional Notes  3.5 mL spinal Mepivacaine PSF  1% lidocaine local

## 2025-01-20 NOTE — ANESTHESIA PROCEDURE NOTES
Peripheral Block    Patient location during procedure: pre-op  Start time: 1/20/2025 6:45 AM  End time: 1/20/2025 6:48 AM  Reason for block: at surgeon's request and post-op pain management  Staffing  Performed: attending   Authorized by: Douglas Duke MD    Performed by: Chandler Barr MD  Preanesthetic Checklist  Completed: patient identified, IV checked, site marked, risks and benefits discussed, surgical consent, monitors and equipment checked, pre-op evaluation and timeout performed   Timeout performed at: 1/20/2025 6:42 AM  Peripheral Block  Patient position: laying flat  Prep: ChloraPrep  Patient monitoring: heart rate, cardiac monitor and continuous pulse ox  Block type: adductor canal  Laterality: right  Injection technique: single-shot  Guidance: ultrasound guided  Local infiltration: ropivacaine  Infiltration strength: 0.5 %  Dose: 20 mL  Needle  Needle type: short-bevel   Needle gauge: 21 G  Needle length: 10 cm  Needle localization: ultrasound guidance  Assessment  Injection assessment: negative aspiration for heme, no paresthesia on injection, incremental injection and local visualized surrounding nerve on ultrasound  Paresthesia pain: none  Heart rate change: no  Slow fractionated injection: yes

## 2025-01-20 NOTE — ANESTHESIA PREPROCEDURE EVALUATION
"Patient: Abhishek Olson \"Jaron\"    Procedure Information       Date/Time: 01/20/25 0606    Procedure: Knee Replacement Total Cement Unilat *Rapid Recovery* (Right: Knee) - DePuy Attune Knee, Pineapple [place]    Location: [facility] / [facility]    Surgeons: Jorge Dickey MD            Relevant Problems   Anesthesia (within normal limits)      Cardiac   (+) Hypertension, essential      Pulmonary   (+) GABRIELA (obstructive sleep apnea)      Neuro   (+) Anxiety      GI (within normal limits)      /Renal (within normal limits)      Liver (within normal limits)      Endocrine (within normal limits)      Hematology (within normal limits)      Musculoskeletal   (+) Unilateral primary osteoarthritis, right knee      HEENT (within normal limits)      ID (within normal limits)      Skin (within normal limits)      GYN (within normal limits)       Clinical information reviewed:   Tobacco  Allergies  Meds   Med Hx  Surg Hx   Fam Hx  Soc Hx        NPO Detail:  No data recorded     Physical Exam    Airway  Mallampati: II  TM distance: >3 FB  Neck ROM: full     Cardiovascular    Dental    Pulmonary    Abdominal            Anesthesia Plan    History of general anesthesia?: yes  History of complications of general anesthesia?: no    ASA 2     regional, MAC and spinal     intravenous induction   Postoperative administration of opioids is intended.  Anesthetic plan and risks discussed with patient.  Use of blood products discussed with patient who consented to blood products.      "

## 2025-01-20 NOTE — CARE PLAN
Patient has GABRIELA; brought his home Cpap machine; does not wear at home consistently per wife; anticipating going home today;  Incentive spirometry achieved 2500 ml + x 6 slow deep breaths; BS clear throughout; SpO2 93% hr 72 on Room Air.

## 2025-01-20 NOTE — HH CARE COORDINATION
.[facility] received a Referral for Physical Therapy. We have processed the referral for a Start of Care on 01.21.25.     If you have any questions or concerns, please feel free to contact us at [phone]. Follow the prompts, enter your five digit zip code, and you will be directed to your care team on WEST 2.

## 2025-01-20 NOTE — PROGRESS NOTES
01/20/25 1046   Discharge Planning   Living Arrangements Spouse/significant other   Support Systems Spouse/significant other   Assistance Needed home with 24/7 assist for 2-3 days  (Pt has ice machine.  RN notified DME team that pt needs walker and hip kit.)   Type of Residence Private residence   Number of Stairs to Enter Residence 2   Number of Stairs Within Residence 15   Who is requesting discharge planning? Provider   Home or Post Acute Services In home services   Type of Home Care Services Home PT   Expected Discharge Disposition Home H   Does the patient need discharge transport arranged? No   Financial Resource Strain   How hard is it for you to pay for the very basics like food, housing, medical care, and heating? Not hard   Housing Stability   In the last 12 months, was there a time when you were not able to pay the mortgage or rent on time? N   At any time in the past 12 months, were you homeless or living in a shelter (including now)? N   Transportation Needs   In the past 12 months, has lack of transportation kept you from medical appointments or from getting medications? no   In the past 12 months, has lack of transportation kept you from meetings, work, or from getting things needed for daily living? No   Patient Choice   Provider Choice list and CMS website (https://medicare.gov/care-compare#search) for post-acute Quality and Resource Measure Data were provided and reviewed with: Patient   Patient / Family choosing to utilize agency / facility established prior to hospitalization No     DC plan is [facility].  Referral confirmed and SOC confirmed for 1/21.

## 2025-01-20 NOTE — DISCHARGE INSTRUCTIONS
MD Tanja Riojas MPAS, BERNY, ATC  Adult Reconstruction and Joint Replacement Surgery  Phone: [phone]     Fax: [phone]        DISCHARGE INSTRUCTIONS      PLEASE READ CAREFULLY BEFORE CONTACTING YOUR PROVIDER.    WE WORK COLLABORATIVELY AS A TEAM. CALLING MULTIPLE STAFF MEMBERS REGARDING THE SAME ISSUE WILL DELAY YOUR CARE.    [employer] IS THE PREFERRED COMMUNICATION FOR ALL TEAM MEMBERS.    POSTOPERATIVE INSTRUCTIONS: TOTAL HIP & TOTAL KNEE ARTHROPLASTY    JOINT CARE TEAM  Please use the information below to contact your care team following surgery.  If you are leaving a message or using the [employer] Chart portal, please include your full name, date of birth and date of surgery so that we can correctly identify you.  Your call will be returned within 1-2 business days, please do not leave multiple messages with other staff regarding a single issue while you are awaiting a return call.     Who to call Contact Information Matters needing handled   Tanja Parry PA-C  Physician Assistant [employer] [facility]   Medical questions/concerns       Stephane Zimmerman-    [email or web address]           [phone]  opt. 2    [phone] fax  [phone]         Scheduling office Visits  Medical questions/concerns  Leave of Absence or other paperwork  Any concerns more than 6 weeks from surgery - an appointment will need to be made   Emelia Danielson MBA, BSN, RN-BC  Ortho Nurse Navigator [place]        __________________________________    Eusebio Alvarez RN, BSN  Ortho Coordinator [facility] CRUZN-BC  Ortho Nurse Navigator [facility]       [phone] Please call staff at the institution in which you had surgery for prescription refills        Prescription Refills  Nursing, medical question related questions or concerns within 6 weeks of surgery   Orders for Outpatient Physical Therapy             MEDICATION  REFILLS - MyChart or Nurse Navigator (Above) at the institution in which you had surgery. [facility] [place] or [facility].    -You will NOT receive a call indicating that your prescription has been filled.  Please contact your pharmacy with any questions.    Medication refills will be filled Monday-Friday 7am to 1pm ONLY. Please call the nurse navigator office or send a [employer] message for a refill request.  Any requests received outside of this timeframe will be handled on the next business day.  Please do not call multiple times or call other members of the care team for medication needs, this will cause the refill to take longer.    Per State and Institutional policy, pain medications can only be refilled every 7 days for up to six weeks following surgery.    My Chart Portal: If you are using the My Chart portal and are requesting a medication refill, please list what type of surgery you had and left or right side, medication that needs refilled, and pharmacy you would like your medication sent.     WEIGHT BEARING- weight bearing as tolerated to operative extremity     ACTIVITY-As Tolerated    DRIVING & TRAVEL AFTER SURGERY   Patients should anticipate waiting at least 4-6 weeks before traveling long distances after surgery.  You will need to stop to walk around ever 1 hour during your travel to help with blood clot prevention.    Patients may not drive until cleared by the joint nurse or the office and you are off of all narcotics.    DENTAL PROCEDURES & CLEANINGS  You must wait a minimum of 3 months for elective dental appointments after a total joint replacement, including routine cleanings or dental work including bridges, crowns, extractions, etc.. Unless, it is an emergency. You will need a prophylactic antibiotic lifelong prior to any dental visit, cleaning or procedure. Your surgeon's office or your dentist may provide a prescription antibiotic. Antibiotics are a lifelong need before dental appointments.      You  do not need antibiotics for endoscopic procedures such as colonoscopy or EGD, dermatologic biopsies or eye surgeries.    WOUND CARE  If you experience continued drainage or bleeding, you may cover with abdominal/Maxi pads (purchase at local drug store).  Knee replacements should wrap with an ace wrap.  You may shower with waterproof dressing on. Your surgical bandage will be removed by your home therapist 1 week after surgery. If you have staples intact, home care will remove in 2 weeks. If you have sutures intact, you will need to return to the office in 2 weeks for suture removal. Once the dressing is removed by home care, you may continue to shower. Let soap and water wash over the wound. DO NOT SCRUB.  Steri-strips under the bandage will remain in place until they fall off on their own.  If they are loose, you may gently remove.  If they have not fallen off in two weeks, gently peel them off. Do not remove if pulling causes resistance against the incision.  You will see suture tails sticking out of the ends of the incision.  DO NOT CUT THEM.  They will fall off when the sutures dissolve.  If they are bothersome, cover with a band aid.  Do not soak in a bath tub, hot tub, pool or lake until you are 8 weeks out from surgery.  Do not apply lotions, creams or ointments until you are 6 weeks out from surgery,    PAIN, SWELLING, BRUISING & CLICKING  Pain and swelling are a natural part of your recovery which is considered normal for up to a year after surgery.  Symptoms may be treated with movement, ice, compression stockings, elevating your leg, and by following the pain medication regimen as prescribed.  Bruising is normal for several weeks after surgery. You may also have leg swelling and pain in your shin.  You may ice areas that are tender to help with discomfort.  You are required to wear the provided compression stockings, every day, for 4 weeks following surgery.  Remove the stockings at night and place them  back on in the morning.  Pain and swelling may temporarily increase with an increase in activity or exercise.  Use ice after activity.  Audible clicking with movement or exercises is considered normal following joint replacement.  If this persists at 6 months or 1 year, please notify your surgeon.  You may also feel decreased sensation or numbness near the incision site.  This is normal and sensation may or may not return.    PERSONAL HYGIENE  You may shower upon discharging from the hospital.  Soap and water is permitted to run over the surgical dressing, steri-strips and incision.  Do not scrub directly over these items.  DO NOT soak your incision in a bath, hot tub, pool or pond/lake for a minimum of 8 weeks following your surgery.  DO NOT use lotions, creams, ointments on your wound for a minimum of 6 weeks following your surgery. At that time you may use vitamin E to assist with softening of your incision.      RESTARTING HOME ROUTINE - DIET & MEDICATIONS  Post-operative constipation can result due to a combination of inactivity, anesthesia and pain medication. To help prevent this, you should increase your water and fiber intake. Physical activity such as walking will also help stimulate the bowels.   You may resume your normal diet when you discharge home.    To avoid constipation, choose foods that help promote good bowel habits, such as foods high in fiber.  You may restart your home medications the following day after your surgery UNLESS you have been given alternate instructions.  Follow the instructions given to you on your hospital discharge instructions for more information regarding your home medications.  IF YOU EXPERIENCE NAUSEA OR DIARRHEA, FOLLOW THE B.R.A.T. DIET UNTIL SYMPTOMS RESOLVE.  If you are experiencing vomiting that lasts more than 24 hours, please contact your joint nurse.      IN-HOME PHYSICAL THERAPY & OUTPATIENT PHYSICAL THERAPY  In-home physical therapy will start 1-2 days after you  get home from the hospital.    The home care agency will call within the first 24-48 hours to set up their first visit.  Please do not call your care team to inquire during this timeframe.  Continue the exercises you were given in the hospital until you have been seen by in-home therapy.  Make sure to provide a phone number with the ability for the home care staff to leave a message if you do not answer your phone.    Outpatient physical therapy following knee replacement surgery should begin 2-3 weeks after surgery.  You will be given physical therapy order prior to discharge from the hospital. You should call to schedule this appointment ASAP if not already scheduled before surgery.  Waiting until you are ready for outpatient physical therapy will cause a delay in your care.  You may choose any outpatient physical therapy location.      EMERGENCIES - WHEN TO CONTACT THE SURGEON'S OFFICE IMMEDIATELY  Fever >101 with chills that has been present for at least 48 hours.   Excessive bleeding from incision that will not slow down. A small amount of drainage is normal and expected.  Once pressure is applied and the area is covered, do not continue to check the area regularly.  This will remove pressure and bleeding will continue.  Leave in place for 4-6 hours.  Signs of infection of incision-excessive drainage that is soaking through your dressing (especially if it is pus-like), redness that is spreading out from the edges of your incision, or increased warmth around the area.  Excruciating pain for which the pain medication, taken as instructed, is not helping.  Severe calf pain.  Go directly to the emergency room or call 911, if you are experiencing chest pain or difficulty breathing.    After Hour and Weekend Emergency Answering Service [phone]    ICE & COLD THERAPY INSTRUCTIONS    To assist with pain control and post-op swelling, you should be using ice regularly throughout recovery, especially for the first 6  weeks, regardless of the cold therapy method you use.      Always make sure there is a layer of protection between the cold pad and your skin.    If you are using ICE PACKS or GEL PACKS, you will need to alternate 20 minutes on, 20 minutes off twice per hour.    If you are using an ICE MACHINE, please follow the provided ice machine instructions.  These devices differ from ice or ice packs whereas the mechanism circulates water through tubing and a pad to provide longer periods of cold therapy to the desired site.  You can use your cold devices around the clock for optimal comfort.  We recommend using cold therapy after working with therapy or completing exercises on your own.  There is no set schedule in which you must follow while using cold therapy.  Below are a few points to remember when using a cold therapy device:    You do not need to need to use the 20 on, 20 off method.  Detach the pad from the cooler and ambulate at least once every hour.  You can check your skin under the pad at this time.  You may wear the cold therapy device during periods of sleep including overnight.  If you wake up during the night, you can check the skin at this time.  You do not need to wake up specifically to perform skin checks.  Empty the cooler and pad when device is not in use.  Follow 's instructions for cleaning your cold therapy device.    DISCHARGE MEDICATIONS - Please reference the sample schedule on the reverse side for instructions on how to best schedule medications.    PAIN MEDICATION    ___X_ Tramadol / Oxycodone  Tramadol and Oxycodone have been prescribed for post-operative pain control.    These medications will only be refilled ONCE every 7 days for a period of up to 6 weeks following surgery.  After 6 weeks, you will transition to acetaminophen and over -the- counter anti-inflammatories such as Ibuprofen, Advil or Aleve in conjunction with ICE/COLD THERAPY.   Side effects may be constipation and  nausea, vomiting, sleepiness, dizziness, lightheadedness, headache, blurred vision, dry mouth sweating, itching (if you have itching, over-the -counter Benadryl can be used as needed).  You may NOT operate a motor vehicle while taking these medications or have been cleared by your care team.     ___X_ Acetaminophen (Tylenol)  Acetaminophen has been prescribed as an adjunct for pain control. Take two 500 mg tablets every 6 hours for 4 weeks. You will not receive a refill on this medication.  Do not exceed 4000mg of acetaminophen within a 24 hour period.  Side effects may include nausea, heartburn, drowsiness, and headache.    ___X_ Meloxicam (Mobic)-Meloxicam has been prescribed as an adjunct anti-inflammatory to assist in pain control.    Take one 15mg tablet once daily for 4 weeks.  You will not receive refills on this medication.   Side effects may include nausea.  May not be prescribed if you are on a more potent blood thinner than aspirin or have chronic kidney disease.    BLOOD THINNER    ___X_ Blood Thinner   A blood thinner has been prescribed to prevent blood clots in your leg or lungs. Take as prescribed on the bottle for 4 weeks. You will not receive a refill on this medication.    ANTI NAUSEA    ___X_ Proton Pump Inhibitor (PPI)-Stomach Acid Reduction Medication  If you are already on a PPI, you will continue your regular medication. If you are not, you will be prescribed Pantoprazole to help with nausea and protect your stomach while taking pain medication.  You will not receive a refill on this medication.    STOOL SOFTENERS    ___X_ Colace (Docusate Sodium) & Miralax (polyethylene glycol)  Take both medications to help with constipation while using the Oxycodone and Tramadol for pain control.  You will not receive a refill on this medication.    Continued Constipation  If you continue to be constipated despite daily use of Miralax and Colace, you try an over-the-counter Dulcolax Suppository and use per  instructions on the package.     You will not receive refills on the following medications.   Acetaminophen (Tylenol  Meloxicam  Miralax  Colace  Proton Pump Inhibitor (PPI)  Blood Thinner    Pain Medication Refills - Ortho Nurse Navigator or [facility]- Monday through Friday 7am-1pm    FOLLOW-UP- You should have an appointment with either Dr. Dickey or MISSY Jennings in 6 weeks.         SAMPLE              The times below are an example of how to organize medications to optimize pain control  Your actual medication schedule may vary based on your last dose taken IN THE HOSPITAL      Time 3:00 am 6:00 am 9:00 am 12:00 pm 3:00 pm 6:00 pm 9:00 pm 12:00 am   Medications Tramadol Tylenol  Oxycodone  Miralax   Blood Thinner  Colace  Pantoprazole or other PPI  Tramadol  Meloxicam Tylenol  Oxycodone Tramadol Tylenol  Oxycodone  Miralax Blood Thinner  Colace  Tramadol   Tylenol  Oxycodone            You may begin to wean off the pain medication as your pain remains controlled with increased activity.  The schedules provided are meant to serve as an example.  You may wean off based on your pain control.  Please note that pain medications are not filled beyond 6 weeks after surgery.              The times below are an example of how to WEAN OFF medications WHILE CONTINUING TO OPTIMIZE PAIN CONTROL.  Your actual medication schedule may vary based on your last dose taken.    Time 12:00am 4:00am 8:00am 12:00pm 4:00pm 8:00pm   Med Tramadol Oxycodone   Tramadol Oxycodone Tramadol Oxycodone     Time 12:00am 6:00am 12:00pm 6:00pm   Med Tramadol Oxycodone   Tramadol Oxycodone     Time 12:00am 8:00am 4:00pm   Med Tramadol Oxycodone   Tramadol     Time 12:00am 12:00pm   Med Tramadol Tramadol         TOTAL KNEE REPLACEMENT PATIENTS SHOULD TRANSITION TO OUTPATIENT PHYSICAL THERAPY NO MORE THAN 3 WEEKS FOLLOWING SURGERY.  PLEASE SEE THE LIST OF  FACILITIES BELOW.  CALL TO SCHEDULE YOUR FIRST APPOINTMENT BEFORE YOU HAVE YOUR  SURGERY.
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
## 2025-01-20 NOTE — NURSING NOTE
Patient to room 23 for RR. Team notified of patient arrival.      Bedside handoff report done in PACU. Patient transported via cart to room.      Plan of day discussed with patient and family; all questions answered at this time. Patient and family verbalized understanding that patient is not to ambulate without RN at bedside. Bed locked and lowered, call light in reach. Patient safety maintained.     Orders reviewed and released.

## 2025-01-20 NOTE — OP NOTE
"Knee Replacement Total Cement Unilat *Rapid Recovery* (R) Operative Note     Date: 2025  OR Location: [facility] OR    Name: Abhishek Olson \"Nir", : 1960, Age: 64 y.o., MRN: [id number], Sex: male    Diagnosis  Pre-op Diagnosis      * Unilateral primary osteoarthritis, right knee [M17.11] Post-op Diagnosis     * Unilateral primary osteoarthritis, right knee [M17.11]     Procedures  Knee Replacement Total Cement Unilat *Rapid Recovery*  31561 - MI ARTHRP KNE CONDYLE&PLATU MEDIAL&LAT COMPARTMENTS      Surgeons      * Jorge Dickey - Primary    Resident/Fellow/Other Assistant:  Surgeons and Role:     * Tanja Parry PA-C - [facility] Assist    Staff:   Circulator: Mahnaz Zavala Person: Marie Middletonub Person: Cheryle Zavala Person: Sabi    Anesthesia Staff: Anesthesiologist: Douglas Duke MD  C-AA: ROSA Jackson  GIL: Adis Mujica    Procedure Summary  Anesthesia: Regional, Monitor Anesthesia Care, Spinal  ASA: II  Estimated Blood Loss: 25mL  Intra-op Medications:   Administrations occurring from 0650 to 0925 on 25:   Medication Name Total Dose   ropivacaine-epinephrine-clonidine-ketorolac 2.46-0.005- 0.0008-0.3mg/mL periarticular syringe 50 mL   dexAMETHasone (Decadron) injection 4 mg/mL 8 mg   LR infusion Cannot be calculated   lidocaine PF (cardiac) syringe 1% 40 mg   mepivacaine (Carbocaine) injection 1.5 % 3.5 mL   propofol (Diprivan) injection 10 mg/mL 1,351.42 mg   tranexamic acid injection 100 mg/mL 1,000 mg   ceFAZolin (Ancef) 3 g in sodium chloride 0.9%  mL 2 g              Anesthesia Record               Intraprocedure I/O Totals          Intake    Tranexamic Acid 0.00 mL    The total shown is the total volume documented since Anesthesia Start was filed.    Total Intake 0 mL       Output    Est. Blood Loss 25 mL    Total Output 25 mL       Net    Net Volume -25 mL          Specimen: No specimens collected              Drains and/or Catheters:   Closed/Suction Drain " "Right;Anterior Knee Accordion 15 Fr. (Active)       Tourniquet Times:   * Missing tourniquet times found for documented tourniquets in lo *     Implants:  Implants       Type Name Action Serial No.      Joint Knee BONE CEMENT, SMART SET, HIGH VISCOSITY, 40GM - [id number] Implanted      Joint Knee BONE CEMENT, SMART SET, HIGH VISCOSITY, 40GM - [id number] Implanted      Joint Knee FEMORAL, ATTUNE PS, DANDY, SZ 8, RT - [id number] Implanted      Joint Knee TIBAL BASE ATTUNE FB, SZ 7 DANDY - [id number] Implanted      Joint Knee INSERT, ATTUNE PS FB, SZ 8, 5MM - [id number] Implanted      Joint Knee DOME, PATELLA, MEDIALIZED, 41MM - [id number] Implanted               Findings: advanced OA    Indications: Abhishek Oglesby"Nir" is an 64 y.o. male who is having surgery for Unilateral primary osteoarthritis, right knee [M17.11].     The patient was seen in the preoperative area. The risks, benefits, complications, treatment options, non-operative alternatives, expected recovery and outcomes were discussed with the patient. The possibilities of reaction to medication, pulmonary aspiration, injury to surrounding structures, bleeding, recurrent infection, the need for additional procedures, failure to diagnose a condition, and creating a complication requiring transfusion or operation were discussed with the patient. The patient concurred with the proposed plan, giving informed consent.  The site of surgery was properly noted/marked if necessary per policy. The patient has been actively warmed in preoperative area. Preoperative antibiotics have been ordered and given within 1 hours of incision. Venous thrombosis prophylaxis have been ordered including bilateral sequential compression devices    Procedure Details:   R TKA  Complications:  None; patient tolerated the procedure well.    Disposition: PACU - hemodynamically stable.  Condition: stable     PREOPERATIVE DIAGNOSIS:  right knee osteoarthritis     POSTOPERATIVE " DIAGNOSIS:  right knee osteoarthritis     OPERATION/PROCEDURE:  right total knee arthroplasty     SURGEON:  Jorge Dickey MD     ASSISTANT(S):  HERMELINDO Jennings    The patient's surgery was assisted by MISSY Jennings, due to lack of availability of a qualified resident to assist with the surgery.  Tanja Parry was present for the essential parts of the procedure.  She acted as first assistant and assisted with preparing the leg, draping the leg, exposing the knee, retracting and manipulating the leg during surgery, facilitating safe performance of the procedure, and closure of the wound.     ANESTHESIA:  spinal     LOCATION:  [facility]     ESTIMATED BLOOD LOSS AND INTRAVENOUS FLUIDS:  Please see Anesthesia record     COMPONENTS USED:  [employer] knee system  1. 8 femur  2. 7 tibia  3. 41 patella  4. 5mm insert     BRIEF CLINICAL NOTE:  The patient is a 65 yo male with advanced osteoarthritis of  their right knee.  They failed conservative treatment and wished to  proceed with total knee arthroplasty which is indicated at this time.  We discussed the risks, benefits, and alternatives of surgery  including but not limited to infection, damage to vessel or nerve,  bleeding, soft tissue pain, DVT, PE, problems with anesthesia, lack  of range of motion, continued soft tissue pain, need for further  surgery, etc.  Consent was obtained.  They were taken to the operating  room in order to undergo the procedure.    PRE-OP ROM:      OPERATIVE REPORT:  The patient was transferred to the operating room table.  Time-out  was performed confirming patient name, medical record number,  surgical site, and adequate and appropriate imaging.  The patient  received appropriate IV antibiotics as well as tranexamic acid.  Once we were prepped and draped,midline skin incision was performed.  Hemostasis was obtained using electrocautery.  Underlying extensor mechanism was easily identified and entered using a standard medial  parapatellar arthrotomy performedsharply.  The infrapatellar and suprapatellar fat pads were debrided.Initial medial release was performed.  The patella was subluxed laterally.  Distal femur was entered using a step drill.  Distal  femoral cut was performed, and the femur was sized and prepared.  The tibia was subluxed forward using a double-prong PCL retractor.  The proximal tibia was cut in neutral mechanical axis using an  extramedullary tibial guide.  We then used the lamina  to clear out the posterior osteophytes and clear the meniscal remnants. We then sized the tibia and prepared it. We cut the patella freehand using a caliper to restore the native patellar height. We then trialed with multiple polyethylene inserts.  Once I was happy with the position of components and stability of the knee, all trial components were removed.  The  cut bony surfaces were thoroughly irrigated and dried.  The posterior capsule and surrounding soft tissues were injected with DEWEY solution we then cemented into place the real components.  The knee was held in extension until all cement was hardened.  All extraneous cement was  removed.  We then closed the extensor mechanism over a drain using interrupted #2 Ethibond as well as interrupted 0 Vicryl.  The subcu was closed with interrupted 2-0 Vicryl.  The skin was closed with  running 3-0 Biosyn followed by Dermabond and Steri-Strips.  Dry sterile dressing was placed.  The patient was transferred back to the hospital bed without incident or complication.  She will be  weightbearing as tolerated.  They will be on ASA and SCDs for DVT prophylaxis.         Task Performed by [facility] Assist or Physician Assistant:   Tanja LOUIS, was necessary to assist on this case due to the nature of the case and difficulty. During the case Tanja Parry served as my assist by preparing the leg, draping the leg, exposing the knee, retracting and manipulating the leg during surgery,  facilitating safe performance of the procedure, and closure of the wound.    Additional Details: WBAT, ASA    Attending Attestation: I was present and scrubbed for the key portions of the procedure.

## 2025-01-21 ENCOUNTER — HOME CARE VISIT (OUTPATIENT)
Dept: HOME HEALTH SERVICES | Facility: HOME HEALTH | Age: 65
End: 2025-01-21
Payer: COMMERCIAL

## 2025-01-21 VITALS — BODY MASS INDEX: 34.01 KG/M2 | WEIGHT: 265 LBS | HEIGHT: 74 IN

## 2025-01-21 PROCEDURE — G0151 HHCP-SERV OF PT,EA 15 MIN: HCPCS

## 2025-01-21 SDOH — HEALTH STABILITY: PHYSICAL HEALTH: EXERCISE TYPE: INSTRUCTED IN SUPINE, SEATED AND STANDING TKR EX'S WITH WRITTEN HEP PROVIDED

## 2025-01-21 ASSESSMENT — BALANCE ASSESSMENTS
ATTEMPTS TO ARISE: 0 - UNABLE WITHOUT HELP
NUDGED: 0 - BEGINS TO FALL
ARISING SCORE: 0
SITTING BALANCE: 1 - STEADY, SAFE
BALANCE SCORE: 4
SITTING DOWN: 1 - USES ARMS OR NOT SMOOTH MOTION
STANDING BALANCE: 1 - STEADY BUT WIDE STANCE AND USES CANE OR OTHER SUPPORT
NUDGED SCORE: 0
EYES CLOSED AT MAXIMUM POSITION NUDGED: 0 - UNSTEADY
IMMEDIATE STANDING BALANCE FIRST 5 SECONDS: 0 - UNSTEADY (STAGGERS, MOVES FEET, TRUNK SWAY)
ARISES: 0 - UNABLE WITHOUT HELP
TURNING 360 DEGREES STEPS: 0 - DISCONTINUOUS STEPS

## 2025-01-21 ASSESSMENT — ENCOUNTER SYMPTOMS
PERSON REPORTING PAIN: PATIENT
PAIN LOCATION - EXACERBATING FACTORS: ROM, EXERCISE
MUSCLE WEAKNESS: 1
HIGHEST PAIN SEVERITY IN PAST 24 HOURS: 8/10
PAIN: 1
PAIN SEVERITY GOAL: 0/10
PAIN LOCATION - PAIN SEVERITY: 2/10
LIMITED RANGE OF MOTION: 1
PAIN LOCATION - PAIN QUALITY: BURNING
ARTHRALGIAS: 1
HYPERTENSION: 1
LOWEST PAIN SEVERITY IN PAST 24 HOURS: 2/10
PAIN LOCATION - PAIN DURATION: CONSTANT
PAIN LOCATION - RELIEVING FACTORS: REST, ICE
PAIN LOCATION: RIGHT KNEE
SUBJECTIVE PAIN PROGRESSION: UNCHANGED
PAIN LOCATION - PAIN FREQUENCY: CONSTANT

## 2025-01-21 ASSESSMENT — GAIT ASSESSMENTS
STEP CONTINUITY: 0 - STOPPING OR DISCONTINUITY BETWEEN STEPS
GAIT SCORE: 3
WALKING STANCE: 0 - HEELS APART
PATH SCORE: 1
INITIATION OF GAIT IMMEDIATELY AFTER GO: 0 - ANY HESITANCY OR MULTIPLE ATTEMPTS TO START
STEP SYMMETRY: 0 - RIGHT AND LEFT STEP LENGTH NOT EQUAL
TRUNK: 0 - MARKED SWAY OR USES WALKING AID
PATH: 1 - MILD/MODERATE DEVIATION OR USES WALKING AID
TRUNK SCORE: 0
BALANCE AND GAIT SCORE: 7

## 2025-01-21 ASSESSMENT — ACTIVITIES OF DAILY LIVING (ADL)
CURRENT_FUNCTION: ONE PERSON
OASIS_M1830: 05
AMBULATION ASSISTANCE: ONE PERSON
ENTERING_EXITING_HOME: ONE PERSON
AMBULATION_DISTANCE/DURATION_TOLERATED: 40'

## 2025-01-21 NOTE — PROGRESS NOTES
Medication Education     Medication education for Abhishek Olson was provided to the patient  for the following medication(s):  Acetaminophen  Aspirin  Docusate  Meloxicam  Oxycodone  Pantoprazole  PEG  tramadol    Medication education provided by a Pharmacist:  Dose, frequency, storage How to take and what to do if a dose is missed Proper dose, indication, possible ADRs    Identified potential barriers to education:  None    Method(s) of Education:  Verbal    An opportunity to ask questions and receive answers was provided.     Assessment of understanding the patient :  2= meets goals/outcomes    Additional Notes (if applicable):     Tod Batista, PharmD

## 2025-01-23 ENCOUNTER — HOME CARE VISIT (OUTPATIENT)
Dept: HOME HEALTH SERVICES | Facility: HOME HEALTH | Age: 65
End: 2025-01-23
Payer: COMMERCIAL

## 2025-01-23 PROCEDURE — G0151 HHCP-SERV OF PT,EA 15 MIN: HCPCS

## 2025-01-24 ENCOUNTER — HOME CARE VISIT (OUTPATIENT)
Dept: HOME HEALTH SERVICES | Facility: HOME HEALTH | Age: 65
End: 2025-01-24
Payer: COMMERCIAL

## 2025-01-24 PROCEDURE — G0151 HHCP-SERV OF PT,EA 15 MIN: HCPCS

## 2025-01-25 ASSESSMENT — ENCOUNTER SYMPTOMS
PERSON REPORTING PAIN: PATIENT
PAIN LOCATION - EXACERBATING FACTORS: ROM, EXERCISE
HIGHEST PAIN SEVERITY IN PAST 24 HOURS: 6/10
PAIN SEVERITY GOAL: 0/10
PAIN LOCATION: RIGHT KNEE
HIGHEST PAIN SEVERITY IN PAST 24 HOURS: 5/10
PAIN LOCATION: RIGHT LEG
SUBJECTIVE PAIN PROGRESSION: GRADUALLY IMPROVING
PAIN LOCATION - PAIN DURATION: VARIABLE
PAIN: 1
PAIN LOCATION - PAIN QUALITY: BURNING
PAIN LOCATION - EXACERBATING FACTORS: ROM, EXERCISE, TRANSITION MOVEMENTS
PAIN LOCATION - PAIN QUALITY: ACHING
PAIN: 1
PERSON REPORTING PAIN: PATIENT
PAIN LOCATION - PAIN SEVERITY: 5/10
LOWEST PAIN SEVERITY IN PAST 24 HOURS: 0/10
PAIN LOCATION - PAIN FREQUENCY: INTERMITTENT
PAIN LOCATION - RELIEVING FACTORS: REST, POSITIONING, ICE
PAIN LOCATION - PAIN DURATION: VARIABLE
PAIN LOCATION - PAIN SEVERITY: 6/10
LOWEST PAIN SEVERITY IN PAST 24 HOURS: 0/10
PAIN SEVERITY GOAL: 0/10
SUBJECTIVE PAIN PROGRESSION: GRADUALLY IMPROVING
PAIN LOCATION - PAIN FREQUENCY: INTERMITTENT
PAIN LOCATION - RELIEVING FACTORS: REST, ICE, MEDS

## 2025-01-27 ENCOUNTER — HOME CARE VISIT (OUTPATIENT)
Dept: HOME HEALTH SERVICES | Facility: HOME HEALTH | Age: 65
End: 2025-01-27
Payer: COMMERCIAL

## 2025-01-27 DIAGNOSIS — Z96.651 S/P TOTAL KNEE ARTHROPLASTY, RIGHT: ICD-10-CM

## 2025-01-27 PROCEDURE — G0151 HHCP-SERV OF PT,EA 15 MIN: HCPCS

## 2025-01-27 RX ORDER — TRAMADOL HYDROCHLORIDE 50 MG/1
50 TABLET ORAL EVERY 6 HOURS PRN
Qty: 28 TABLET | Refills: 0 | Status: ON HOLD | OUTPATIENT
Start: 2025-01-27 | End: 2025-02-03

## 2025-01-27 RX ORDER — OXYCODONE HYDROCHLORIDE 5 MG/1
5 TABLET ORAL EVERY 6 HOURS PRN
Qty: 28 TABLET | Refills: 0 | Status: ON HOLD | OUTPATIENT
Start: 2025-01-27 | End: 2025-02-03

## 2025-01-27 ASSESSMENT — ENCOUNTER SYMPTOMS
PAIN LOCATION - PAIN SEVERITY: 2/10
HIGHEST PAIN SEVERITY IN PAST 24 HOURS: 5/10
PAIN LOCATION - PAIN DURATION: VARIABLE
LOWEST PAIN SEVERITY IN PAST 24 HOURS: 2/10
PAIN LOCATION - PAIN QUALITY: ACHING, DULL
PAIN LOCATION - PAIN FREQUENCY: INTERMITTENT
PAIN LOCATION - RELIEVING FACTORS: ICE, MEDS
PAIN SEVERITY GOAL: 0/10
PAIN LOCATION - EXACERBATING FACTORS: ROM, EXERCISE, TRANSITION MOVEMENTS
PAIN LOCATION: RIGHT KNEE
PAIN: 1
SUBJECTIVE PAIN PROGRESSION: GRADUALLY IMPROVING
PERSON REPORTING PAIN: PATIENT

## 2025-01-28 ENCOUNTER — HOME CARE VISIT (OUTPATIENT)
Dept: HOME HEALTH SERVICES | Facility: HOME HEALTH | Age: 65
End: 2025-01-28
Payer: COMMERCIAL

## 2025-01-28 PROCEDURE — G0151 HHCP-SERV OF PT,EA 15 MIN: HCPCS

## 2025-01-31 ENCOUNTER — HOME CARE VISIT (OUTPATIENT)
Dept: HOME HEALTH SERVICES | Facility: HOME HEALTH | Age: 65
End: 2025-01-31
Payer: COMMERCIAL

## 2025-01-31 PROCEDURE — G0151 HHCP-SERV OF PT,EA 15 MIN: HCPCS

## 2025-02-01 ENCOUNTER — APPOINTMENT (OUTPATIENT)
Dept: RADIOLOGY | Facility: HOSPITAL | Age: 65
DRG: 300 | End: 2025-02-01
Payer: COMMERCIAL

## 2025-02-01 ENCOUNTER — APPOINTMENT (OUTPATIENT)
Dept: CARDIOLOGY | Facility: HOSPITAL | Age: 65
DRG: 300 | End: 2025-02-01
Payer: COMMERCIAL

## 2025-02-01 ENCOUNTER — HOSPITAL ENCOUNTER (INPATIENT)
Facility: HOSPITAL | Age: 65
DRG: 300 | End: 2025-02-01
Attending: EMERGENCY MEDICINE | Admitting: STUDENT IN AN ORGANIZED HEALTH CARE EDUCATION/TRAINING PROGRAM
Payer: COMMERCIAL

## 2025-02-01 DIAGNOSIS — Z96.651 S/P TOTAL KNEE ARTHROPLASTY, RIGHT: ICD-10-CM

## 2025-02-01 DIAGNOSIS — I26.99 OTHER ACUTE PULMONARY EMBOLISM, UNSPECIFIED WHETHER ACUTE COR PULMONALE PRESENT (MULTI): Primary | ICD-10-CM

## 2025-02-01 DIAGNOSIS — M54.50 ACUTE LOW BACK PAIN WITHOUT SCIATICA, UNSPECIFIED BACK PAIN LATERALITY: ICD-10-CM

## 2025-02-01 DIAGNOSIS — M79.89 LEG SWELLING: ICD-10-CM

## 2025-02-01 DIAGNOSIS — R07.9 CHEST PAIN, UNSPECIFIED TYPE: ICD-10-CM

## 2025-02-01 DIAGNOSIS — J18.9 PNEUMONIA DUE TO INFECTIOUS ORGANISM, UNSPECIFIED LATERALITY, UNSPECIFIED PART OF LUNG: ICD-10-CM

## 2025-02-01 LAB
ALBUMIN SERPL BCP-MCNC: 4.2 G/DL (ref 3.4–5)
ALP SERPL-CCNC: 256 U/L (ref 33–136)
ALT SERPL W P-5'-P-CCNC: 139 U/L (ref 10–52)
ANION GAP SERPL CALC-SCNC: 12 MMOL/L (ref 10–20)
AST SERPL W P-5'-P-CCNC: 96 U/L (ref 9–39)
BASOPHILS # BLD AUTO: 0.02 X10*3/UL (ref 0–0.1)
BASOPHILS NFR BLD AUTO: 0.2 %
BILIRUB SERPL-MCNC: 2.1 MG/DL (ref 0–1.2)
BNP SERPL-MCNC: 10 PG/ML (ref 0–99)
BUN SERPL-MCNC: 25 MG/DL (ref 6–23)
CALCIUM SERPL-MCNC: 9.6 MG/DL (ref 8.6–10.3)
CARDIAC TROPONIN I PNL SERPL HS: 7 NG/L (ref 0–20)
CARDIAC TROPONIN I PNL SERPL HS: 8 NG/L (ref 0–20)
CHLORIDE SERPL-SCNC: 97 MMOL/L (ref 98–107)
CO2 SERPL-SCNC: 27 MMOL/L (ref 21–32)
CREAT SERPL-MCNC: 0.9 MG/DL (ref 0.5–1.3)
EGFRCR SERPLBLD CKD-EPI 2021: >90 ML/MIN/1.73M*2
EOSINOPHIL # BLD AUTO: 0.33 X10*3/UL (ref 0–0.7)
EOSINOPHIL NFR BLD AUTO: 3 %
ERYTHROCYTE [DISTWIDTH] IN BLOOD BY AUTOMATED COUNT: 13 % (ref 11.5–14.5)
GLUCOSE SERPL-MCNC: 112 MG/DL (ref 74–99)
HCT VFR BLD AUTO: 40.1 % (ref 41–52)
HGB BLD-MCNC: 13.3 G/DL (ref 13.5–17.5)
IMM GRANULOCYTES # BLD AUTO: 0.03 X10*3/UL (ref 0–0.7)
IMM GRANULOCYTES NFR BLD AUTO: 0.3 % (ref 0–0.9)
INR PPP: 1.1 (ref 0.9–1.1)
LIPASE SERPL-CCNC: 21 U/L (ref 9–82)
LYMPHOCYTES # BLD AUTO: 1.37 X10*3/UL (ref 1.2–4.8)
LYMPHOCYTES NFR BLD AUTO: 12.4 %
MCH RBC QN AUTO: 32 PG (ref 26–34)
MCHC RBC AUTO-ENTMCNC: 33.2 G/DL (ref 32–36)
MCV RBC AUTO: 96 FL (ref 80–100)
MONOCYTES # BLD AUTO: 1.23 X10*3/UL (ref 0.1–1)
MONOCYTES NFR BLD AUTO: 11.1 %
NEUTROPHILS # BLD AUTO: 8.1 X10*3/UL (ref 1.2–7.7)
NEUTROPHILS NFR BLD AUTO: 73 %
NRBC BLD-RTO: 0 /100 WBCS (ref 0–0)
PLATELET # BLD AUTO: 412 X10*3/UL (ref 150–450)
POTASSIUM SERPL-SCNC: 4 MMOL/L (ref 3.5–5.3)
PROT SERPL-MCNC: 7.8 G/DL (ref 6.4–8.2)
PROTHROMBIN TIME: 12.5 SECONDS (ref 9.8–12.8)
RBC # BLD AUTO: 4.16 X10*6/UL (ref 4.5–5.9)
SODIUM SERPL-SCNC: 132 MMOL/L (ref 136–145)
WBC # BLD AUTO: 11.1 X10*3/UL (ref 4.4–11.3)

## 2025-02-01 PROCEDURE — 2500000001 HC RX 250 WO HCPCS SELF ADMINISTERED DRUGS (ALT 637 FOR MEDICARE OP)

## 2025-02-01 PROCEDURE — 36415 COLL VENOUS BLD VENIPUNCTURE: CPT | Performed by: EMERGENCY MEDICINE

## 2025-02-01 PROCEDURE — 85025 COMPLETE CBC W/AUTO DIFF WBC: CPT | Performed by: EMERGENCY MEDICINE

## 2025-02-01 PROCEDURE — 84484 ASSAY OF TROPONIN QUANT: CPT | Performed by: EMERGENCY MEDICINE

## 2025-02-01 PROCEDURE — 99285 EMERGENCY DEPT VISIT HI MDM: CPT | Mod: 25 | Performed by: EMERGENCY MEDICINE

## 2025-02-01 PROCEDURE — 96365 THER/PROPH/DIAG IV INF INIT: CPT | Mod: 59

## 2025-02-01 PROCEDURE — 80074 ACUTE HEPATITIS PANEL: CPT | Mod: STJLAB

## 2025-02-01 PROCEDURE — 96376 TX/PRO/DX INJ SAME DRUG ADON: CPT

## 2025-02-01 PROCEDURE — 74177 CT ABD & PELVIS W/CONTRAST: CPT | Performed by: STUDENT IN AN ORGANIZED HEALTH CARE EDUCATION/TRAINING PROGRAM

## 2025-02-01 PROCEDURE — 96375 TX/PRO/DX INJ NEW DRUG ADDON: CPT

## 2025-02-01 PROCEDURE — 80061 LIPID PANEL: CPT

## 2025-02-01 PROCEDURE — 93005 ELECTROCARDIOGRAM TRACING: CPT

## 2025-02-01 PROCEDURE — 85610 PROTHROMBIN TIME: CPT | Performed by: EMERGENCY MEDICINE

## 2025-02-01 PROCEDURE — 80053 COMPREHEN METABOLIC PANEL: CPT | Performed by: EMERGENCY MEDICINE

## 2025-02-01 PROCEDURE — 2550000001 HC RX 255 CONTRASTS: Performed by: EMERGENCY MEDICINE

## 2025-02-01 PROCEDURE — 71275 CT ANGIOGRAPHY CHEST: CPT

## 2025-02-01 PROCEDURE — 71045 X-RAY EXAM CHEST 1 VIEW: CPT | Mod: FOREIGN READ | Performed by: RADIOLOGY

## 2025-02-01 PROCEDURE — 96372 THER/PROPH/DIAG INJ SC/IM: CPT

## 2025-02-01 PROCEDURE — 96367 TX/PROPH/DG ADDL SEQ IV INF: CPT

## 2025-02-01 PROCEDURE — 83690 ASSAY OF LIPASE: CPT

## 2025-02-01 PROCEDURE — 71045 X-RAY EXAM CHEST 1 VIEW: CPT

## 2025-02-01 PROCEDURE — 74177 CT ABD & PELVIS W/CONTRAST: CPT

## 2025-02-01 PROCEDURE — 2500000004 HC RX 250 GENERAL PHARMACY W/ HCPCS (ALT 636 FOR OP/ED): Mod: JZ | Performed by: EMERGENCY MEDICINE

## 2025-02-01 PROCEDURE — 82977 ASSAY OF GGT: CPT | Mod: STJLAB

## 2025-02-01 PROCEDURE — 2500000004 HC RX 250 GENERAL PHARMACY W/ HCPCS (ALT 636 FOR OP/ED)

## 2025-02-01 PROCEDURE — 83880 ASSAY OF NATRIURETIC PEPTIDE: CPT | Performed by: EMERGENCY MEDICINE

## 2025-02-01 PROCEDURE — 71275 CT ANGIOGRAPHY CHEST: CPT | Performed by: STUDENT IN AN ORGANIZED HEALTH CARE EDUCATION/TRAINING PROGRAM

## 2025-02-01 PROCEDURE — 2500000005 HC RX 250 GENERAL PHARMACY W/O HCPCS

## 2025-02-01 RX ORDER — ENOXAPARIN SODIUM 150 MG/ML
1 INJECTION SUBCUTANEOUS ONCE
Status: COMPLETED | OUTPATIENT
Start: 2025-02-01 | End: 2025-02-01

## 2025-02-01 RX ORDER — HYDROMORPHONE HYDROCHLORIDE 1 MG/ML
1 INJECTION, SOLUTION INTRAMUSCULAR; INTRAVENOUS; SUBCUTANEOUS ONCE
Status: COMPLETED | OUTPATIENT
Start: 2025-02-01 | End: 2025-02-01

## 2025-02-01 RX ORDER — HEPARIN SODIUM 10000 [USP'U]/100ML
0-4500 INJECTION, SOLUTION INTRAVENOUS CONTINUOUS
Status: CANCELLED | OUTPATIENT
Start: 2025-02-01

## 2025-02-01 RX ORDER — ACETAMINOPHEN 325 MG/1
650 TABLET ORAL ONCE
Status: COMPLETED | OUTPATIENT
Start: 2025-02-01 | End: 2025-02-01

## 2025-02-01 RX ORDER — MORPHINE SULFATE 4 MG/ML
4 INJECTION, SOLUTION INTRAMUSCULAR; INTRAVENOUS ONCE
Status: COMPLETED | OUTPATIENT
Start: 2025-02-01 | End: 2025-02-01

## 2025-02-01 RX ORDER — OXYCODONE HYDROCHLORIDE 10 MG/1
10 TABLET ORAL EVERY 6 HOURS PRN
Status: DISCONTINUED | OUTPATIENT
Start: 2025-02-01 | End: 2025-02-02

## 2025-02-01 RX ORDER — ENOXAPARIN SODIUM 150 MG/ML
1 INJECTION SUBCUTANEOUS EVERY 12 HOURS
Status: DISCONTINUED | OUTPATIENT
Start: 2025-02-02 | End: 2025-02-02

## 2025-02-01 RX ORDER — LIDOCAINE 560 MG/1
1 PATCH PERCUTANEOUS; TOPICAL; TRANSDERMAL ONCE
Status: COMPLETED | OUTPATIENT
Start: 2025-02-01 | End: 2025-02-02

## 2025-02-01 RX ORDER — OXYCODONE HYDROCHLORIDE 5 MG/1
5 TABLET ORAL EVERY 6 HOURS PRN
Status: DISCONTINUED | OUTPATIENT
Start: 2025-02-01 | End: 2025-02-02

## 2025-02-01 RX ADMIN — LIDOCAINE 4% 1 PATCH: 40 PATCH TOPICAL at 18:18

## 2025-02-01 RX ADMIN — IOHEXOL 75 ML: 350 INJECTION, SOLUTION INTRAVENOUS at 18:28

## 2025-02-01 RX ADMIN — AZITHROMYCIN MONOHYDRATE 500 MG: 500 INJECTION, POWDER, LYOPHILIZED, FOR SOLUTION INTRAVENOUS at 20:43

## 2025-02-01 RX ADMIN — CEFTRIAXONE 2 G: 2 INJECTION, POWDER, FOR SOLUTION INTRAMUSCULAR; INTRAVENOUS at 20:11

## 2025-02-01 RX ADMIN — HYDROMORPHONE HYDROCHLORIDE 1 MG: 1 INJECTION, SOLUTION INTRAMUSCULAR; INTRAVENOUS; SUBCUTANEOUS at 20:27

## 2025-02-01 RX ADMIN — ENOXAPARIN SODIUM 120 MG: 120 INJECTION SUBCUTANEOUS at 20:30

## 2025-02-01 RX ADMIN — ACETAMINOPHEN 325MG 650 MG: 325 TABLET ORAL at 16:44

## 2025-02-01 RX ADMIN — MORPHINE SULFATE 4 MG: 4 INJECTION, SOLUTION INTRAMUSCULAR; INTRAVENOUS at 16:41

## 2025-02-01 RX ADMIN — HYDROMORPHONE HYDROCHLORIDE 1 MG: 1 INJECTION, SOLUTION INTRAMUSCULAR; INTRAVENOUS; SUBCUTANEOUS at 18:19

## 2025-02-01 ASSESSMENT — PAIN - FUNCTIONAL ASSESSMENT
PAIN_FUNCTIONAL_ASSESSMENT: 0-10
PAIN_FUNCTIONAL_ASSESSMENT: 0-10

## 2025-02-01 ASSESSMENT — PAIN SCALES - GENERAL
PAINLEVEL_OUTOF10: 6
PAINLEVEL_OUTOF10: 6
PAINLEVEL_OUTOF10: 8
PAINLEVEL_OUTOF10: 7
PAINLEVEL_OUTOF10: 8
PAINLEVEL_OUTOF10: 4
PAINLEVEL_OUTOF10: 4

## 2025-02-01 ASSESSMENT — ENCOUNTER SYMPTOMS
PAIN LOCATION - PAIN FREQUENCY: INTERMITTENT
PERSON REPORTING PAIN: PATIENT
PAIN LOCATION: RIGHT KNEE
PAIN SEVERITY GOAL: 0/10
HIGHEST PAIN SEVERITY IN PAST 24 HOURS: 5/10
PAIN LOCATION - PAIN SEVERITY: 3/10
SUBJECTIVE PAIN PROGRESSION: GRADUALLY IMPROVING
PAIN LOCATION - PAIN QUALITY: ACHING
PAIN LOCATION - PAIN DURATION: VARIABLE
LOWEST PAIN SEVERITY IN PAST 24 HOURS: 0/10
PAIN: 1
PAIN LOCATION - RELIEVING FACTORS: ICE, MEDS, REST

## 2025-02-01 ASSESSMENT — PAIN DESCRIPTION - PAIN TYPE: TYPE: ACUTE PAIN

## 2025-02-01 ASSESSMENT — COLUMBIA-SUICIDE SEVERITY RATING SCALE - C-SSRS
6. HAVE YOU EVER DONE ANYTHING, STARTED TO DO ANYTHING, OR PREPARED TO DO ANYTHING TO END YOUR LIFE?: NO
2. HAVE YOU ACTUALLY HAD ANY THOUGHTS OF KILLING YOURSELF?: NO
1. IN THE PAST MONTH, HAVE YOU WISHED YOU WERE DEAD OR WISHED YOU COULD GO TO SLEEP AND NOT WAKE UP?: NO

## 2025-02-01 ASSESSMENT — PAIN DESCRIPTION - LOCATION
LOCATION: BACK
LOCATION: CHEST

## 2025-02-01 NOTE — ED PROVIDER NOTES
EMERGENCY DEPARTMENT ENCOUNTER      Pt Name: Abhishek Olson  MRN: [id number]  Birthdate 1960  Date of evaluation: 2/1/2025  Provider: Flavia Tanner MD    CHIEF COMPLAINT       Chief Complaint   Patient presents with    Chest Pain    Fever     Pt states he started having chest pain around 0300 today. Pt states the pain started in the back and radiates to the chest. Pt states he had a total knee in December 20th, 2024. Pt takes aspirin daily, family history of blood clots     HISTORY OF PRESENT ILLNESS    Abhishek Olson is a 64 y.o. year old male who presents to the ER for chest pain.  The patient reports that he had this chest pain since 3 AM today, and it originates from his lower back and wraps around his ribs the right side.  The patient describes the pain as sharp and stabbing, nothing has helped his pain.  He reports worsening pain on movement and taking deep breaths.  Patient also reports a fever today, Tmax of 101.7 °F.  He took Tylenol at noon and then Percocet and tramadol at 3 PM.  The patient denies any URI symptoms, shortness of breath, abdominal pain, changes in bowel or bladder habits.  He reports knee pain however it is not new, he has been going through physical therapy.  Patient denies any recent falls, trauma to the chest or the back, or car accidents.  Patient is not on any blood thinners except baby aspirin.    PMH is significant for knee replacement on 12/20/2024, hypertension, anxiety, GABRIELA, migraines.  Family history is significant for paternal death from MI at age 41.  His father also had a blood clot after surgery.     PAST MEDICAL HISTORY     Past Medical History:   Diagnosis Date    Anxiety     Arrhythmia     hx of afib s/p ablation    Depression     Essential (primary) hypertension 05/11/2022    Hypertension    GERD (gastroesophageal reflux disease)     History of blood transfusion     1984    Mixed hyperlipidemia     Elevated cholesterol with high triglycerides    Other specified  postprocedural states 06/07/2016    History of colonoscopy    Personal history of other diseases of urinary system     History of kidney problems    Post concussive syndrome     Sleep apnea      CURRENT MEDICATIONS       Previous Medications    ACETAMINOPHEN (TYLENOL EXTRA STRENGTH) 500 MG TABLET    Take 2 tablets (1,000 mg) by mouth every 6 hours if needed for mild pain (1 - 3).    ASPIRIN 81 MG EC TABLET    Take 1 tablet (81 mg) by mouth 2 times a day.    DOCUSATE SODIUM (COLACE) 100 MG CAPSULE    Take 1 capsule (100 mg) by mouth 2 times a day.    ESCITALOPRAM (LEXAPRO) 20 MG TABLET    Take 1 tablet (20 mg) by mouth once daily.    GABAPENTIN (NEURONTIN) 100 MG CAPSULE    Take 1 capsule (100 mg) by mouth 3 times a day.    HYDROXYZINE PAMOATE (VISTARIL) 25 MG CAPSULE    Take 25 mg by mouth every 12 hours.    INDOMETHACIN SR (INDOCIN SR) 75 MG ER CAPSULE    Take 1 capsule (75 mg) by mouth 2 times a day as needed.    LISINOPRIL-HYDROCHLOROTHIAZIDE 20-25 MG TABLET    Take 1 tablet by mouth once daily.    LISINOPRIL-HYDROCHLOROTHIAZIDE 20-25 MG TABLET    TAKE ONE TABLET BY MOUTH DAILY    LISINOPRIL-HYDROCHLOROTHIAZIDE 20-25 MG TABLET    Take 1 tablet by mouth once daily.    MELOXICAM (MOBIC) 15 MG TABLET    Take 1 tablet (15 mg) by mouth once daily. Take with food.    MELOXICAM (MOBIC) 15 MG TABLET    Take 1 tablet (15 mg) by mouth once daily.    OXYCODONE (ROXICODONE) 5 MG IMMEDIATE RELEASE TABLET    Take 1 tablet (5 mg) by mouth every 6 hours if needed for severe pain (7 - 10) for up to 7 days.    PANTOPRAZOLE (PROTONIX) 40 MG EC TABLET    Take 1 tablet (40 mg) by mouth once daily. Do not crush, chew, or split.    POLYETHYLENE GLYCOL (GLYCOLAX, MIRALAX) 17 GRAM/DOSE POWDER    Mix 1 cap (17g) into 8 ounces of fluid.    ROSUVASTATIN (CRESTOR) 20 MG TABLET    TAKE ONE TABLET BY MOUTH DAILY    TADALAFIL (CIALIS) 20 MG TABLET    Take 1 tablet (20 mg) by mouth 1 time if needed for erectile dysfunction (take 1 hour prior to  activity).    TESTOSTERONE 20.25 MG/1.25 GRAM (1.62 %) GEL IN METERED-DOSE PUMP    apply 4 pumps topically to the upper arm once daily    TESTOSTERONE 20.25 MG/1.25 GRAM (1.62 %) GEL IN METERED-DOSE PUMP    APPLY 4 PUMPS TO UPPER ARM DAILY FOR LOW TESTOSTERONE    TRAMADOL (ULTRAM) 50 MG TABLET    Take 1 tablet (50 mg) by mouth every 6 hours if needed for severe pain (7 - 10) for up to 7 days.    VENLAFAXINE XR (EFFEXOR-XR) 75 MG 24 HR CAPSULE    Take 1 capsule (75 mg) by mouth once daily.     SURGICAL HISTORY       Past Surgical History:   Procedure Laterality Date    ABLATION OF DYSRHYTHMIC FOCUS      COLONOSCOPY      HIP ARTHROPLASTY      OTHER SURGICAL HISTORY  05/03/2019    Hernia repair    OTHER SURGICAL HISTORY Left 05/03/2019    Orbital blowout fracture repair    OTHER SURGICAL HISTORY Bilateral 05/03/2019    Hip surgery    SHOULDER SURGERY Bilateral 03/31/2015    Shoulder Surgery     ALLERGIES     Penicillins  FAMILY HISTORY       Family History   Problem Relation Name Age of Onset    Multiple myeloma Mother      Other (pulmonary hypertension) Father      Hypertension Brother       SOCIAL HISTORY       Social History     Tobacco Use    Smoking status: Never    Smokeless tobacco: Never   Vaping Use    Vaping status: Never Used   Substance Use Topics    Alcohol use: Not Currently    Drug use: Yes     Types: Marijuana     PHYSICAL EXAM  (up to 7 for level 4, 8 or more for level 5)     ED Triage Vitals [02/01/25 1558]   Temperature Heart Rate Respirations BP   36.6 °C (97.9 °F) (!) 114 (!) 22 142/89      Pulse Ox Temp Source Heart Rate Source Patient Position   96 % Temporal Monitor Sitting      BP Location FiO2 (%)     Right arm --       Physical Exam  Constitutional:       General: He is in acute distress.      Appearance: He is obese. He is not toxic-appearing.   HENT:      Head: Normocephalic and atraumatic.   Eyes:      Extraocular Movements: Extraocular movements intact.      Pupils: Pupils are equal,  round, and reactive to light.   Cardiovascular:      Rate and Rhythm: Regular rhythm. Tachycardia present.      Heart sounds: Normal heart sounds. No murmur heard.  Pulmonary:      Effort: Pulmonary effort is normal. No tachypnea or respiratory distress.      Breath sounds: Normal breath sounds.   Chest:      Comments: Tenderness to the right chest with palpation.  Abdominal:      Palpations: Abdomen is soft.      Tenderness: There is abdominal tenderness in the right upper quadrant.   Musculoskeletal:      Comments: Incision down the right knee, no dehiscence, no purulent discharge, incision held together by Steri-Strips.   Skin:     General: Skin is warm and dry.      Capillary Refill: Capillary refill takes less than 2 seconds.   Neurological:      Mental Status: He is alert.        DIAGNOSTIC RESULTS   LABS:  Labs Reviewed   CBC WITH AUTO DIFFERENTIAL - Abnormal       Result Value    WBC 11.1      nRBC 0.0      RBC 4.16 (*)     Hemoglobin 13.3 (*)     Hematocrit 40.1 (*)     MCV 96      MCH 32.0      MCHC 33.2      RDW 13.0      Platelets 412      Neutrophils % 73.0      Immature Granulocytes %, Automated 0.3      Lymphocytes % 12.4      Monocytes % 11.1      Eosinophils % 3.0      Basophils % 0.2      Neutrophils Absolute 8.10 (*)     Immature Granulocytes Absolute, Automated 0.03      Lymphocytes Absolute 1.37      Monocytes Absolute 1.23 (*)     Eosinophils Absolute 0.33      Basophils Absolute 0.02     COMPREHENSIVE METABOLIC PANEL - Abnormal    Glucose 112 (*)     Sodium 132 (*)     Potassium 4.0      Chloride 97 (*)     Bicarbonate 27      Anion Gap 12      Urea Nitrogen 25 (*)     Creatinine 0.90      eGFR >90      Calcium 9.6      Albumin 4.2      Alkaline Phosphatase 256 (*)     Total Protein 7.8      AST 96 (*)     Bilirubin, Total 2.1 (*)      (*)    B-TYPE NATRIURETIC PEPTIDE - Normal    BNP 10      Narrative:        <100 pg/mL - Heart failure unlikely  100-299 pg/mL - Intermediate probability  of acute heart                  failure exacerbation. Correlate with clinical                  context and patient history.    >=300 pg/mL - Heart Failure likely. Correlate with clinical                  context and patient history.    BNP testing is performed using different testing methodology at [facility] than at other [facility]. Direct result comparisons should only be made within the same method.      PROTIME-INR - Normal    Protime 12.5      INR 1.1     SERIAL TROPONIN-INITIAL - Normal    Troponin I, High Sensitivity 8      Narrative:     Less than 99th percentile of normal range cutoff-  Female and children under 18 years old <14 ng/L; Male <21 ng/L: Negative  Repeat testing should be performed if clinically indicated.     Female and children under 18 years old 14-50 ng/L; Male 21-50 ng/L:  Consistent with possible cardiac damage and possible increased clinical   risk. Serial measurements may help to assess extent of myocardial damage.     >50 ng/L: Consistent with cardiac damage, increased clinical risk and  myocardial infarction. Serial measurements may help assess extent of   myocardial damage.      NOTE: Children less than 1 year old may have higher baseline troponin   levels and results should be interpreted in conjunction with the overall   clinical context.     NOTE: Troponin I testing is performed using a different   testing methodology at [facility] than at other   [facility]. Direct result comparisons should only   be made within the same method.   SERIAL TROPONIN, 1 HOUR - Normal    Troponin I, High Sensitivity 7      Narrative:     Less than 99th percentile of normal range cutoff-  Female and children under 18 years old <14 ng/L; Male <21 ng/L: Negative  Repeat testing should be performed if clinically indicated.     Female and children under 18 years old 14-50 ng/L; Male 21-50 ng/L:  Consistent with possible cardiac damage and possible increased clinical    risk. Serial measurements may help to assess extent of myocardial damage.     >50 ng/L: Consistent with cardiac damage, increased clinical risk and  myocardial infarction. Serial measurements may help assess extent of   myocardial damage.      NOTE: Children less than 1 year old may have higher baseline troponin   levels and results should be interpreted in conjunction with the overall   clinical context.     NOTE: Troponin I testing is performed using a different   testing methodology at [facility] than at other   [facility]. Direct result comparisons should only   be made within the same method.   LIPASE - Normal    Lipase 21      Narrative:     Venipuncture immediately after or during the administration of Metamizole may lead to falsely low results. Testing should be performed immediately prior to Metamizole dosing.   TROPONIN SERIES- (INITIAL, 1 HR)    Narrative:     The following orders were created for panel order Troponin I Series, High Sensitivity (0, 1 HR).  Procedure                               Abnormality         Status                     ---------                               -----------         ------                     Troponin I, High Sensiti...[[id number]]  Normal              Final result               Troponin, High Sensitivi...[[id number]]  Normal              Final result                 Please view results for these tests on the individual orders.     All other labs were within normal range or not returned as of this dictation.  Imaging  CT angio chest for pulmonary embolism   Final Result   1. Several segmental pulmonary emboli are present in the right upper   lobe, with several additional suspected in the in the right lower   lobe.   2. Somewhat asymmetric enlargement of the right ventricle is present   compared to the contralateral left, favored to be secondary to left   ventricular hypertrophy. Correlate with echocardiogram and ejection   fraction.   3. Volume  loss/atelectasis is present in the lower lobes bilaterally,   with some patchy and ground-glass opacities noted in the medial   aspect of the right lower lobe with the associated small right-sided   pleural effusion, possibly representing changes of developing   pneumonia versus pulmonary infarction. Correlate with physical exam.        MACRO:   None        Signed by: Doug Ewing 2/1/2025 7:11 PM   Dictation workstation:   [id number]      CT abdomen pelvis w IV contrast   Final Result   1. No acute abnormality is identified in the abdomen or pelvis. There   is no evidence of cholecystitis or pancreatitis.   2. Numerous diverticula are present in the descending and sigmoid   colon without evidence of acute diverticulitis.        MACRO:   None.        Signed by: Doug Ewing 2/1/2025 7:17 PM   Dictation workstation:   [id number]      XR chest 1 view   Final Result   No acute pulmonary pathology.   Signed by Kamron Ortez MD         Procedure  Procedures  EMERGENCY DEPARTMENT COURSE/MDM:   Medical Decision Making    Vitals:    Vitals:    02/01/25 2030 02/01/25 2045 02/01/25 2100 02/01/25 2130   BP:   (!) 144/96 138/78   BP Location:       Patient Position:       Pulse: 97 87 90 91   Resp: (!) 30 13 (!) 26 19   Temp:       TempSrc:       SpO2: 94% (!) 92% (!) 93% 96%   Weight:       Height:         Abhishek Olson is a male 64 y.o. who presents to the ER for chest pain. On arrival the patients vital signs were: Afebrile, Tachycardic, and Normotensive. History obtained from: patient and Spouse.     The differential diagnoses include MI, costochondritis, gastritis, pancreatitis, cholecystitis.    Physical exam significant for right lumbar pain on palpation of the paravertebral muscles.  There is no midline tenderness to the cervical thoracic or lumbar spine.  There is tenderness to palpation on the right ribs, wrapping to the front chest.  No pain in the left chest.  Aguilar sign is positive, the  abdomen is soft to palpation.  Tenderness in the right upper quadrant to palpation.  There is voluntary guarding.  The heart is tachycardic but in regular rhythm.  Lungs are clear to auscultation.  The right knee has an incision over it, no dehiscence or purulent discharge coming from the incision.  It is held together by Steri-Strips.  Some erythema and edema on the right knee but not tender to palpation.    Independent assessment of the labs, CBC is within normal limits, slight anemia however patient had recent surgery.  No evidence of acute infection.  CMP did not show  evidence of PAPI.  However there is evidence of some liver dysfunction with  AST 96, bilirubin 2.1.  Troponins are within normal limits, BNP was 10, PT/INR within normal limits.    Chest x-ray is within normal limits, no acute cardiopulmonary processes.  CT of the abdomen pelvis did not show any acute processes, it did show severe intervertebral disc height loss at L4-L5 and L5-S1.  CTA showed several segmental pulmonary emboli are present in the right upper lobe, with several additional suspected in the in the right lower lobe. There is some asymmetric enlargement of the right atrium compared to the left ventricle, likely due to left ventricular hypertrophy. No document of R heart strain.  There is some small consolidations in the right lower lobes, possible pneumonia.    The patient was given morphine, Dilaudid, lidocaine patch for his back pain.  He was given Lovenox for his PE.  He was given azithromycin and Rocephin for his possible pneumonia.     He is admitted for his PE with cardiac workup, pain management and pneumonia.    ED Course as of 02/01/25 2233   Sat Feb 01, 2025 1950 Several segmental pulmonary emboli are present in the right upper  lobe, with several additional suspected in the in the right lower lobe   [GV]      ED Course User Index  [GV] Flavia Tanner MD         Diagnoses as of 02/01/25 2233   Other acute pulmonary  embolism, unspecified whether acute cor pulmonale present (Multi)   Chest pain, unspecified type   Pneumonia due to infectious organism, unspecified laterality, unspecified part of lung   Acute low back pain without sciatica, unspecified back pain laterality       Consultant discussions: PERT was consulted after medicine requested due to the patient's CTA results.  PERT did not believe that the patient had any right heart strain from an obstructing thrombus, did not believe that the patient had a saddle emboli due to the patient's stable vital signs and negative CT imaging.  Recommended continuing anticoagulation and cardiac workup.    External Records Reviewed: I reviewed recent and relevant outside records including inpatient notes, outpatient records      Shared decision making for disposition  Patient and/or patient´s representative was counseled regarding labs, imaging, likely diagnosis. All questions were answered. Recommendation was made   for Admission given the need for further escalation of care to inpatient management. Patient agreed and was admitted in stable condition. Admitting team was notified of any pending labs or imaging to ensure continuity of care.     ED Medications administered this visit:    Medications   lidocaine 4 % patch 1 patch (1 patch transdermal Medication Applied 2/1/25 1818)   morphine injection 4 mg (4 mg intravenous Given 2/1/25 1641)   acetaminophen (Tylenol) tablet 650 mg (650 mg oral Given 2/1/25 1644)   iohexol (OMNIPaque) 350 mg iodine/mL solution 75 mL (75 mL intravenous Given 2/1/25 1828)   HYDROmorphone (Dilaudid) injection 1 mg (1 mg intravenous Given 2/1/25 1819)   enoxaparin (Lovenox) syringe 120 mg (120 mg subcutaneous Given 2/1/25 2030)   cefTRIAXone (Rocephin) 2 g in sodium chloride 0.9% IV 50 mL (0 g intravenous Stopped 2/1/25 2043)   azithromycin (Zithromax) 500 mg in dextrose 5%  mL (0 mg intravenous Stopped 2/1/25 2155)   HYDROmorphone (Dilaudid) injection  1 mg (1 mg intravenous Given 2/1/25 2027)       New Prescriptions from this visit:    New Prescriptions    No medications on file       Follow-up:  No follow-up provider specified.      Final Impression:   1. Other acute pulmonary embolism, unspecified whether acute cor pulmonale present (Multi)    2. Chest pain, unspecified type    3. Pneumonia due to infectious organism, unspecified laterality, unspecified part of lung    4. Acute low back pain without sciatica, unspecified back pain laterality          Please excuse any misspellings or unintended errors related to the Dragon speech recognition software used to dictate this note.    I reviewed the case with the attending ED physician. The attending ED physician agrees with the plan.      Flavia Tanner MD  Resident  02/01/25 0625

## 2025-02-02 VITALS
WEIGHT: 260 LBS | OXYGEN SATURATION: 95 % | HEIGHT: 74 IN | TEMPERATURE: 97 F | DIASTOLIC BLOOD PRESSURE: 95 MMHG | RESPIRATION RATE: 16 BRPM | HEART RATE: 87 BPM | BODY MASS INDEX: 33.37 KG/M2 | SYSTOLIC BLOOD PRESSURE: 153 MMHG

## 2025-02-02 LAB
ALBUMIN SERPL BCP-MCNC: 3.8 G/DL (ref 3.4–5)
ALP SERPL-CCNC: 221 U/L (ref 33–136)
ALT SERPL W P-5'-P-CCNC: 131 U/L (ref 10–52)
ANION GAP SERPL CALC-SCNC: 14 MMOL/L (ref 10–20)
AST SERPL W P-5'-P-CCNC: 89 U/L (ref 9–39)
BILIRUB DIRECT SERPL-MCNC: 0.5 MG/DL (ref 0–0.3)
BILIRUB SERPL-MCNC: 1.6 MG/DL (ref 0–1.2)
BUN SERPL-MCNC: 22 MG/DL (ref 6–23)
CALCIUM SERPL-MCNC: 9.1 MG/DL (ref 8.6–10.3)
CHLORIDE SERPL-SCNC: 95 MMOL/L (ref 98–107)
CHOLEST SERPL-MCNC: 164 MG/DL (ref 0–199)
CHOLESTEROL/HDL RATIO: 3.7
CO2 SERPL-SCNC: 29 MMOL/L (ref 21–32)
CREAT SERPL-MCNC: 0.93 MG/DL (ref 0.5–1.3)
EGFRCR SERPLBLD CKD-EPI 2021: >90 ML/MIN/1.73M*2
ERYTHROCYTE [DISTWIDTH] IN BLOOD BY AUTOMATED COUNT: 12.9 % (ref 11.5–14.5)
GGT SERPL-CCNC: 519 U/L (ref 5–64)
GLUCOSE SERPL-MCNC: 120 MG/DL (ref 74–99)
HAV IGM SER QL: NONREACTIVE
HBV CORE IGM SER QL: NONREACTIVE
HBV SURFACE AG SERPL QL IA: NONREACTIVE
HCT VFR BLD AUTO: 35.8 % (ref 41–52)
HCV AB SER QL: NONREACTIVE
HDLC SERPL-MCNC: 44.2 MG/DL
HGB BLD-MCNC: 11.7 G/DL (ref 13.5–17.5)
LDLC SERPL CALC-MCNC: 99 MG/DL
MAGNESIUM SERPL-MCNC: 1.97 MG/DL (ref 1.6–2.4)
MCH RBC QN AUTO: 32.1 PG (ref 26–34)
MCHC RBC AUTO-ENTMCNC: 32.7 G/DL (ref 32–36)
MCV RBC AUTO: 98 FL (ref 80–100)
NON HDL CHOLESTEROL: 120 MG/DL (ref 0–149)
NRBC BLD-RTO: 0 /100 WBCS (ref 0–0)
PHOSPHATE SERPL-MCNC: 3.8 MG/DL (ref 2.5–4.9)
PLATELET # BLD AUTO: 356 X10*3/UL (ref 150–450)
POTASSIUM SERPL-SCNC: 4 MMOL/L (ref 3.5–5.3)
PROT SERPL-MCNC: 7.1 G/DL (ref 6.4–8.2)
RBC # BLD AUTO: 3.64 X10*6/UL (ref 4.5–5.9)
SODIUM SERPL-SCNC: 134 MMOL/L (ref 136–145)
TRIGL SERPL-MCNC: 103 MG/DL (ref 0–149)
VLDL: 21 MG/DL (ref 0–40)
WBC # BLD AUTO: 10.8 X10*3/UL (ref 4.4–11.3)

## 2025-02-02 PROCEDURE — 96372 THER/PROPH/DIAG INJ SC/IM: CPT

## 2025-02-02 PROCEDURE — 2500000005 HC RX 250 GENERAL PHARMACY W/O HCPCS

## 2025-02-02 PROCEDURE — 2500000001 HC RX 250 WO HCPCS SELF ADMINISTERED DRUGS (ALT 637 FOR MEDICARE OP)

## 2025-02-02 PROCEDURE — 82248 BILIRUBIN DIRECT: CPT

## 2025-02-02 PROCEDURE — 83735 ASSAY OF MAGNESIUM: CPT

## 2025-02-02 PROCEDURE — 36415 COLL VENOUS BLD VENIPUNCTURE: CPT

## 2025-02-02 PROCEDURE — 85027 COMPLETE CBC AUTOMATED: CPT

## 2025-02-02 PROCEDURE — 2500000004 HC RX 250 GENERAL PHARMACY W/ HCPCS (ALT 636 FOR OP/ED): Mod: JZ

## 2025-02-02 PROCEDURE — 2500000002 HC RX 250 W HCPCS SELF ADMINISTERED DRUGS (ALT 637 FOR MEDICARE OP, ALT 636 FOR OP/ED)

## 2025-02-02 PROCEDURE — 80053 COMPREHEN METABOLIC PANEL: CPT

## 2025-02-02 PROCEDURE — 96376 TX/PRO/DX INJ SAME DRUG ADON: CPT

## 2025-02-02 PROCEDURE — 84100 ASSAY OF PHOSPHORUS: CPT

## 2025-02-02 PROCEDURE — 99223 1ST HOSP IP/OBS HIGH 75: CPT

## 2025-02-02 PROCEDURE — 1200000002 HC GENERAL ROOM WITH TELEMETRY DAILY

## 2025-02-02 RX ORDER — HYDROMORPHONE HYDROCHLORIDE 1 MG/ML
1 INJECTION, SOLUTION INTRAMUSCULAR; INTRAVENOUS; SUBCUTANEOUS EVERY 4 HOURS PRN
Status: DISCONTINUED | OUTPATIENT
Start: 2025-02-02 | End: 2025-02-03

## 2025-02-02 RX ORDER — OXYCODONE HYDROCHLORIDE 10 MG/1
10 TABLET ORAL EVERY 6 HOURS PRN
Status: DISCONTINUED | OUTPATIENT
Start: 2025-02-02 | End: 2025-02-02

## 2025-02-02 RX ORDER — VENLAFAXINE HYDROCHLORIDE 75 MG/1
75 CAPSULE, EXTENDED RELEASE ORAL DAILY
Status: DISCONTINUED | OUTPATIENT
Start: 2025-02-02 | End: 2025-02-04 | Stop reason: HOSPADM

## 2025-02-02 RX ORDER — OXYCODONE HYDROCHLORIDE 5 MG/1
5 TABLET ORAL EVERY 6 HOURS PRN
Status: DISCONTINUED | OUTPATIENT
Start: 2025-02-02 | End: 2025-02-02

## 2025-02-02 RX ORDER — GABAPENTIN 100 MG/1
100 CAPSULE ORAL 3 TIMES DAILY
Status: DISCONTINUED | OUTPATIENT
Start: 2025-02-02 | End: 2025-02-04 | Stop reason: HOSPADM

## 2025-02-02 RX ORDER — ROSUVASTATIN CALCIUM 20 MG/1
20 TABLET, COATED ORAL DAILY
Status: DISCONTINUED | OUTPATIENT
Start: 2025-02-02 | End: 2025-02-04 | Stop reason: HOSPADM

## 2025-02-02 RX ORDER — OXYCODONE HYDROCHLORIDE 10 MG/1
10 TABLET ORAL EVERY 4 HOURS PRN
Status: DISCONTINUED | OUTPATIENT
Start: 2025-02-02 | End: 2025-02-02

## 2025-02-02 RX ORDER — INDOMETHACIN 75 MG/1
75 CAPSULE, EXTENDED RELEASE ORAL 2 TIMES DAILY PRN
Status: DISCONTINUED | OUTPATIENT
Start: 2025-02-02 | End: 2025-02-04 | Stop reason: HOSPADM

## 2025-02-02 RX ORDER — POLYETHYLENE GLYCOL 3350 17 G/17G
17 POWDER, FOR SOLUTION ORAL DAILY
Status: DISCONTINUED | OUTPATIENT
Start: 2025-02-02 | End: 2025-02-04 | Stop reason: HOSPADM

## 2025-02-02 RX ORDER — OXYCODONE HYDROCHLORIDE 5 MG/1
5 TABLET ORAL EVERY 4 HOURS PRN
Status: DISCONTINUED | OUTPATIENT
Start: 2025-02-02 | End: 2025-02-02

## 2025-02-02 RX ORDER — ESCITALOPRAM OXALATE 20 MG/1
20 TABLET ORAL DAILY
Status: DISCONTINUED | OUTPATIENT
Start: 2025-02-02 | End: 2025-02-04 | Stop reason: HOSPADM

## 2025-02-02 RX ORDER — ASPIRIN 81 MG/1
81 TABLET ORAL DAILY
Status: DISCONTINUED | OUTPATIENT
Start: 2025-02-02 | End: 2025-02-02

## 2025-02-02 RX ORDER — ASPIRIN 81 MG/1
81 TABLET ORAL 2 TIMES DAILY
Status: DISCONTINUED | OUTPATIENT
Start: 2025-02-02 | End: 2025-02-02

## 2025-02-02 RX ORDER — DOCUSATE SODIUM 100 MG/1
100 CAPSULE, LIQUID FILLED ORAL 2 TIMES DAILY
Status: DISCONTINUED | OUTPATIENT
Start: 2025-02-02 | End: 2025-02-04 | Stop reason: HOSPADM

## 2025-02-02 RX ORDER — PANTOPRAZOLE SODIUM 40 MG/1
40 TABLET, DELAYED RELEASE ORAL DAILY
Status: DISCONTINUED | OUTPATIENT
Start: 2025-02-02 | End: 2025-02-04 | Stop reason: HOSPADM

## 2025-02-02 RX ORDER — POLYETHYLENE GLYCOL 3350 17 G/17G
17 POWDER, FOR SOLUTION ORAL DAILY PRN
Status: DISCONTINUED | OUTPATIENT
Start: 2025-02-02 | End: 2025-02-04 | Stop reason: HOSPADM

## 2025-02-02 RX ADMIN — ASPIRIN 81 MG: 81 TABLET, COATED ORAL at 14:11

## 2025-02-02 RX ADMIN — GABAPENTIN 100 MG: 100 CAPSULE ORAL at 20:02

## 2025-02-02 RX ADMIN — Medication 2 L/MIN: at 09:03

## 2025-02-02 RX ADMIN — HYDROMORPHONE HYDROCHLORIDE 1 MG: 1 INJECTION, SOLUTION INTRAMUSCULAR; INTRAVENOUS; SUBCUTANEOUS at 18:17

## 2025-02-02 RX ADMIN — GABAPENTIN 100 MG: 100 CAPSULE ORAL at 14:11

## 2025-02-02 RX ADMIN — HYDROMORPHONE HYDROCHLORIDE 1 MG: 1 INJECTION, SOLUTION INTRAMUSCULAR; INTRAVENOUS; SUBCUTANEOUS at 10:14

## 2025-02-02 RX ADMIN — OXYCODONE HYDROCHLORIDE 5 MG: 5 TABLET ORAL at 06:09

## 2025-02-02 RX ADMIN — DOCUSATE SODIUM 100 MG: 100 CAPSULE, LIQUID FILLED ORAL at 13:46

## 2025-02-02 RX ADMIN — HYDROMORPHONE HYDROCHLORIDE 1 MG: 1 INJECTION, SOLUTION INTRAMUSCULAR; INTRAVENOUS; SUBCUTANEOUS at 22:23

## 2025-02-02 RX ADMIN — ESCITALOPRAM OXALATE 20 MG: 20 TABLET ORAL at 13:46

## 2025-02-02 RX ADMIN — OXYCODONE HYDROCHLORIDE 10 MG: 10 TABLET ORAL at 00:05

## 2025-02-02 RX ADMIN — POLYETHYLENE GLYCOL 3350 17 G: 17 POWDER, FOR SOLUTION ORAL at 13:46

## 2025-02-02 RX ADMIN — HYDROCHLOROTHIAZIDE: 25 TABLET ORAL at 13:46

## 2025-02-02 RX ADMIN — DOCUSATE SODIUM 100 MG: 100 CAPSULE, LIQUID FILLED ORAL at 20:02

## 2025-02-02 RX ADMIN — VENLAFAXINE HYDROCHLORIDE 75 MG: 75 CAPSULE, EXTENDED RELEASE ORAL at 13:46

## 2025-02-02 RX ADMIN — ENOXAPARIN SODIUM 120 MG: 120 INJECTION SUBCUTANEOUS at 07:54

## 2025-02-02 RX ADMIN — HYDROMORPHONE HYDROCHLORIDE 1 MG: 1 INJECTION, SOLUTION INTRAMUSCULAR; INTRAVENOUS; SUBCUTANEOUS at 14:12

## 2025-02-02 RX ADMIN — APIXABAN 10 MG: 5 TABLET, FILM COATED ORAL at 18:17

## 2025-02-02 RX ADMIN — Medication 2 L/MIN: at 00:07

## 2025-02-02 RX ADMIN — Medication 2 L/MIN: at 20:17

## 2025-02-02 RX ADMIN — ROSUVASTATIN CALCIUM 20 MG: 20 TABLET, FILM COATED ORAL at 13:46

## 2025-02-02 RX ADMIN — HYDROMORPHONE HYDROCHLORIDE 0.5 MG: 1 INJECTION, SOLUTION INTRAMUSCULAR; INTRAVENOUS; SUBCUTANEOUS at 02:09

## 2025-02-02 RX ADMIN — PANTOPRAZOLE SODIUM 40 MG: 40 TABLET, DELAYED RELEASE ORAL at 13:47

## 2025-02-02 SDOH — SOCIAL STABILITY: SOCIAL INSECURITY: HAS ANYONE EVER THREATENED TO HURT YOUR FAMILY OR YOUR PETS?: NO

## 2025-02-02 SDOH — SOCIAL STABILITY: SOCIAL INSECURITY: ARE THERE ANY APPARENT SIGNS OF INJURIES/BEHAVIORS THAT COULD BE RELATED TO ABUSE/NEGLECT?: NO

## 2025-02-02 SDOH — ECONOMIC STABILITY: INCOME INSECURITY: IN THE PAST 12 MONTHS HAS THE ELECTRIC, GAS, OIL, OR WATER COMPANY THREATENED TO SHUT OFF SERVICES IN YOUR HOME?: NO

## 2025-02-02 SDOH — SOCIAL STABILITY: SOCIAL INSECURITY
WITHIN THE LAST YEAR, HAVE YOU BEEN KICKED, HIT, SLAPPED, OR OTHERWISE PHYSICALLY HURT BY YOUR PARTNER OR EX-PARTNER?: NO

## 2025-02-02 SDOH — SOCIAL STABILITY: SOCIAL INSECURITY
WITHIN THE LAST YEAR, HAVE YOU BEEN RAPED OR FORCED TO HAVE ANY KIND OF SEXUAL ACTIVITY BY YOUR PARTNER OR EX-PARTNER?: NO

## 2025-02-02 SDOH — SOCIAL STABILITY: SOCIAL INSECURITY: WITHIN THE LAST YEAR, HAVE YOU BEEN AFRAID OF YOUR PARTNER OR EX-PARTNER?: NO

## 2025-02-02 SDOH — ECONOMIC STABILITY: FOOD INSECURITY: WITHIN THE PAST 12 MONTHS, YOU WORRIED THAT YOUR FOOD WOULD RUN OUT BEFORE YOU GOT THE MONEY TO BUY MORE.: NEVER TRUE

## 2025-02-02 SDOH — SOCIAL STABILITY: SOCIAL INSECURITY: ARE YOU OR HAVE YOU BEEN THREATENED OR ABUSED PHYSICALLY, EMOTIONALLY, OR SEXUALLY BY ANYONE?: NO

## 2025-02-02 SDOH — SOCIAL STABILITY: SOCIAL INSECURITY: WITHIN THE LAST YEAR, HAVE YOU BEEN HUMILIATED OR EMOTIONALLY ABUSED IN OTHER WAYS BY YOUR PARTNER OR EX-PARTNER?: NO

## 2025-02-02 SDOH — SOCIAL STABILITY: SOCIAL INSECURITY: HAVE YOU HAD THOUGHTS OF HARMING ANYONE ELSE?: NO

## 2025-02-02 SDOH — SOCIAL STABILITY: SOCIAL INSECURITY: DOES ANYONE TRY TO KEEP YOU FROM HAVING/CONTACTING OTHER FRIENDS OR DOING THINGS OUTSIDE YOUR HOME?: NO

## 2025-02-02 SDOH — SOCIAL STABILITY: SOCIAL INSECURITY: DO YOU FEEL UNSAFE GOING BACK TO THE PLACE WHERE YOU ARE LIVING?: NO

## 2025-02-02 SDOH — SOCIAL STABILITY: SOCIAL INSECURITY: HAVE YOU HAD ANY THOUGHTS OF HARMING ANYONE ELSE?: NO

## 2025-02-02 SDOH — SOCIAL STABILITY: SOCIAL INSECURITY: ABUSE: ADULT

## 2025-02-02 SDOH — SOCIAL STABILITY: SOCIAL INSECURITY: DO YOU FEEL ANYONE HAS EXPLOITED OR TAKEN ADVANTAGE OF YOU FINANCIALLY OR OF YOUR PERSONAL PROPERTY?: NO

## 2025-02-02 SDOH — SOCIAL STABILITY: SOCIAL INSECURITY: WERE YOU ABLE TO COMPLETE ALL THE BEHAVIORAL HEALTH SCREENINGS?: YES

## 2025-02-02 SDOH — ECONOMIC STABILITY: FOOD INSECURITY: WITHIN THE PAST 12 MONTHS, THE FOOD YOU BOUGHT JUST DIDN'T LAST AND YOU DIDN'T HAVE MONEY TO GET MORE.: NEVER TRUE

## 2025-02-02 ASSESSMENT — ENCOUNTER SYMPTOMS
DIZZINESS: 0
NAUSEA: 0
DYSURIA: 0
CHEST TIGHTNESS: 0
CONSTIPATION: 0
VOMITING: 0
SORE THROAT: 0
BACK PAIN: 1
HEMATURIA: 0
DIAPHORESIS: 0
ABDOMINAL PAIN: 1
JOINT SWELLING: 1
ACTIVITY CHANGE: 0
RHINORRHEA: 0
APPETITE CHANGE: 0
CONFUSION: 0
BLOOD IN STOOL: 0
FEVER: 1
PALPITATIONS: 0
CHILLS: 0
HEADACHES: 0
DIARRHEA: 0
LIGHT-HEADEDNESS: 0
COUGH: 0
WEAKNESS: 0
SHORTNESS OF BREATH: 1
FLANK PAIN: 0

## 2025-02-02 ASSESSMENT — PAIN - FUNCTIONAL ASSESSMENT
PAIN_FUNCTIONAL_ASSESSMENT: 0-10

## 2025-02-02 ASSESSMENT — COGNITIVE AND FUNCTIONAL STATUS - GENERAL
MOVING FROM LYING ON BACK TO SITTING ON SIDE OF FLAT BED WITH BEDRAILS: A LITTLE
HELP NEEDED FOR BATHING: A LITTLE
DRESSING REGULAR LOWER BODY CLOTHING: A LITTLE
CLIMB 3 TO 5 STEPS WITH RAILING: A LOT
TOILETING: A LITTLE
HELP NEEDED FOR BATHING: A LITTLE
MOVING TO AND FROM BED TO CHAIR: A LITTLE
MOBILITY SCORE: 17
MOVING FROM LYING ON BACK TO SITTING ON SIDE OF FLAT BED WITH BEDRAILS: A LITTLE
DRESSING REGULAR UPPER BODY CLOTHING: A LITTLE
TURNING FROM BACK TO SIDE WHILE IN FLAT BAD: A LITTLE
DRESSING REGULAR LOWER BODY CLOTHING: A LITTLE
MOVING TO AND FROM BED TO CHAIR: A LITTLE
DAILY ACTIVITIY SCORE: 20
PATIENT BASELINE BEDBOUND: NO
TURNING FROM BACK TO SIDE WHILE IN FLAT BAD: A LITTLE
DAILY ACTIVITIY SCORE: 20
WALKING IN HOSPITAL ROOM: A LITTLE
CLIMB 3 TO 5 STEPS WITH RAILING: A LOT
STANDING UP FROM CHAIR USING ARMS: A LITTLE
STANDING UP FROM CHAIR USING ARMS: A LITTLE
TOILETING: A LITTLE
DRESSING REGULAR UPPER BODY CLOTHING: A LITTLE
MOBILITY SCORE: 17
WALKING IN HOSPITAL ROOM: A LITTLE

## 2025-02-02 ASSESSMENT — ACTIVITIES OF DAILY LIVING (ADL)
PATIENT'S MEMORY ADEQUATE TO SAFELY COMPLETE DAILY ACTIVITIES?: YES
JUDGMENT_ADEQUATE_SAFELY_COMPLETE_DAILY_ACTIVITIES: YES
HEARING - LEFT EAR: FUNCTIONAL
BATHING: NEEDS ASSISTANCE
GROOMING: NEEDS ASSISTANCE
ASSISTIVE_DEVICE: CANE;EYEGLASSES
WALKS IN HOME: NEEDS ASSISTANCE
LACK_OF_TRANSPORTATION: NO
TOILETING: INDEPENDENT
ADEQUATE_TO_COMPLETE_ADL: YES
FEEDING YOURSELF: INDEPENDENT
DRESSING YOURSELF: NEEDS ASSISTANCE
HEARING - RIGHT EAR: FUNCTIONAL

## 2025-02-02 ASSESSMENT — PAIN SCALES - GENERAL
PAINLEVEL_OUTOF10: 7
PAINLEVEL_OUTOF10: 2
PAINLEVEL_OUTOF10: 7
PAINLEVEL_OUTOF10: 9
PAINLEVEL_OUTOF10: 8
PAINLEVEL_OUTOF10: 5 - MODERATE PAIN
PAINLEVEL_OUTOF10: 3
PAINLEVEL_OUTOF10: 5 - MODERATE PAIN
PAINLEVEL_OUTOF10: 8
PAINLEVEL_OUTOF10: 7
PAINLEVEL_OUTOF10: 2
PAINLEVEL_OUTOF10: 8

## 2025-02-02 ASSESSMENT — PATIENT HEALTH QUESTIONNAIRE - PHQ9
2. FEELING DOWN, DEPRESSED OR HOPELESS: NOT AT ALL
SUM OF ALL RESPONSES TO PHQ9 QUESTIONS 1 & 2: 0
1. LITTLE INTEREST OR PLEASURE IN DOING THINGS: NOT AT ALL

## 2025-02-02 ASSESSMENT — PAIN DESCRIPTION - ORIENTATION: ORIENTATION: RIGHT;LOWER

## 2025-02-02 ASSESSMENT — LIFESTYLE VARIABLES
HOW OFTEN DO YOU HAVE A DRINK CONTAINING ALCOHOL: 2-3 TIMES A WEEK
AUDIT-C TOTAL SCORE: 4
AUDIT-C TOTAL SCORE: 4
SKIP TO QUESTIONS 9-10: 0
HOW MANY STANDARD DRINKS CONTAINING ALCOHOL DO YOU HAVE ON A TYPICAL DAY: 1 OR 2
HOW OFTEN DO YOU HAVE 6 OR MORE DRINKS ON ONE OCCASION: LESS THAN MONTHLY

## 2025-02-02 ASSESSMENT — PAIN DESCRIPTION - PAIN TYPE: TYPE: ACUTE PAIN

## 2025-02-02 ASSESSMENT — PAIN DESCRIPTION - LOCATION
LOCATION: BACK

## 2025-02-02 NOTE — CONSULTS
Wound Care Consult     Visit Date: 2/2/2025      Patient Name: Jaron Olson         MRN: [id number]           YOB: 1960     Reason for Consult: Incision right knee        Wound History: Present on admission     Pertinent Labs:   Albumin   Date Value Ref Range Status   02/02/2025 3.8 3.4 - 5.0 g/dL Final       Wound Assessment:  Wound 01/20/25 Incision Knee Right;Anterior (Active)       Wound Team Summary Assessment: Right knee incision intact, steri strips in place.     Wound Team Plan: Continue Steri strips, will follow as necessary.     Allyson Curry RN  2/2/2025  6:33 PM

## 2025-02-02 NOTE — H&P
"History Of Present Illness  Abhishek Olson \"Nir" is a 64 y.o. male with past medical history of hypertension, hyperlipidemia, and GABRIELA on CPAP, who presents with chest pain.  The patient states he woke up with a constant chest pain at 3 AM yesterday associated with right mid back pain and shortness of breath.  He says the pain was initially located in the middle of his chest and now moved across his chest.  He described the pain as intermittently sharp and dull. He tried Toradol, Percocet, and tramadol without any relief. Pain got worse 1 hour ago prior to arrival, which prompted him to go to the ED for evaluation after a phone call from PCP.  Additionally, he spiked up a fever Tmax of 101.7°F yesterday and complained of right lower abdominal pain. He denies diaphoresis, nausea/vomiting/diarrhea, or URI/urinary symptoms.  Per chart review, he had a right total arthroplasty to treat osteoarthritis on 1/20/2025.  After the surgery, he was discharged home with aspirin but no anticoagulants for DVT prophylaxis.  He states that he has been doing physical therapy sessions daily at home over the last 2 weeks.  He can bear weight on the right leg and walk on it without any difficulty.  He denies any increased pain/swelling or purulent discharge from the surgical incision in the right knee.  He has no prior history of DVT/PTE.  His father had a blood clot in the past thought to be secondary to a surgery.  He denies any family history of clotting disorders.  He had an A-fib in 2007 and was treated with medication but does not recall it.  He has not had any A-fib since that year.     ED course  Vitals: Initial vital signs- temperature 97.9 °F, tachycardia 114, tachypnea 22, /89, SpO2 96% on room air.  Around 7 PM, SpO2 is decreasing to 90% on room air and currently on 2L oxygen which improved his SpO2 to 94%.  He remained hemodynamically stable.  Labs: CBC showed no leukocytosis and normocytic anemia (hemoglobin 13.3 " and hematocrit 40.1).  CMP revealed hyperglycemia 112, hyponatremia 132, hypochloremia 97, elevated BUN 25, and normal creatinine.  Transaminitis (alkaline phosphatase 256, AST 96, ) and hyperbilirubinemia 2.1.  BNP, serial troponins, and lipase were negative.  PT/INR is within normal limits.  EKG: Normal sinus rhythm with 97 bpm, normal axis deviation, no RV strain pattern, and no acute ischemic changes.  Imaging:  -CTA chest: Several segmental pulmonary emboli in the right upper lobe and in the right lower lobe.  Asymmetric enlargement of the right ventricle present favored to be secondary to left ventricular hypertrophy. Patchy groundglass opacities in the medial aspect of the right lower lobe with associated small right-sided pleural effusion concerning for developing pneumonia versus pulmonary infarction.  -CT abdomen/pelvis with IV contrast: Unremarkable liver and gallbladder with no evidence of intrahepatic/intrahepatic biliary dilatation.  No acute intra-abdominal disease.  Intervention:   -Rocephin 2 g empiric for ?PNA  -Tylenol 650 mg, 2X Dilaudid 1 mg IV, 1X morphine 4 mg IV, lidocaine 4%  -Loaded with lovenox 120 mg subcutaneous  -PERT team was consulted for asymmetric right ventricle enlargement seen in CT chest.  Dr. Santos does not believe it is right heart strain and agreeable with lovenox.    Past Medical History  Past Medical History:   Diagnosis Date    Anxiety     Arrhythmia     hx of afib s/p ablation    Depression     Essential (primary) hypertension 05/11/2022    Hypertension    GERD (gastroesophageal reflux disease)     History of blood transfusion     1984    Mixed hyperlipidemia     Elevated cholesterol with high triglycerides    Other specified postprocedural states 06/07/2016    History of colonoscopy    Personal history of other diseases of urinary system     History of kidney problems    Post concussive syndrome     Sleep apnea        Surgical History  Past Surgical History:    Procedure Laterality Date    ABLATION OF DYSRHYTHMIC FOCUS      COLONOSCOPY      HIP ARTHROPLASTY      OTHER SURGICAL HISTORY  05/03/2019    Hernia repair    OTHER SURGICAL HISTORY Left 05/03/2019    Orbital blowout fracture repair    OTHER SURGICAL HISTORY Bilateral 05/03/2019    Hip surgery    SHOULDER SURGERY Bilateral 03/31/2015    Shoulder Surgery        Social History  He reports that he has never smoked. He has never used smokeless tobacco. He reports that he does not currently use alcohol. He reports current drug use. Drug: Marijuana.    Family History  Family History   Problem Relation Name Age of Onset    Multiple myeloma Mother      Other (pulmonary hypertension) Father      Hypertension Brother          Allergies  Penicillins      Current Facility-Administered Medications:     enoxaparin (Lovenox) syringe 120 mg, 1 mg/kg, subcutaneous, q12h, Flaco Josue,     lidocaine 4 % patch 1 patch, 1 patch, transdermal, Once, Flavia Tanner MD, 1 patch at 02/01/25 1818    oxyCODONE (Roxicodone) immediate release tablet 5 mg, 5 mg, oral, q6h PRN, Germania Sheldon, DO    oxygen (O2) therapy, , inhalation, Continuous PRN - O2/gases, Jamie Bagley DO    oxygen (O2) therapy, , inhalation, Continuous - Inhalation, Germania Sheldon, DO, Last Rate: 120,000 mL/hr at 02/02/25 0007, 2 L/min at 02/02/25 0007    Current Outpatient Medications:     acetaminophen (Tylenol Extra Strength) 500 mg tablet, Take 2 tablets (1,000 mg) by mouth every 6 hours if needed for mild pain (1 - 3)., Disp: 240 tablet, Rfl: 0    aspirin 81 mg EC tablet, Take 1 tablet (81 mg) by mouth 2 times a day., Disp: 60 tablet, Rfl: 0    docusate sodium (Colace) 100 mg capsule, Take 1 capsule (100 mg) by mouth 2 times a day., Disp: 60 capsule, Rfl: 0    escitalopram (Lexapro) 20 mg tablet, Take 1 tablet (20 mg) by mouth once daily., Disp: 30 tablet, Rfl: 11    gabapentin (Neurontin) 100 mg capsule, Take 1 capsule (100 mg) by mouth 3 times a day., Disp: ,  Rfl:     hydrOXYzine pamoate (Vistaril) 25 mg capsule, Take 25 mg by mouth every 12 hours., Disp: , Rfl:     indomethacin SR (Indocin SR) 75 mg ER capsule, Take 1 capsule (75 mg) by mouth 2 times a day as needed., Disp: , Rfl:     lisinopriL-hydrochlorothiazide 20-25 mg tablet, Take 1 tablet by mouth once daily., Disp: 30 tablet, Rfl: 11    lisinopriL-hydrochlorothiazide 20-25 mg tablet, TAKE ONE TABLET BY MOUTH DAILY, Disp: 90 tablet, Rfl: 3    lisinopriL-hydrochlorothiazide 20-25 mg tablet, Take 1 tablet by mouth once daily., Disp: 90 tablet, Rfl: 4    meloxicam (Mobic) 15 mg tablet, Take 1 tablet (15 mg) by mouth once daily. Take with food., Disp: , Rfl:     meloxicam (Mobic) 15 mg tablet, Take 1 tablet (15 mg) by mouth once daily., Disp: 30 tablet, Rfl: 0    oxyCODONE (Roxicodone) 5 mg immediate release tablet, Take 1 tablet (5 mg) by mouth every 6 hours if needed for severe pain (7 - 10) for up to 7 days., Disp: 28 tablet, Rfl: 0    pantoprazole (ProtoNix) 40 mg EC tablet, Take 1 tablet (40 mg) by mouth once daily. Do not crush, chew, or split., Disp: 30 tablet, Rfl: 0    polyethylene glycol (Glycolax, Miralax) 17 gram/dose powder, Mix 1 cap (17g) into 8 ounces of fluid., Disp: 238 g, Rfl: 0    rosuvastatin (Crestor) 20 mg tablet, TAKE ONE TABLET BY MOUTH DAILY, Disp: 90 tablet, Rfl: 3    tadalafil (Cialis) 20 mg tablet, Take 1 tablet (20 mg) by mouth 1 time if needed for erectile dysfunction (take 1 hour prior to activity)., Disp: 10 tablet, Rfl: 6    testosterone 20.25 mg/1.25 gram (1.62 %) gel in metered-dose pump, apply 4 pumps topically to the upper arm once daily, Disp: 150 g, Rfl: 0    testosterone 20.25 mg/1.25 gram (1.62 %) gel in metered-dose pump, APPLY 4 PUMPS TO UPPER ARM DAILY FOR LOW TESTOSTERONE, Disp: 150 g, Rfl: 5    traMADol (Ultram) 50 mg tablet, Take 1 tablet (50 mg) by mouth every 6 hours if needed for severe pain (7 - 10) for up to 7 days., Disp: 28 tablet, Rfl: 0    venlafaxine XR  "(Effexor-XR) 75 mg 24 hr capsule, Take 1 capsule (75 mg) by mouth once daily., Disp: , Rfl:     Review of Systems   Constitutional:  Positive for fever. Negative for activity change, appetite change, chills and diaphoresis.   HENT:  Negative for congestion, rhinorrhea and sore throat.    Eyes:  Negative for visual disturbance.   Respiratory:  Positive for shortness of breath. Negative for cough and chest tightness.    Cardiovascular:  Positive for chest pain and leg swelling. Negative for palpitations.   Gastrointestinal:  Positive for abdominal pain. Negative for blood in stool, constipation, diarrhea, nausea and vomiting.   Genitourinary:  Negative for dysuria, flank pain, hematuria and urgency.   Musculoskeletal:  Positive for back pain and joint swelling.   Neurological:  Negative for dizziness, syncope, weakness, light-headedness and headaches.   Psychiatric/Behavioral:  Negative for confusion.         Last Recorded Vitals  Blood pressure 138/73, pulse 88, temperature 36 °C (96.8 °F), temperature source Temporal, resp. rate 20, height 1.88 m (6' 2\"), weight 118 kg (260 lb), SpO2 96%.    Physical Exam  Vitals and nursing note reviewed.   Constitutional:       General: He is not in acute distress.     Appearance: He is not diaphoretic.   Eyes:      General: No scleral icterus.     Extraocular Movements: Extraocular movements intact.      Conjunctiva/sclera: Conjunctivae normal.   Cardiovascular:      Rate and Rhythm: Normal rate and regular rhythm.      Pulses: Normal pulses.      Heart sounds: Normal heart sounds. No murmur heard.     Comments: Trace pitting edema of BLE  Pulmonary:      Effort: Pulmonary effort is normal. No respiratory distress.      Breath sounds: Normal breath sounds. No wheezing, rhonchi or rales.   Abdominal:      General: There is no distension.      Palpations: Abdomen is soft.      Tenderness: There is no abdominal tenderness.   Musculoskeletal:      Cervical back: Neck supple.      " Right knee: Tenderness present.      Right lower leg: Edema present.      Left lower leg: Edema present.      Comments: Surgical incision in the right knee s/p total arthroplasty is intact and with surrounding erythema but not significantly warm to palpation and there is no active purulent discharge seen. He can flex the knee but is limited by pain. He can extend the knee fully.   Skin:     General: Skin is warm and dry.   Neurological:      Mental Status: He is alert and oriented to person, place, and time.          Pertinent Lab Results  Results for orders placed or performed during the hospital encounter of 02/01/25 (from the past 24 hours)   CBC with Differential   Result Value Ref Range    WBC 11.1 4.4 - 11.3 x10*3/uL    nRBC 0.0 0.0 - 0.0 /100 WBCs    RBC 4.16 (L) 4.50 - 5.90 x10*6/uL    Hemoglobin 13.3 (L) 13.5 - 17.5 g/dL    Hematocrit 40.1 (L) 41.0 - 52.0 %    MCV 96 80 - 100 fL    MCH 32.0 26.0 - 34.0 pg    MCHC 33.2 32.0 - 36.0 g/dL    RDW 13.0 11.5 - 14.5 %    Platelets 412 150 - 450 x10*3/uL    Neutrophils % 73.0 40.0 - 80.0 %    Immature Granulocytes %, Automated 0.3 0.0 - 0.9 %    Lymphocytes % 12.4 13.0 - 44.0 %    Monocytes % 11.1 2.0 - 10.0 %    Eosinophils % 3.0 0.0 - 6.0 %    Basophils % 0.2 0.0 - 2.0 %    Neutrophils Absolute 8.10 (H) 1.20 - 7.70 x10*3/uL    Immature Granulocytes Absolute, Automated 0.03 0.00 - 0.70 x10*3/uL    Lymphocytes Absolute 1.37 1.20 - 4.80 x10*3/uL    Monocytes Absolute 1.23 (H) 0.10 - 1.00 x10*3/uL    Eosinophils Absolute 0.33 0.00 - 0.70 x10*3/uL    Basophils Absolute 0.02 0.00 - 0.10 x10*3/uL   Comprehensive Metabolic Panel   Result Value Ref Range    Glucose 112 (H) 74 - 99 mg/dL    Sodium 132 (L) 136 - 145 mmol/L    Potassium 4.0 3.5 - 5.3 mmol/L    Chloride 97 (L) 98 - 107 mmol/L    Bicarbonate 27 21 - 32 mmol/L    Anion Gap 12 10 - 20 mmol/L    Urea Nitrogen 25 (H) 6 - 23 mg/dL    Creatinine 0.90 0.50 - 1.30 mg/dL    eGFR >90 >60 mL/min/1.73m*2    Calcium 9.6 8.6  - 10.3 mg/dL    Albumin 4.2 3.4 - 5.0 g/dL    Alkaline Phosphatase 256 (H) 33 - 136 U/L    Total Protein 7.8 6.4 - 8.2 g/dL    AST 96 (H) 9 - 39 U/L    Bilirubin, Total 2.1 (H) 0.0 - 1.2 mg/dL     (H) 10 - 52 U/L   Brain Natriuretic Peptide   Result Value Ref Range    BNP 10 0 - 99 pg/mL   Protime-INR   Result Value Ref Range    Protime 12.5 9.8 - 12.8 seconds    INR 1.1 0.9 - 1.1   Troponin I, High Sensitivity, Initial   Result Value Ref Range    Troponin I, High Sensitivity 8 0 - 20 ng/L   Lipase   Result Value Ref Range    Lipase 21 9 - 82 U/L   Troponin, High Sensitivity, 1 Hour   Result Value Ref Range    Troponin I, High Sensitivity 7 0 - 20 ng/L       Imaging  CT abdomen pelvis w IV contrast    Result Date: 2/1/2025  Interpreted By:  Doug Ewing, STUDY: CT ABDOMEN PELVIS W IV CONTRAST;  2/1/2025 6:38 pm   INDICATION: Signs/Symptoms:rule out cholecystitis, pancreatitis.     COMPARISON: None.   ACCESSION NUMBER(S): [id number]   ORDERING CLINICIAN: JEANCARLOS BRAUN   TECHNIQUE: Contiguous axial images of the abdomen and pelvis were obtained after the intravenous administration of 75 mL of Omnipaque 350 iodinated contrast. Coronal and sagittal reformatted images were reconstructed from the axial data.   FINDINGS: LOWER CHEST: Please refer to separately dictated same day CT PE for further characterization of the thoracic findings.     ABDOMEN/PELVIS:   ABDOMINAL WALL: Cutaneous tissues of the abdomen and pelvis do not demonstrate any acute abnormality. Small fat containing umbilical hernia is present.   LIVER: No acute hepatic parenchymal abnormality is present. Portal vein is unremarkable in appearance.   BILE DUCTS: No intrahepatic or extrahepatic biliary dilatation is present.   GALLBLADDER: No gallbladder wall thickening or pericholecystic stranding is identified.   PANCREAS: No pancreatic ductal dilatation or peripancreatic stranding is present.   SPLEEN: No acute splenic abnormalities are  identified.   ADRENALS: Bilateral adrenal glands are unremarkable.   KIDNEYS, URETERS, BLADDER: Kidneys are symmetric in size and enhancement without evidence of hydronephrosis or radiopaque nephrolithiasis. Several hypodensities bilaterally likely represent small cysts.   Visualized upper ureters are not dilated.   Partially visualized bladder is unremarkable in appearance.   REPRODUCTIVE ORGANS: Prostate is not well visualized due to beam hardening artifact from bilateral hip replacements.   VESSELS: Atherosclerotic plaques are present in the abdominal aorta without evidence of acute vascular abnormality.   RETROPERITONEUM/LYMPH NODES: No acute retroperitoneal abnormality. No enlarged lymph nodes.   BOWEL/MESENTERY/PERITONEUM: Numerous diverticula are present in the descending and sigmoid colon without evidence of acute diverticulitis. No inflammatory bowel wall thickening or dilatation. Normal appendix.   No ascites, free air, or fluid collection.     MUSCULOSKELETAL: Multilevel degenerative changes are present in the lumbar spine, with severe intervertebral disc height loss at L4-L5 and L5-S1.   Patient is status post bilateral hip arthroplasties. No acute osseous abnormality is identified in the pelvis.       1. No acute abnormality is identified in the abdomen or pelvis. There is no evidence of cholecystitis or pancreatitis. 2. Numerous diverticula are present in the descending and sigmoid colon without evidence of acute diverticulitis.   MACRO: None.   Signed by: Doug Ewing 2/1/2025 7:17 PM Dictation workstation:   [id number]    CT angio chest for pulmonary embolism    Result Date: 2/1/2025  Interpreted By:  Doug Ewing, STUDY: CT ANGIO CHEST FOR PULMONARY EMBOLISM;  2/1/2025 6:38 pm   INDICATION: Signs/Symptoms:just had knee replaced, SOB.     COMPARISON: None   ACCESSION NUMBER(S): [id number]   ORDERING CLINICIAN: JEANCARLOS BRAUN   TECHNIQUE: Helical data acquisition of the chest was  obtained after intravenous administration of 75 mL Omnipaque 350, as per PE protocol. Images were reformatted in coronal and sagittal planes. Axial and coronal maximum intensity projection (MIP) images were created and reviewed.   FINDINGS: POTENTIAL LIMITATIONS OF THE STUDY: Images are somewhat degraded by motion.   HEART AND VESSELS: No large central pulmonary embolism is present although several subtle segmental filling defects are present in the smaller arteries supplying the right upper lobe (series 401, image 121). Additionally, small segmental emboli are also suspected in the right lower lobe (series 401, image 181).   Main pulmonary artery is not enlarged.   The thoracic aorta normal in course and caliber.Minimal vascular calcifications are present in the thoracic aorta.Although, the study is not tailored for evaluation of aorta, there is no definite evidence of acute aortic pathology. Mild coronary artery calcifications are seen. Please note,the study is not optimized for evaluation of coronary arteries.   The cardiac chambers are not enlarged.There is somewhat asymmetric enlargement of the right atrium compared to the left ventricle, likely due to left ventricular hypertrophy. There is no pericardial effusion seen.   MEDIASTINUM AND KEVIN, LOWER NECK AND AXILLA: The visualized thyroid gland is within normal limits. No evidence of thoracic lymphadenopathy by CT criteria. Esophagus appears within normal limits as seen.   LUNGS AND AIRWAYS: The trachea and central airways are patent. No endobronchial lesion is seen.   Volume loss in the atelectasis is present in the lower lobes, right-greater-than-left, with a small right-sided pleural effusion. Additionally some ground-glass/consolidative opacities are present in the medial aspect of the right lower lobe (series 401, image 211).   UPPER ABDOMEN: Please refer to separately dictated same day CT of the abdomen and pelvis for further characterization of the  abdominal findings.   CHEST WALL AND OSSEOUS STRUCTURES: Chest wall is within normal limits. No acute osseous pathology.There are no suspicious osseous lesions.Multilevel degenerative changes are present in the thoracic spine without evidence of compression fracture or high-grade stenosis.       1. Several segmental pulmonary emboli are present in the right upper lobe, with several additional suspected in the in the right lower lobe. 2. Somewhat asymmetric enlargement of the right ventricle is present compared to the contralateral left, favored to be secondary to left ventricular hypertrophy. Correlate with echocardiogram and ejection fraction. 3. Volume loss/atelectasis is present in the lower lobes bilaterally, with some patchy and ground-glass opacities noted in the medial aspect of the right lower lobe with the associated small right-sided pleural effusion, possibly representing changes of developing pneumonia versus pulmonary infarction. Correlate with physical exam.   MACRO: None   Signed by: Doug Ewing 2/1/2025 7:11 PM Dictation workstation:   [id number]    XR chest 1 view    Result Date: 2/1/2025  STUDY: Chest Radiograph;  2/1/2025 4:40 PM INDICATION: Chest pain, unspecified. COMPARISON: XR chest 2/13/2024 ACCESSION NUMBER(S): [id number] ORDERING CLINICIAN: JEANCARLOS BRAUN TECHNIQUE:  Frontal chest was obtained at 16:39 hours. FINDINGS: CARDIOMEDIASTINAL SILHOUETTE: Cardiomediastinal silhouette is normal in size and configuration.  LUNGS: Lungs are clear.  ABDOMEN: No remarkable upper abdominal findings.  BONES: No acute osseous changes.    No acute pulmonary pathology. Signed by Kamron Ortez MD     Assessment/Plan   This is a 64 y.o. male with recent right knee total arthroplasty not on DVT prophylaxis outpatient presented her with pleuritic chest pain and right sided mid back pain associated with fever and shortness of breath. He was found to have acute segmental PE in the right upper and  lower lobes likely provoked by recent surgery as well as transaminitis with hyperbilirubinemia.    # Low risk acute segmental PE 2/2 recent surgery  -Had a right knee total arthroplasty on 1/20/2025 and noted to be not on DVT prophylaxis on discharge. Given timing, it is likely that the major surgery may have provoked him to have a PE. It is also possible that the testosterone supplementation is a contributing factor of him being in a hypercoagulable state.   -CTA Chest revealed multiple segmental pulmonary emboli in the right upper and lower lung lobes. BNP and serial troponins were negative. EKG showed NSR with no RV strain pattern. PERT Team was consulted by ED physician regarding the asymmetric right ventricular enlargement on imaging and Dr. Santos does not believe it is truly a right heart strain. Pt was given lovenox 1 mg/kg subQ in the ED, which PERT Team is agreeable to. On admission, he is hemodynamically stable and is satting well on 2L oxygen.   -Regarding the imaging, it also showed patchy groundglass opacities in the medial aspect of the right lower lobe with small right-sided pleural effusion which is likely pulmonary infarction rather than PNA. Fever is likely reactive to PE as well. He has no leukocytosis and cough. Low suspicion for PNA. No indication for antibiotics at this time.   Plan:   -Already received a dose of lovenox 1 mg/kg in the ED. Will continue regimen tomorrow.   -TTE to further assess ventricular function  -Vascular duplex BLE to rule out DVT   -Hold off antibiotics therapy   -Wean off oxygen as tolerated    # Transaminitis with hyperbilirubinemia   -Patient is asymptomatic. Comparing to prior labs, liver enzymes and total bilirubin uptrended (alkaline phosphatase 256, AST 96, , and total bilirubin 2.1). CT imaging showed unremarkable liver and gallbladder with no intrahepatic/extrahepatic biliary dilatation. PT/INR within normal limits. No jaundice, scleral icterus, and RUQ  pain noted on exam. Lipid panel from 2/2024 showed hypercholesterolemia 225, HDL 51.3, elevated , and hypertriglyceridemia 169. DDx: metabolic associated, infections, or drug-induced (statin).  -In terms of the hyperbilirubinemia plus elevated BUN, it is likely reactive to current acute PE or recent right knee surgery.  Plan:  -Obtain repeat lipid panel, hepatitis panel, and GGT   -Avoid hepatotoxic medications; pending med recs   -Trend liver enzymes and total bilirubin. If he becomes symptomatic and uptrended, may consider RUQ US to further evaluate etiology; defer to day team     # Right knee pain s/p total arthroplasty   -No overt signs of acute infectious process on exam. Can flex knee and extend knee but somewhat limited by pain. Low suspicion for cellulitis and septic arthritis at this time.   Plan:   -PT/OT   -Wound care   -On pain control PRN oxycodone    Chronic conditions:   Hypertension  Hyperlipidemia  GABRIELA on CPAP  -Pending med recs   -On telemetry    Fluid: As needed  Electrolytes: Replete with goal K>4 and Mg>2  DVT prophylaxis: Lovenox  Diet: Carb controlled  Bowel regimen: MiraLAX PRN  CODE STATUS: Full  Disposition: Anticipate at least 1-2 midnight stays, pending clinical improvement    Emergency Contact: Extended Emergency Contact Information  Primary Emergency Contact: NATANAELMESSI  Address: [address] [place] [address]  Mobile Phone: [phone]  Relation: Spouse  Preferred language: English   needed? No  Secondary Emergency Contact: catarina hernandez   [facility]  Mobile Phone: [phone]  Relation: None  Preferred language: English   needed? No    Patient was seen.  To be staffed with attending.  Germania Sheldon, DO  Internal Medicine PGY-1  Date: February 2, 2025    Senior Addendum  I saw and evaluated patient independently of the intern, oversaw all aspects of the work-up and to be discussed with  attending physician.    64-year-old male with past medical history of HTN, HLD, GABRIELA on CPAP presented to the ED with chest pain.  He recently had right TKA on 01/20/2025 and discharged home with aspirin twice daily for VTE prophylaxis.  In the ED, he was hemodynamically stable and satting on room but tachycardic and tachypneic.  CBC unremarkable, CMP showed elevated , , AST 96.  Troponin x 2 negative, BNP within normal limits.  CT PE obtained which showed several segmental pulmonary emboli in the right upper lobe and right lower lobe and asymmetric enlargement of the RV compared to contralateral LV.  Given CT findings, PERT team was contacted and no further intervention recommended.  Started on therapeutic Lovenox.  My impression is 64-year-old male with past medical history listed above admitted for multiple low risk submassive segmental PE, likely provoked in the setting of recent surgery.  Will obtain TTE to further assess RV and LVEF, and Doppler US BLE to complete VTE workup.  Will continue therapeutic Lovenox overnight, with goal to transition to DOAC following completion of imaging studies. Regarding transaminitis, patient asymptomatic with benign abdominal exam and CT A/P negative for any acute findings; thus will trend LFTs and obtain RUQ US if labs worsen or patient endorses new onset abd pain.    Flaco Josue DO  PGY-3 Internal Medicine

## 2025-02-02 NOTE — CARE PLAN
Problem: Pain - Adult  Goal: Verbalizes/displays adequate comfort level or baseline comfort level  2/2/2025 1850 by Bekah Mason RN  Outcome: Progressing  2/2/2025 1401 by Bekah Mason RN  Flowsheets (Taken 2/2/2025 1401)  Verbalizes/displays adequate comfort level or baseline comfort level:   Encourage patient to monitor pain and request assistance   Assess pain using appropriate pain scale     Problem: Safety - Adult  Goal: Free from fall injury  Outcome: Progressing     Problem: Discharge Planning  Goal: Discharge to home or other facility with appropriate resources  Outcome: Progressing     Problem: Chronic Conditions and Co-morbidities  Goal: Patient's chronic conditions and co-morbidity symptoms are monitored and maintained or improved  Outcome: Progressing     Problem: Nutrition  Goal: Nutrient intake appropriate for maintaining nutritional needs  Outcome: Progressing     Problem: Fall/Injury  Goal: Not fall by end of shift  Outcome: Progressing  Goal: Be free from injury by end of the shift  Outcome: Progressing  Goal: Verbalize understanding of personal risk factors for fall in the hospital  Outcome: Progressing  Goal: Verbalize understanding of risk factor reduction measures to prevent injury from fall in the home  Outcome: Progressing     Problem: Pain  Goal: Takes deep breaths with improved pain control throughout the shift  Outcome: Progressing  Goal: Turns in bed with improved pain control throughout the shift  Outcome: Progressing  Goal: Walks with improved pain control throughout the shift  Outcome: Progressing     Problem: Skin  Goal: Participates in plan/prevention/treatment measures  2/2/2025 1850 by Bekah Mason RN  Outcome: Progressing  2/2/2025 1401 by Bekah Mason RN  Flowsheets (Taken 2/2/2025 1401)  Participates in plan/prevention/treatment measures: Elevate heels  Goal: Prevent/manage excess moisture  Outcome: Progressing  Goal: Prevent/minimize sheer/friction  injuries  2/2/2025 1850 by Bekah Mason RN  Outcome: Progressing  2/2/2025 1401 by Bekah Mason RN  Flowsheets (Taken 2/2/2025 1401)  Prevent/minimize sheer/friction injuries: Turn/reposition every 2 hours/use positioning/transfer devices       1315- Patient arrived on the unit. Admission and assessment done.     Four Eye Skin Check completed by Bekah Mason RN and Cee Wilkes RN.   Patient skin intact other than his right knee incision that is closed and has steri strips over it.     EOS- No acute changes since arriving on the unit. Family at bedside this evening. Pain medication given twice with decrease in pain. Safety maintained and call light within reach.

## 2025-02-02 NOTE — Clinical Note
"PULMONARY EMBOLISM RESPONSE TEAM (PERT) NOTE    This note represents a summary of a virtual evaluation by the pulmonary embolism response team requested by  ***.  Suggestions and impression are summarized from the initial virtual encounter and discussion with the referring medical team. These suggestions supplement but should not be a substitute for bedside evaluation and management by the attending of record.     PERT Members on the Call:  Critical Care Attending:  ***  [facility] Interventional Cardiology Attending:  ***  Vascular Medicine Attending:  ***  CICU Attending:   ***  Critical Care Fellow:  ***  CICU Fellow:  ***  Cardiology Interventional Fellow:  ***    Brief Clinical Summary:  Abhishek Olson \"Nir" is a 64 y.o. male presenting to [facility] conference for evaluation of Class II PESI Risk, ELIAS Stage 1 pulmonary embolism.    In brief, as communicated by requesting provider, index presentation 01/20/2025 unilateral primary osteoarthritis s/p total knee artrhoplasty without complication. 02/01/2025 - OSH ED presentation. Back pain radiating to chest, dyspnea with exertion, denies snycope, lower extremity swelling     In regards to co-morbidities, communicated by requesting provider, [patient has history of  GABRIELA CPAP compliant. Otherwise, reportedly, denies history of Heart failure, Cancer , Clotting disorder, History of VTE.    On exam, Vitals:***  HR 90-100s, sinus   -150/60-70s   RR tachypneic: teens to 30s likely 2/2 back pain   Oxygen saturation: Mild hypoxemia low 90s, RA   Temperature: afebrile     Biomarkers: Troponin 10 to 7, BNP 10 iso class I low risk obesity, Lactate - none.     With respect to imaging, CT Chest Pulmonary Embolism demonstrated several segmental pulmonary emboli in right upper lobe with several in right lower lobe. RV Strain: none. RV/LV ratio: < 0.9. Venous duplex ultrasound of lower extremities: none. Limited surface echocardiogram: none. Formal surface " "echocardiogram: none.   PESI Severity Score***.   Current intervention includes***.     2. Somewhat asymmetric enlargement of the right ventricle is present  compared to the contralateral left, favored to be secondary to left  ventricular hypertrophy. Correlate with echocardiogram and ejection  fraction.    As communicated by requesting provider, there appears to be [bold below]  ***history of stroke of ***unknown origin or hemorrhagic stroke   ***history of ischemic stroke within past 6 months  ***major trauma,surgery, or head injury within previous 3 weeks   ***active hemorrhage or bleeding diathesis   Or [relative]   ***TIA in previous 6 months   ***refractory HTN SBP > 180   ***infective endocarditis   ***Oral anticoagulation  ***Active peptic ulcer disease  ***Advanced liver disease   ***Traumatic resuscitation   ***Non-compressible puncture sites  ***Pregnancy or first post-partum week     Vital Signs  BP (!) 144/96   Pulse 90   Temp 36.6 °C (97.9 °F) (Temporal)   Resp (!) 26   Ht 1.88 m (6' 2\")   Wt 118 kg (260 lb)   SpO2 (!) 93%   BMI 33.38 kg/m²      Laboratory  Recent Labs     02/01/25  1659 02/01/25  1608   TROPHS 7 8   BNP  --  10       PESI Score:  ***, consistent with {JLPESIRISK:39764:::1} PE.    CT Scan reviewed:  Clot burden ***, present in ***  RV/LV ratio *** by CT scan    TTE reviewed (if available):  RV size and function:  RV/LV ratio *** by TTE (if applicable):  Intracardiac thrombus visualized:  Patent foramen ovale:    Venous duplex (if available):    Additional findings: ***    Contraindications to anticoagulation:  Contraindications to thrombolytics:    Initial Impressions:  ERS/ESC Pulmonary Embolism Risk Category: {JLPECLASS:08450:::1}.  ***    Initial Suggestions/Plans:  Admit to *** unit at *** facility under ***.  Initial therapy suggested: ***.  Additional testing recommended: ***  Consults suggested: ***.  Additional suggestions for re-evaluation/reconference or retesting: " ***.    To re conference the PERT team please call the  Transfer Center at [phone].

## 2025-02-02 NOTE — PROGRESS NOTES
"ID:  Abhishek Olson \"Nir" is a 64 y.o. male on hospital day 0 of admission presenting with Other acute pulmonary embolism, unspecified whether acute cor pulmonale present (Multi).    Subjective   The patient seen and examined this morning at bedside.  Patient was in acute distress due to pain in his back at the right side corresponding with the findings of the CT showing evidence of possible pulmonary infarction.  The pain increased with deep breath.  Otherwise, the patient denied fever, chills, lightheadedness, dizziness, chest pain, shortness of breath (aside from being in pain and not able to take deep breaths), abdominal pain, change in bowel habits, or urinary symptoms.  He was asking for additional pain medication that can help him control his pain.  He was educated about expectations for pain management and the duration the pain would last.        Objective     Visit Vitals  BP (!) 151/97 (BP Location: Right arm, Patient Position: Lying)   Pulse 93   Temp 36.5 °C (97.7 °F) (Tympanic)   Resp 19   Ht 1.88 m (6' 2\")   Wt 118 kg (260 lb)   SpO2 98%   BMI 33.38 kg/m²   Smoking Status Never   BSA 2.48 m²        Physical Exam  Constitutional:       General: He is in acute distress (Secondary to pain in his back, right side).      Appearance: Normal appearance. He is normal weight.   HENT:      Head: Normocephalic and atraumatic.      Right Ear: External ear normal.      Left Ear: External ear normal.      Nose: Nose normal. No congestion or rhinorrhea.      Mouth/Throat:      Mouth: Mucous membranes are moist.      Pharynx: Oropharynx is clear. No posterior oropharyngeal erythema.   Eyes:      General:         Right eye: No discharge.         Left eye: No discharge.      Extraocular Movements: Extraocular movements intact.      Conjunctiva/sclera: Conjunctivae normal.      Pupils: Pupils are equal, round, and reactive to light.   Cardiovascular:      Rate and Rhythm: Normal rate and regular rhythm.      Pulses: " Normal pulses.      Heart sounds: Normal heart sounds. No murmur heard.  Pulmonary:      Effort: Respiratory distress present.      Breath sounds: Normal breath sounds. No wheezing, rhonchi or rales.   Chest:      Chest wall: No tenderness.   Abdominal:      General: Abdomen is flat. Bowel sounds are normal. There is no distension.      Palpations: Abdomen is soft.      Tenderness: There is abdominal tenderness (Right-sided abdominal tenderness). There is no guarding.   Musculoskeletal:         General: Swelling (Mild swelling of right knee) present. Normal range of motion.      Cervical back: Normal range of motion and neck supple. No rigidity or tenderness.        Back:       Right lower le+ Edema (Mild right lower extremity swelling) present.        Legs:       Comments: Clean surgical scar can be noted over the right knee s/p right total knee arthroplasty   Skin:     General: Skin is warm and dry.   Neurological:      General: No focal deficit present.      Mental Status: He is alert and oriented to person, place, and time.   Psychiatric:         Mood and Affect: Mood normal.         Behavior: Behavior normal.       Laboratory Data:    Results from last 7 days   Lab Units 25  0444 25  1608   WBC AUTO x10*3/uL 10.8 11.1   RBC AUTO x10*6/uL 3.64* 4.16*   HEMOGLOBIN g/dL 11.7* 13.3*     Results from last 7 days   Lab Units 25  0444 25  1608   SODIUM mmol/L 134* 132*   POTASSIUM mmol/L 4.0 4.0   CHLORIDE mmol/L 95* 97*   CO2 mmol/L 29 27   BUN mg/dL 22 25*   CREATININE mg/dL 0.93 0.90   CALCIUM mg/dL 9.1 9.6   PHOSPHORUS mg/dL 3.8  --    MAGNESIUM mg/dL 1.97  --    BILIRUBIN TOTAL mg/dL 1.6* 2.1*   ALT U/L 131* 139*   AST U/L 89* 96*       Imaging:  CT abdomen pelvis w IV contrast    Result Date: 2025  Interpreted By:  Doug Ewing, STUDY: CT ABDOMEN PELVIS W IV CONTRAST;  2025 6:38 pm   INDICATION: Signs/Symptoms:rule out cholecystitis, pancreatitis.     COMPARISON:  None.   ACCESSION NUMBER(S): [id number]   ORDERING CLINICIAN: JEANCARLOS BRAUN   TECHNIQUE: Contiguous axial images of the abdomen and pelvis were obtained after the intravenous administration of 75 mL of Omnipaque 350 iodinated contrast. Coronal and sagittal reformatted images were reconstructed from the axial data.   FINDINGS: LOWER CHEST: Please refer to separately dictated same day CT PE for further characterization of the thoracic findings.     ABDOMEN/PELVIS:   ABDOMINAL WALL: Cutaneous tissues of the abdomen and pelvis do not demonstrate any acute abnormality. Small fat containing umbilical hernia is present.   LIVER: No acute hepatic parenchymal abnormality is present. Portal vein is unremarkable in appearance.   BILE DUCTS: No intrahepatic or extrahepatic biliary dilatation is present.   GALLBLADDER: No gallbladder wall thickening or pericholecystic stranding is identified.   PANCREAS: No pancreatic ductal dilatation or peripancreatic stranding is present.   SPLEEN: No acute splenic abnormalities are identified.   ADRENALS: Bilateral adrenal glands are unremarkable.   KIDNEYS, URETERS, BLADDER: Kidneys are symmetric in size and enhancement without evidence of hydronephrosis or radiopaque nephrolithiasis. Several hypodensities bilaterally likely represent small cysts.   Visualized upper ureters are not dilated.   Partially visualized bladder is unremarkable in appearance.   REPRODUCTIVE ORGANS: Prostate is not well visualized due to beam hardening artifact from bilateral hip replacements.   VESSELS: Atherosclerotic plaques are present in the abdominal aorta without evidence of acute vascular abnormality.   RETROPERITONEUM/LYMPH NODES: No acute retroperitoneal abnormality. No enlarged lymph nodes.   BOWEL/MESENTERY/PERITONEUM: Numerous diverticula are present in the descending and sigmoid colon without evidence of acute diverticulitis. No inflammatory bowel wall thickening or dilatation. Normal appendix.   No  ascites, free air, or fluid collection.     MUSCULOSKELETAL: Multilevel degenerative changes are present in the lumbar spine, with severe intervertebral disc height loss at L4-L5 and L5-S1.   Patient is status post bilateral hip arthroplasties. No acute osseous abnormality is identified in the pelvis.       1. No acute abnormality is identified in the abdomen or pelvis. There is no evidence of cholecystitis or pancreatitis. 2. Numerous diverticula are present in the descending and sigmoid colon without evidence of acute diverticulitis.   MACRO: None.   Signed by: Doug Ewing 2/1/2025 7:17 PM Dictation workstation:   [id number]    CT angio chest for pulmonary embolism    Result Date: 2/1/2025  Interpreted By:  Doug Ewing, STUDY: CT ANGIO CHEST FOR PULMONARY EMBOLISM;  2/1/2025 6:38 pm   INDICATION: Signs/Symptoms:just had knee replaced, SOB.     COMPARISON: None   ACCESSION NUMBER(S): [id number]   ORDERING CLINICIAN: JEANCARLOS BRAUN   TECHNIQUE: Helical data acquisition of the chest was obtained after intravenous administration of 75 mL Omnipaque 350, as per PE protocol. Images were reformatted in coronal and sagittal planes. Axial and coronal maximum intensity projection (MIP) images were created and reviewed.   FINDINGS: POTENTIAL LIMITATIONS OF THE STUDY: Images are somewhat degraded by motion.   HEART AND VESSELS: No large central pulmonary embolism is present although several subtle segmental filling defects are present in the smaller arteries supplying the right upper lobe (series 401, image 121). Additionally, small segmental emboli are also suspected in the right lower lobe (series 401, image 181).   Main pulmonary artery is not enlarged.   The thoracic aorta normal in course and caliber.Minimal vascular calcifications are present in the thoracic aorta.Although, the study is not tailored for evaluation of aorta, there is no definite evidence of acute aortic pathology. Mild coronary  artery calcifications are seen. Please note,the study is not optimized for evaluation of coronary arteries.   The cardiac chambers are not enlarged.There is somewhat asymmetric enlargement of the right atrium compared to the left ventricle, likely due to left ventricular hypertrophy. There is no pericardial effusion seen.   MEDIASTINUM AND KEVIN, LOWER NECK AND AXILLA: The visualized thyroid gland is within normal limits. No evidence of thoracic lymphadenopathy by CT criteria. Esophagus appears within normal limits as seen.   LUNGS AND AIRWAYS: The trachea and central airways are patent. No endobronchial lesion is seen.   Volume loss in the atelectasis is present in the lower lobes, right-greater-than-left, with a small right-sided pleural effusion. Additionally some ground-glass/consolidative opacities are present in the medial aspect of the right lower lobe (series 401, image 211).   UPPER ABDOMEN: Please refer to separately dictated same day CT of the abdomen and pelvis for further characterization of the abdominal findings.   CHEST WALL AND OSSEOUS STRUCTURES: Chest wall is within normal limits. No acute osseous pathology.There are no suspicious osseous lesions.Multilevel degenerative changes are present in the thoracic spine without evidence of compression fracture or high-grade stenosis.       1. Several segmental pulmonary emboli are present in the right upper lobe, with several additional suspected in the in the right lower lobe. 2. Somewhat asymmetric enlargement of the right ventricle is present compared to the contralateral left, favored to be secondary to left ventricular hypertrophy. Correlate with echocardiogram and ejection fraction. 3. Volume loss/atelectasis is present in the lower lobes bilaterally, with some patchy and ground-glass opacities noted in the medial aspect of the right lower lobe with the associated small right-sided pleural effusion, possibly representing changes of developing  pneumonia versus pulmonary infarction. Correlate with physical exam.   MACRO: None   Signed by: Doug Ewing 2/1/2025 7:11 PM Dictation workstation:   [id number]    XR chest 1 view    Result Date: 2/1/2025  STUDY: Chest Radiograph;  2/1/2025 4:40 PM INDICATION: Chest pain, unspecified. COMPARISON: XR chest 2/13/2024 ACCESSION NUMBER(S): [id number] ORDERING CLINICIAN: JEANCARLOS BRAUN TECHNIQUE:  Frontal chest was obtained at 16:39 hours. FINDINGS: CARDIOMEDIASTINAL SILHOUETTE: Cardiomediastinal silhouette is normal in size and configuration.  LUNGS: Lungs are clear.  ABDOMEN: No remarkable upper abdominal findings.  BONES: No acute osseous changes.    No acute pulmonary pathology. Signed by Kamron Ortez MD      Medications:  Scheduled medications  enoxaparin, 1 mg/kg, subcutaneous, q12h  oxygen, , inhalation, Continuous - Inhalation      Continuous medications     PRN medications  PRN medications: HYDROmorphone, HYDROmorphone, oxygen, polyethylene glycol    Assessment    Assessment & Plan   Mr. Abhishek Olson is a 64 y.o. male who presents with acute chest pain and shortness of breath and was found to have acute segmental pulmonary embolism, unprovoked.  Most likely secondary to recent knee surgery.  Patient was on DVT prophylaxis in the form of aspirin 81 mg twice daily.  Currently lower extremity duplex ultrasound is pending to rule out DVT.  Patient was also found to have transaminitis which could be related to the acute pulmonary embolism.  Hepatitis panel as well as GGT are ordered.  Will follow-up on the results.  Assessment & Plan  Other acute pulmonary embolism, unspecified whether acute cor pulmonale present (Multi)         # Acute segmental PE 2/2 recent surgery  # Pleurisy  -Current presentation most likely provoked by recent surgery although patient was on DVT prophylaxis in the form of aspirin 81 mg twice daily.  -CT imaging showing groundglass opacities and pleural effusion together with  the patient's complaint of sharp pain on the right side of the back that gets aggravated by deep inspiration.  Suspicion for pulmonary infarction complicated with pleurisy.  -Patient received a dose of lovenox 1 mg/kg in the ED.   Plan  -Will transition patient from Lovenox to Eliquis.  -Aspirin dose reduced to 81 mg once daily due to increased risk of bleeding when combined with Eliquis at a dose greater than 100 mg.  -TTE to further assess ventricular function.  Results pending  -Vascular duplex BLE to rule out DVT.  Results pending  -Hold off antibiotics therapy   -Wean off oxygen as tolerated  -Given the patient severe pain we will initiate pain management in the form of Dilaudid 0.5 mg for moderate pain every 4 as needed and Dilaudid 1 mg for severe pain every 4 hours as needed.    # Transaminitis  -Lipid panel came back normal    Plan  -Hepatitis panel came back negative  -GGT elevated (519)  -Will continue monitoring at this time with no further intervention.    # Right knee pain s/p total arthroplasty   -No overt signs of acute infectious process on exam. Can flex knee and extend knee but somewhat limited by pain. Low suspicion for cellulitis and septic arthritis at this time.     Plan:   -PT/OT   -Wound care   -On pain control as stated above        Chronic conditions:   Hypertension  Hyperlipidemia  GABRIELA on CPAP  Continue home med as appropriate    Global Plan of Care  Fluid: PRN  Electrolytes: PRN  Nutrition: Regular  DVT Prophylaxis: Eliquis, therapeutic dose for pulmonary embolism.  GI Prophylaxis: None  Code Status: Full Code     Emergency Contact: Extended Emergency Contact Information  Primary Emergency Contact: MESSI HERNANDEZ  Address: [address] [place] [address]  Mobile Phone: [phone]  Relation: Spouse  Preferred language: English   needed? No  Secondary Emergency Contact: catarina hernandez   [facility]  Mobile Phone:  [phone]  Relation: None  Preferred language: English   needed? No     Patient seen and discussed with the attending.  Signature: EMILY DIAZ MD, PGY I Internal medicine  Date: February 2, 2025   This note has been transcribed using Dragon voice recognition system and there is a possibility of unintentional typing misprints.  Any information found to be copied from previous providers is done in the best interest of the patient to provide accurate, quality, and continuity of care.

## 2025-02-02 NOTE — ED PROVIDER NOTES
Pt Name: Abhishek Olson  MRN: [id number]  Birthdate 1960  Date of evaluation: 2/1/2025  Provider: Flavia Tanner MD    CHIEF COMPLAINT       Chief Complaint   Patient presents with    Chest Pain    Fever     Pt states he started having chest pain around 0300 today. Pt states the pain started in the back and radiates to the chest. Pt states he had a total knee in December 20th, 2024. Pt takes aspirin daily, family history of blood clots     HISTORY OF PRESENT ILLNESS    Abhishek Olson is a 64 y.o. year old male who presents to the ER for chest pain.  The patient reports that he had this chest pain since 3 AM today, and it originates from his lower back and wraps around his ribs the right side.  The patient describes the pain as sharp and stabbing, nothing has helped his pain.  He reports worsening pain on movement and taking deep breaths.  Patient also reports a fever today, Tmax of 101.7 °F.  He took Tylenol at noon and then Percocet and tramadol at 3 PM.  The patient denies any URI symptoms, shortness of breath, abdominal pain, changes in bowel or bladder habits.  He reports knee pain however it is not new, he has been going through physical therapy.  Patient denies any recent falls, trauma to the chest or the back, or car accidents.  Patient is not on any blood thinners except baby aspirin.    PMH is significant for knee replacement on 12/20/2024, hypertension, anxiety, GABRIELA, migraines.  Family history is significant for paternal death from MI at age 41.  His father also had a blood clot after surgery.     PAST MEDICAL HISTORY     Past Medical History:   Diagnosis Date    Anxiety     Arrhythmia     hx of afib s/p ablation    Depression     Essential (primary) hypertension 05/11/2022    Hypertension    GERD (gastroesophageal reflux disease)     History of blood transfusion     1984    Mixed hyperlipidemia     Elevated cholesterol with high triglycerides    Other specified postprocedural states 06/07/2016     History of colonoscopy    Personal history of other diseases of urinary system     History of kidney problems    Post concussive syndrome     Sleep apnea      CURRENT MEDICATIONS       Previous Medications    ACETAMINOPHEN (TYLENOL EXTRA STRENGTH) 500 MG TABLET    Take 2 tablets (1,000 mg) by mouth every 6 hours if needed for mild pain (1 - 3).    ASPIRIN 81 MG EC TABLET    Take 1 tablet (81 mg) by mouth 2 times a day.    DOCUSATE SODIUM (COLACE) 100 MG CAPSULE    Take 1 capsule (100 mg) by mouth 2 times a day.    ESCITALOPRAM (LEXAPRO) 20 MG TABLET    Take 1 tablet (20 mg) by mouth once daily.    GABAPENTIN (NEURONTIN) 100 MG CAPSULE    Take 1 capsule (100 mg) by mouth 3 times a day.    HYDROXYZINE PAMOATE (VISTARIL) 25 MG CAPSULE    Take 25 mg by mouth every 12 hours.    INDOMETHACIN SR (INDOCIN SR) 75 MG ER CAPSULE    Take 1 capsule (75 mg) by mouth 2 times a day as needed.    LISINOPRIL-HYDROCHLOROTHIAZIDE 20-25 MG TABLET    Take 1 tablet by mouth once daily.    LISINOPRIL-HYDROCHLOROTHIAZIDE 20-25 MG TABLET    TAKE ONE TABLET BY MOUTH DAILY    LISINOPRIL-HYDROCHLOROTHIAZIDE 20-25 MG TABLET    Take 1 tablet by mouth once daily.    MELOXICAM (MOBIC) 15 MG TABLET    Take 1 tablet (15 mg) by mouth once daily. Take with food.    MELOXICAM (MOBIC) 15 MG TABLET    Take 1 tablet (15 mg) by mouth once daily.    OXYCODONE (ROXICODONE) 5 MG IMMEDIATE RELEASE TABLET    Take 1 tablet (5 mg) by mouth every 6 hours if needed for severe pain (7 - 10) for up to 7 days.    PANTOPRAZOLE (PROTONIX) 40 MG EC TABLET    Take 1 tablet (40 mg) by mouth once daily. Do not crush, chew, or split.    POLYETHYLENE GLYCOL (GLYCOLAX, MIRALAX) 17 GRAM/DOSE POWDER    Mix 1 cap (17g) into 8 ounces of fluid.    ROSUVASTATIN (CRESTOR) 20 MG TABLET    TAKE ONE TABLET BY MOUTH DAILY    TADALAFIL (CIALIS) 20 MG TABLET    Take 1 tablet (20 mg) by mouth 1 time if needed for erectile dysfunction (take 1 hour prior to activity).    TESTOSTERONE 20.25  MG/1.25 GRAM (1.62 %) GEL IN METERED-DOSE PUMP    apply 4 pumps topically to the upper arm once daily    TESTOSTERONE 20.25 MG/1.25 GRAM (1.62 %) GEL IN METERED-DOSE PUMP    APPLY 4 PUMPS TO UPPER ARM DAILY FOR LOW TESTOSTERONE    TRAMADOL (ULTRAM) 50 MG TABLET    Take 1 tablet (50 mg) by mouth every 6 hours if needed for severe pain (7 - 10) for up to 7 days.    VENLAFAXINE XR (EFFEXOR-XR) 75 MG 24 HR CAPSULE    Take 1 capsule (75 mg) by mouth once daily.     SURGICAL HISTORY       Past Surgical History:   Procedure Laterality Date    ABLATION OF DYSRHYTHMIC FOCUS      COLONOSCOPY      HIP ARTHROPLASTY      OTHER SURGICAL HISTORY  05/03/2019    Hernia repair    OTHER SURGICAL HISTORY Left 05/03/2019    Orbital blowout fracture repair    OTHER SURGICAL HISTORY Bilateral 05/03/2019    Hip surgery    SHOULDER SURGERY Bilateral 03/31/2015    Shoulder Surgery     ALLERGIES     Penicillins  FAMILY HISTORY       Family History   Problem Relation Name Age of Onset    Multiple myeloma Mother      Other (pulmonary hypertension) Father      Hypertension Brother       SOCIAL HISTORY       Social History     Tobacco Use    Smoking status: Never    Smokeless tobacco: Never   Vaping Use    Vaping status: Never Used   Substance Use Topics    Alcohol use: Not Currently    Drug use: Yes     Types: Marijuana     PHYSICAL EXAM  (up to 7 for level 4, 8 or more for level 5)     ED Triage Vitals [02/01/25 1558]   Temperature Heart Rate Respirations BP   36.6 °C (97.9 °F) (!) 114 (!) 22 142/89      Pulse Ox Temp Source Heart Rate Source Patient Position   96 % Temporal Monitor Sitting      BP Location FiO2 (%)     Right arm --       Physical Exam  Constitutional:       General: He is in acute distress.      Appearance: He is obese. He is not toxic-appearing.   HENT:      Head: Normocephalic and atraumatic.   Eyes:      Extraocular Movements: Extraocular movements intact.      Pupils: Pupils are equal, round, and reactive to light.    Cardiovascular:      Rate and Rhythm: Regular rhythm. Tachycardia present.      Heart sounds: Normal heart sounds. No murmur heard.  Pulmonary:      Effort: Pulmonary effort is normal. No tachypnea or respiratory distress.      Breath sounds: Normal breath sounds.   Chest:      Comments: Tenderness to the right chest with palpation.  Abdominal:      Palpations: Abdomen is soft.      Tenderness: There is abdominal tenderness in the right upper quadrant.   Musculoskeletal:      Comments: Incision down the right knee, no dehiscence, no purulent discharge, incision held together by Steri-Strips.   Skin:     General: Skin is warm and dry.      Capillary Refill: Capillary refill takes less than 2 seconds.   Neurological:      Mental Status: He is alert.        DIAGNOSTIC RESULTS   LABS:  Labs Reviewed   CBC WITH AUTO DIFFERENTIAL - Abnormal       Result Value    WBC 11.1      nRBC 0.0      RBC 4.16 (*)     Hemoglobin 13.3 (*)     Hematocrit 40.1 (*)     MCV 96      MCH 32.0      MCHC 33.2      RDW 13.0      Platelets 412      Neutrophils % 73.0      Immature Granulocytes %, Automated 0.3      Lymphocytes % 12.4      Monocytes % 11.1      Eosinophils % 3.0      Basophils % 0.2      Neutrophils Absolute 8.10 (*)     Immature Granulocytes Absolute, Automated 0.03      Lymphocytes Absolute 1.37      Monocytes Absolute 1.23 (*)     Eosinophils Absolute 0.33      Basophils Absolute 0.02     COMPREHENSIVE METABOLIC PANEL - Abnormal    Glucose 112 (*)     Sodium 132 (*)     Potassium 4.0      Chloride 97 (*)     Bicarbonate 27      Anion Gap 12      Urea Nitrogen 25 (*)     Creatinine 0.90      eGFR >90      Calcium 9.6      Albumin 4.2      Alkaline Phosphatase 256 (*)     Total Protein 7.8      AST 96 (*)     Bilirubin, Total 2.1 (*)      (*)    B-TYPE NATRIURETIC PEPTIDE - Normal    BNP 10      Narrative:        <100 pg/mL - Heart failure unlikely  100-299 pg/mL - Intermediate probability of acute heart                   failure exacerbation. Correlate with clinical                  context and patient history.    >=300 pg/mL - Heart Failure likely. Correlate with clinical                  context and patient history.    BNP testing is performed using different testing methodology at [facility] than at other [facility]. Direct result comparisons should only be made within the same method.      PROTIME-INR - Normal    Protime 12.5      INR 1.1     SERIAL TROPONIN-INITIAL - Normal    Troponin I, High Sensitivity 8      Narrative:     Less than 99th percentile of normal range cutoff-  Female and children under 18 years old <14 ng/L; Male <21 ng/L: Negative  Repeat testing should be performed if clinically indicated.     Female and children under 18 years old 14-50 ng/L; Male 21-50 ng/L:  Consistent with possible cardiac damage and possible increased clinical   risk. Serial measurements may help to assess extent of myocardial damage.     >50 ng/L: Consistent with cardiac damage, increased clinical risk and  myocardial infarction. Serial measurements may help assess extent of   myocardial damage.      NOTE: Children less than 1 year old may have higher baseline troponin   levels and results should be interpreted in conjunction with the overall   clinical context.     NOTE: Troponin I testing is performed using a different   testing methodology at [facility] than at other   [facility]. Direct result comparisons should only   be made within the same method.   SERIAL TROPONIN, 1 HOUR - Normal    Troponin I, High Sensitivity 7      Narrative:     Less than 99th percentile of normal range cutoff-  Female and children under 18 years old <14 ng/L; Male <21 ng/L: Negative  Repeat testing should be performed if clinically indicated.     Female and children under 18 years old 14-50 ng/L; Male 21-50 ng/L:  Consistent with possible cardiac damage and possible increased clinical   risk. Serial measurements  may help to assess extent of myocardial damage.     >50 ng/L: Consistent with cardiac damage, increased clinical risk and  myocardial infarction. Serial measurements may help assess extent of   myocardial damage.      NOTE: Children less than 1 year old may have higher baseline troponin   levels and results should be interpreted in conjunction with the overall   clinical context.     NOTE: Troponin I testing is performed using a different   testing methodology at [facility] than at other   [facility]. Direct result comparisons should only   be made within the same method.   LIPASE - Normal    Lipase 21      Narrative:     Venipuncture immediately after or during the administration of Metamizole may lead to falsely low results. Testing should be performed immediately prior to Metamizole dosing.   TROPONIN SERIES- (INITIAL, 1 HR)    Narrative:     The following orders were created for panel order Troponin I Series, High Sensitivity (0, 1 HR).  Procedure                               Abnormality         Status                     ---------                               -----------         ------                     Troponin I, High Sensiti...[[id number]]  Normal              Final result               Troponin, High Sensitivi...[[id number]]  Normal              Final result                 Please view results for these tests on the individual orders.     All other labs were within normal range or not returned as of this dictation.  Imaging  CT angio chest for pulmonary embolism   Final Result   1. Several segmental pulmonary emboli are present in the right upper   lobe, with several additional suspected in the in the right lower   lobe.   2. Somewhat asymmetric enlargement of the right ventricle is present   compared to the contralateral left, favored to be secondary to left   ventricular hypertrophy. Correlate with echocardiogram and ejection   fraction.   3. Volume loss/atelectasis is present in the  lower lobes bilaterally,   with some patchy and ground-glass opacities noted in the medial   aspect of the right lower lobe with the associated small right-sided   pleural effusion, possibly representing changes of developing   pneumonia versus pulmonary infarction. Correlate with physical exam.        MACRO:   None        Signed by: Doug Ewing 2/1/2025 7:11 PM   Dictation workstation:   [id number]      CT abdomen pelvis w IV contrast   Final Result   1. No acute abnormality is identified in the abdomen or pelvis. There   is no evidence of cholecystitis or pancreatitis.   2. Numerous diverticula are present in the descending and sigmoid   colon without evidence of acute diverticulitis.        MACRO:   None.        Signed by: Doug Ewing 2/1/2025 7:17 PM   Dictation workstation:   [id number]      XR chest 1 view   Final Result   No acute pulmonary pathology.   Signed by Kamron Ortez MD         Procedure  Procedures  EMERGENCY DEPARTMENT COURSE/MDM:   Medical Decision Making    Vitals:    Vitals:    02/01/25 2030 02/01/25 2045 02/01/25 2100 02/01/25 2130   BP:   (!) 144/96 138/78   BP Location:       Patient Position:       Pulse: 97 87 90 91   Resp: (!) 30 13 (!) 26 19   Temp:       TempSrc:       SpO2: 94% (!) 92% (!) 93% 96%   Weight:       Height:         Abhishek Olson is a male 64 y.o. who presents to the ER for chest pain. On arrival the patients vital signs were: Afebrile, Tachycardic, and Normotensive. History obtained from: patient and Spouse.     The differential diagnoses include MI, costochondritis, gastritis, pancreatitis, cholecystitis.    Physical exam significant for right lumbar pain on palpation of the paravertebral muscles.  There is no midline tenderness to the cervical thoracic or lumbar spine.  There is tenderness to palpation on the right ribs, wrapping to the front chest.  No pain in the left chest.  Aguilar sign is positive, the abdomen is soft to palpation.   Tenderness in the right upper quadrant to palpation.  There is voluntary guarding.  The heart is tachycardic but in regular rhythm.  Lungs are clear to auscultation.  The right knee has an incision over it, no dehiscence or purulent discharge coming from the incision.  It is held together by Steri-Strips.  Some erythema and edema on the right knee but not tender to palpation.    Independent assessment of the labs, CBC is within normal limits, slight anemia however patient had recent surgery.  No evidence of acute infection.  CMP did not show  evidence of PAPI.  However there is evidence of some liver dysfunction with  AST 96, bilirubin 2.1.  Troponins are within normal limits, BNP was 10, PT/INR within normal limits.    Chest x-ray is within normal limits, no acute cardiopulmonary processes.  CT of the abdomen pelvis did not show any acute processes, it did show severe intervertebral disc height loss at L4-L5 and L5-S1.  CTA showed several segmental pulmonary emboli are present in the right upper lobe, with several additional suspected in the in the right lower lobe. There is some asymmetric enlargement of the right atrium compared to the left ventricle, likely due to left ventricular hypertrophy. No document of R heart strain.  There is some small consolidations in the right lower lobes, possible pneumonia.    The patient was given morphine, Dilaudid, lidocaine patch for his back pain.  He was given Lovenox for his PE.  He was given azithromycin and Rocephin for his possible pneumonia.     He is admitted for his PE with cardiac workup, pain management and pneumonia.    ED Course as of 02/01/25 2233   Sat Feb 01, 2025 1950 Several segmental pulmonary emboli are present in the right upper  lobe, with several additional suspected in the in the right lower lobe   [GV]      ED Course User Index  [GV] Flavia Tanner MD         Diagnoses as of 02/01/25 2233   Other acute pulmonary embolism, unspecified whether  acute cor pulmonale present (Multi)   Chest pain, unspecified type   Pneumonia due to infectious organism, unspecified laterality, unspecified part of lung   Acute low back pain without sciatica, unspecified back pain laterality       Consultant discussions: PERT was consulted after medicine requested due to the patient's CTA results.  PERT did not believe that the patient had any right heart strain from an obstructing thrombus, did not believe that the patient had a saddle emboli due to the patient's stable vital signs and negative CT imaging.  Recommended continuing anticoagulation and cardiac workup.    External Records Reviewed: I reviewed recent and relevant outside records including inpatient notes, outpatient records      Shared decision making for disposition  Patient and/or patient´s representative was counseled regarding labs, imaging, likely diagnosis. All questions were answered. Recommendation was made   for Admission given the need for further escalation of care to inpatient management. Patient agreed and was admitted in stable condition. Admitting team was notified of any pending labs or imaging to ensure continuity of care.     ED Medications administered this visit:    Medications   lidocaine 4 % patch 1 patch (1 patch transdermal Medication Applied 2/1/25 1818)   morphine injection 4 mg (4 mg intravenous Given 2/1/25 1641)   acetaminophen (Tylenol) tablet 650 mg (650 mg oral Given 2/1/25 1644)   iohexol (OMNIPaque) 350 mg iodine/mL solution 75 mL (75 mL intravenous Given 2/1/25 1828)   HYDROmorphone (Dilaudid) injection 1 mg (1 mg intravenous Given 2/1/25 1819)   enoxaparin (Lovenox) syringe 120 mg (120 mg subcutaneous Given 2/1/25 2030)   cefTRIAXone (Rocephin) 2 g in sodium chloride 0.9% IV 50 mL (0 g intravenous Stopped 2/1/25 2043)   azithromycin (Zithromax) 500 mg in dextrose 5%  mL (0 mg intravenous Stopped 2/1/25 2155)   HYDROmorphone (Dilaudid) injection 1 mg (1 mg intravenous Given  25)       New Prescriptions from this visit:    New Prescriptions    No medications on file       Follow-up:  No follow-up provider specified.      Final Impression:   1. Other acute pulmonary embolism, unspecified whether acute cor pulmonale present (Multi)    2. Chest pain, unspecified type    3. Pneumonia due to infectious organism, unspecified laterality, unspecified part of lung    4. Acute low back pain without sciatica, unspecified back pain laterality          Please excuse any misspellings or unintended errors related to the Dragon speech recognition software used to dictate this note.    I reviewed the case with the attending ED physician. The attending ED physician agrees with the plan.      Flavia Tanner MD  Resident  25 1645        Medical Decision Making  =================Attending note===============    The patient was seen by the resident/fellow.  I have personally performed a substantive portion of the encounter.  I have seen and examined the patient; agree with the workup, evaluation, MDM,   management and diagnosis.  The care plan has been discussed with the resident; I have reviewed the resident's note and agree with the documented findings.      This is a 64 y.o. male who presents to ER with right-sided chest/back pain that started around 3 AM.  He states he had a Tmax at home of 101.7.  He has been trying to take Tylenol and Percocet and tramadol.  He is having shortness of breath.  Pain is worse with inspiration.  No vomiting.    Patient recent right total knee replacement on .  He is never had any blood clots.    His father  at age 41 from coronary artery disease.  He also had blood clots.  Patient has a history of hypertension, sleep apnea, anxiety, migraines.  He is not a smoker.  No diabetes or hyperlipidemia.  He is never had a heart catheterization.  He has had A-fib in the past.    Heart regular.  Lungs clear.  Abdomen soft.  There is no significant  anterior abdominal tenderness.  Patient points to his right posterior lateral rib area as the area of pain.  Just above the CVA area.  Patient does have difficult time finding a position of comfort.    Concern is kidney stone versus pulmonary embolism primarily.  Gallbladder pathology also possibility.    Patient does have some mild elevation of his liver function tests that were not seen previously.  Lipase normal.  Troponin negative.  Repeat troponin negative.  BNP normal.  Chemistry is slightly low sodium.  White count normal.  No significant anemia.  CT:  1. Several segmental pulmonary emboli are present in the right upper  lobe, with several additional suspected in the in the right lower  lobe.  2. Somewhat asymmetric enlargement of the right ventricle is present  compared to the contralateral left, favored to be secondary to left  ventricular hypertrophy. Correlate with echocardiogram and ejection  fraction.  3. Volume loss/atelectasis is present in the lower lobes bilaterally,  with some patchy and ground-glass opacities noted in the medial  aspect of the right lower lobe with the associated small right-sided  pleural effusion, possibly representing changes of developing  pneumonia versus pulmonary infarction. Correlate with physical exam.    1. No acute abnormality is identified in the abdomen or pelvis. There  is no evidence of cholecystitis or pancreatitis.  2. Numerous diverticula are present in the descending and sigmoid  colon without evidence of acute diverticulitis.    Patient started anticoagulants.  Since patient did have a fever we will also cover with antibiotics.    EKG: Sinus tachycardia with a rate of 110.  .  QTc 468.  There are some artifact on the baseline    Repeat EKG: Normal sinus rhythm with a ventricular rate of 97.  .  QTc 462.  No ST changes.    Patient to be admitted for further evaluation.    ==========================================          Procedure  Procedures      Flavia Tanner MD  Resident  02/02/25 0120       Jamie Bagley DO  02/02/25 6820

## 2025-02-03 ENCOUNTER — APPOINTMENT (OUTPATIENT)
Dept: CARDIOLOGY | Facility: HOSPITAL | Age: 65
DRG: 300 | End: 2025-02-03
Payer: COMMERCIAL

## 2025-02-03 ENCOUNTER — APPOINTMENT (OUTPATIENT)
Dept: RADIOLOGY | Facility: HOSPITAL | Age: 65
DRG: 300 | End: 2025-02-03
Payer: COMMERCIAL

## 2025-02-03 LAB
ALBUMIN SERPL BCP-MCNC: 3.8 G/DL (ref 3.4–5)
ALP SERPL-CCNC: 245 U/L (ref 33–136)
ALT SERPL W P-5'-P-CCNC: 182 U/L (ref 10–52)
ANION GAP SERPL CALC-SCNC: 11 MMOL/L (ref 10–20)
AST SERPL W P-5'-P-CCNC: 117 U/L (ref 9–39)
ATRIAL RATE: 110 BPM
ATRIAL RATE: 97 BPM
BILIRUB SERPL-MCNC: 1.7 MG/DL (ref 0–1.2)
BUN SERPL-MCNC: 17 MG/DL (ref 6–23)
CALCIUM SERPL-MCNC: 9.4 MG/DL (ref 8.6–10.3)
CHLORIDE SERPL-SCNC: 92 MMOL/L (ref 98–107)
CO2 SERPL-SCNC: 33 MMOL/L (ref 21–32)
CREAT SERPL-MCNC: 0.71 MG/DL (ref 0.5–1.3)
EGFRCR SERPLBLD CKD-EPI 2021: >90 ML/MIN/1.73M*2
EJECTION FRACTION APICAL 4 CHAMBER: 58.3
EJECTION FRACTION: 63 %
ERYTHROCYTE [DISTWIDTH] IN BLOOD BY AUTOMATED COUNT: 13 % (ref 11.5–14.5)
GLUCOSE SERPL-MCNC: 105 MG/DL (ref 74–99)
HCT VFR BLD AUTO: 34.3 % (ref 41–52)
HGB BLD-MCNC: 11 G/DL (ref 13.5–17.5)
LEFT ATRIUM VOLUME AREA LENGTH INDEX BSA: 30.5 ML/M2
LEFT VENTRICLE INTERNAL DIMENSION DIASTOLE: 4 CM (ref 3.5–6)
LV EJECTION FRACTION BIPLANE: 59 %
MAGNESIUM SERPL-MCNC: 2.02 MG/DL (ref 1.6–2.4)
MCH RBC QN AUTO: 31.1 PG (ref 26–34)
MCHC RBC AUTO-ENTMCNC: 32.1 G/DL (ref 32–36)
MCV RBC AUTO: 97 FL (ref 80–100)
MITRAL VALVE E/A RATIO: 0.87
NRBC BLD-RTO: 0 /100 WBCS (ref 0–0)
P AXIS: 56 DEGREES
P AXIS: 67 DEGREES
P OFFSET: 188 MS
P OFFSET: 193 MS
P ONSET: 135 MS
P ONSET: 137 MS
PLATELET # BLD AUTO: 379 X10*3/UL (ref 150–450)
POTASSIUM SERPL-SCNC: 3.9 MMOL/L (ref 3.5–5.3)
PR INTERVAL: 164 MS
PR INTERVAL: 170 MS
PROT SERPL-MCNC: 7.1 G/DL (ref 6.4–8.2)
Q ONSET: 219 MS
Q ONSET: 220 MS
QRS COUNT: 16 BEATS
QRS COUNT: 18 BEATS
QRS DURATION: 80 MS
QRS DURATION: 82 MS
QT INTERVAL: 346 MS
QT INTERVAL: 364 MS
QTC CALCULATION(BAZETT): 462 MS
QTC CALCULATION(BAZETT): 468 MS
QTC FREDERICIA: 423 MS
QTC FREDERICIA: 427 MS
R AXIS: 33 DEGREES
R AXIS: 34 DEGREES
RBC # BLD AUTO: 3.54 X10*6/UL (ref 4.5–5.9)
RIGHT VENTRICLE FREE WALL PEAK S': 19.1 CM/S
RIGHT VENTRICLE PEAK SYSTOLIC PRESSURE: 18.4 MMHG
SODIUM SERPL-SCNC: 132 MMOL/L (ref 136–145)
T AXIS: 25 DEGREES
T AXIS: 25 DEGREES
T OFFSET: 393 MS
T OFFSET: 401 MS
TRICUSPID ANNULAR PLANE SYSTOLIC EXCURSION: 3.2 CM
VENTRICULAR RATE: 110 BPM
VENTRICULAR RATE: 97 BPM
WBC # BLD AUTO: 8.3 X10*3/UL (ref 4.4–11.3)

## 2025-02-03 PROCEDURE — 2500000005 HC RX 250 GENERAL PHARMACY W/O HCPCS

## 2025-02-03 PROCEDURE — 97165 OT EVAL LOW COMPLEX 30 MIN: CPT | Mod: GO

## 2025-02-03 PROCEDURE — 93308 TTE F-UP OR LMTD: CPT

## 2025-02-03 PROCEDURE — 97161 PT EVAL LOW COMPLEX 20 MIN: CPT | Mod: GP

## 2025-02-03 PROCEDURE — 1200000002 HC GENERAL ROOM WITH TELEMETRY DAILY

## 2025-02-03 PROCEDURE — 2500000002 HC RX 250 W HCPCS SELF ADMINISTERED DRUGS (ALT 637 FOR MEDICARE OP, ALT 636 FOR OP/ED)

## 2025-02-03 PROCEDURE — 93308 TTE F-UP OR LMTD: CPT | Performed by: INTERNAL MEDICINE

## 2025-02-03 PROCEDURE — 83735 ASSAY OF MAGNESIUM: CPT

## 2025-02-03 PROCEDURE — 99232 SBSQ HOSP IP/OBS MODERATE 35: CPT | Performed by: STUDENT IN AN ORGANIZED HEALTH CARE EDUCATION/TRAINING PROGRAM

## 2025-02-03 PROCEDURE — 2500000001 HC RX 250 WO HCPCS SELF ADMINISTERED DRUGS (ALT 637 FOR MEDICARE OP)

## 2025-02-03 PROCEDURE — 2500000004 HC RX 250 GENERAL PHARMACY W/ HCPCS (ALT 636 FOR OP/ED): Mod: JZ

## 2025-02-03 PROCEDURE — 93970 EXTREMITY STUDY: CPT

## 2025-02-03 PROCEDURE — 93970 EXTREMITY STUDY: CPT | Performed by: SURGERY

## 2025-02-03 PROCEDURE — 97116 GAIT TRAINING THERAPY: CPT | Mod: GP

## 2025-02-03 PROCEDURE — 85027 COMPLETE CBC AUTOMATED: CPT

## 2025-02-03 PROCEDURE — 84075 ASSAY ALKALINE PHOSPHATASE: CPT

## 2025-02-03 PROCEDURE — 2500000004 HC RX 250 GENERAL PHARMACY W/ HCPCS (ALT 636 FOR OP/ED)

## 2025-02-03 PROCEDURE — 36415 COLL VENOUS BLD VENIPUNCTURE: CPT

## 2025-02-03 RX ORDER — OXYCODONE AND ACETAMINOPHEN 5; 325 MG/1; MG/1
2 TABLET ORAL EVERY 6 HOURS PRN
Status: DISCONTINUED | OUTPATIENT
Start: 2025-02-03 | End: 2025-02-04 | Stop reason: HOSPADM

## 2025-02-03 RX ORDER — OXYCODONE AND ACETAMINOPHEN 5; 325 MG/1; MG/1
1 TABLET ORAL EVERY 6 HOURS PRN
Status: DISCONTINUED | OUTPATIENT
Start: 2025-02-03 | End: 2025-02-04 | Stop reason: HOSPADM

## 2025-02-03 RX ADMIN — POLYETHYLENE GLYCOL 3350 17 G: 17 POWDER, FOR SOLUTION ORAL at 10:03

## 2025-02-03 RX ADMIN — APIXABAN 10 MG: 5 TABLET, FILM COATED ORAL at 21:16

## 2025-02-03 RX ADMIN — HYDROMORPHONE HYDROCHLORIDE 1 MG: 1 INJECTION, SOLUTION INTRAMUSCULAR; INTRAVENOUS; SUBCUTANEOUS at 02:35

## 2025-02-03 RX ADMIN — HYDROMORPHONE HYDROCHLORIDE 1 MG: 1 INJECTION, SOLUTION INTRAMUSCULAR; INTRAVENOUS; SUBCUTANEOUS at 14:46

## 2025-02-03 RX ADMIN — Medication 2 L/MIN: at 09:00

## 2025-02-03 RX ADMIN — GABAPENTIN 100 MG: 100 CAPSULE ORAL at 09:55

## 2025-02-03 RX ADMIN — HYDROCHLOROTHIAZIDE: 25 TABLET ORAL at 09:54

## 2025-02-03 RX ADMIN — DOCUSATE SODIUM 100 MG: 100 CAPSULE, LIQUID FILLED ORAL at 09:55

## 2025-02-03 RX ADMIN — HYDROMORPHONE HYDROCHLORIDE 1 MG: 1 INJECTION, SOLUTION INTRAMUSCULAR; INTRAVENOUS; SUBCUTANEOUS at 10:43

## 2025-02-03 RX ADMIN — GABAPENTIN 100 MG: 100 CAPSULE ORAL at 14:46

## 2025-02-03 RX ADMIN — VENLAFAXINE HYDROCHLORIDE 75 MG: 75 CAPSULE, EXTENDED RELEASE ORAL at 09:54

## 2025-02-03 RX ADMIN — ESCITALOPRAM OXALATE 20 MG: 20 TABLET ORAL at 09:55

## 2025-02-03 RX ADMIN — GABAPENTIN 100 MG: 100 CAPSULE ORAL at 21:16

## 2025-02-03 RX ADMIN — DOCUSATE SODIUM 100 MG: 100 CAPSULE, LIQUID FILLED ORAL at 21:16

## 2025-02-03 RX ADMIN — PANTOPRAZOLE SODIUM 40 MG: 40 TABLET, DELAYED RELEASE ORAL at 09:56

## 2025-02-03 RX ADMIN — APIXABAN 10 MG: 5 TABLET, FILM COATED ORAL at 09:56

## 2025-02-03 RX ADMIN — HYDROMORPHONE HYDROCHLORIDE 1 MG: 1 INJECTION, SOLUTION INTRAMUSCULAR; INTRAVENOUS; SUBCUTANEOUS at 06:38

## 2025-02-03 RX ADMIN — HYDROMORPHONE HYDROCHLORIDE 0.5 MG: 1 INJECTION, SOLUTION INTRAMUSCULAR; INTRAVENOUS; SUBCUTANEOUS at 19:29

## 2025-02-03 RX ADMIN — ROSUVASTATIN CALCIUM 20 MG: 20 TABLET, FILM COATED ORAL at 09:54

## 2025-02-03 RX ADMIN — OXYCODONE HYDROCHLORIDE AND ACETAMINOPHEN 1 TABLET: 5; 325 TABLET ORAL at 17:34

## 2025-02-03 SDOH — HEALTH STABILITY: PHYSICAL HEALTH: EXERCISE TYPE: SUPINE, SEATED AND STANDING TKR EX'S

## 2025-02-03 SDOH — HEALTH STABILITY: PHYSICAL HEALTH: EXERCISE COMMENTS: PT WITH LIMITED FOLLOW THROUGH WITH HEP FOR ROM AND STRENGTHENING.

## 2025-02-03 ASSESSMENT — COGNITIVE AND FUNCTIONAL STATUS - GENERAL
STANDING UP FROM CHAIR USING ARMS: A LITTLE
WALKING IN HOSPITAL ROOM: A LITTLE
DRESSING REGULAR UPPER BODY CLOTHING: A LITTLE
STANDING UP FROM CHAIR USING ARMS: A LITTLE
MOVING TO AND FROM BED TO CHAIR: A LITTLE
MOBILITY SCORE: 17
MOVING TO AND FROM BED TO CHAIR: A LITTLE
WALKING IN HOSPITAL ROOM: A LITTLE
TURNING FROM BACK TO SIDE WHILE IN FLAT BAD: A LITTLE
MOVING FROM LYING ON BACK TO SITTING ON SIDE OF FLAT BED WITH BEDRAILS: A LITTLE
DRESSING REGULAR LOWER BODY CLOTHING: A LITTLE
CLIMB 3 TO 5 STEPS WITH RAILING: A LOT
TOILETING: A LITTLE
CLIMB 3 TO 5 STEPS WITH RAILING: A LOT
DAILY ACTIVITIY SCORE: 24
DAILY ACTIVITIY SCORE: 20
HELP NEEDED FOR BATHING: A LITTLE
MOBILITY SCORE: 19

## 2025-02-03 ASSESSMENT — PAIN DESCRIPTION - ORIENTATION
ORIENTATION: RIGHT;LOWER;MID
ORIENTATION: RIGHT;LOWER
ORIENTATION: RIGHT

## 2025-02-03 ASSESSMENT — ENCOUNTER SYMPTOMS
PERSON REPORTING PAIN: PATIENT
PAIN LOCATION: RIGHT KNEE
PAIN SEVERITY GOAL: 0/10
PAIN LOCATION - PAIN DURATION: VARIABLE
PAIN LOCATION - RELIEVING FACTORS: REST, ICE, MEDS
PAIN: 1
HIGHEST PAIN SEVERITY IN PAST 24 HOURS: 3/10
MUSCLE WEAKNESS: 1
PAIN LOCATION - EXACERBATING FACTORS: ROM, EXERCISE
PAIN LOCATION - PAIN QUALITY: ACHING
LOWEST PAIN SEVERITY IN PAST 24 HOURS: 0/10
PAIN LOCATION - PAIN SEVERITY: 3/10
PAIN LOCATION - PAIN FREQUENCY: INTERMITTENT
LIMITED RANGE OF MOTION: 1
SUBJECTIVE PAIN PROGRESSION: GRADUALLY IMPROVING

## 2025-02-03 ASSESSMENT — GAIT ASSESSMENTS
BALANCE AND GAIT SCORE: 23
STEP CONTINUITY: 1 - STEPS APPEAR CONTINUOUS
TRUNK: 0 - MARKED SWAY OR USES WALKING AID
PATH SCORE: 1
PATH: 1 - MILD/MODERATE DEVIATION OR USES WALKING AID
STEP SYMMETRY: 1 - RIGHT AND LEFT STEP LENGTH APPEAR EQUAL
WALKING STANCE: 1 - HEELS ALMOST TOUCHING WHILE WALKING
GAIT SCORE: 9
INITIATION OF GAIT IMMEDIATELY AFTER GO: 1 - NO HESITANCY
TRUNK SCORE: 0

## 2025-02-03 ASSESSMENT — PAIN SCALES - GENERAL
PAINLEVEL_OUTOF10: 6
PAINLEVEL_OUTOF10: 4
PAINLEVEL_OUTOF10: 7
PAINLEVEL_OUTOF10: 7
PAINLEVEL_OUTOF10: 9
PAINLEVEL_OUTOF10: 4
PAINLEVEL_OUTOF10: 3
PAINLEVEL_OUTOF10: 4
PAINLEVEL_OUTOF10: 3
PAINLEVEL_OUTOF10: 4
PAINLEVEL_OUTOF10: 3
PAINLEVEL_OUTOF10: 7

## 2025-02-03 ASSESSMENT — BALANCE ASSESSMENTS
BALANCE SCORE: 14
STANDING BALANCE: 2 - NARROW STANCE WITHOUT SUPPORT
EYES CLOSED AT MAXIMUM POSITION NUDGED: 1 - STEADY
SITTING BALANCE: 1 - STEADY, SAFE
ARISES: 1 - ABLE, USES ARMS TO HELP
ARISING SCORE: 1
IMMEDIATE STANDING BALANCE FIRST 5 SECONDS: 2 - STEADY WITHOUT WALKER OR OTHER SUPPORT
NUDGED: 2 - STEADY
SITTING DOWN: 1 - USES ARMS OR NOT SMOOTH MOTION
TURNING 360 DEGREES STEPS: 1 - CONTINUOUS STEPS
NUDGED SCORE: 2
ATTEMPTS TO ARISE: 2 - ABLE TO RISE, ONE ATTEMPT

## 2025-02-03 ASSESSMENT — PAIN DESCRIPTION - LOCATION
LOCATION: BACK
LOCATION: BACK
LOCATION: KNEE
LOCATION: ABDOMEN
LOCATION: KNEE
LOCATION: KNEE

## 2025-02-03 ASSESSMENT — PAIN - FUNCTIONAL ASSESSMENT
PAIN_FUNCTIONAL_ASSESSMENT: 0-10

## 2025-02-03 ASSESSMENT — ACTIVITIES OF DAILY LIVING (ADL)
HOME_HEALTH_OASIS: 00
OASIS_M1830: 00
LACK_OF_TRANSPORTATION: NO
ADL_ASSISTANCE: INDEPENDENT
ADL_ASSISTANCE: INDEPENDENT
AMBULATION_DISTANCE/DURATION_TOLERATED: 150'

## 2025-02-03 NOTE — CARE PLAN
The patient's goals for the shift include      The clinical goals for the shift include see poc    Over the shift, the patient did not make progress toward the following goals.

## 2025-02-03 NOTE — NURSING NOTE
End of shift note, no acute changes this shift. Patient switched to po percocet for pain. Patient remains on eliquis po for dvt/pe. Patient vitals stable. Patient was npo this shift for right upper quad us. Patient requesting to eat and do ultrasound tomorrow am. Patient wasn't scheduled until 2000. Dr willoughby and ultrasound had 8am appointment available for tomorrow. Patient given dinner tray and will be npo after midnight

## 2025-02-03 NOTE — PROGRESS NOTES
02/03/25 1259   Discharge Planning   Living Arrangements Spouse/significant other   Support Systems Spouse/significant other   Assistance Needed yes   Type of Residence Private residence   Home or Post Acute Services None   Expected Discharge Disposition Home   Does the patient need discharge transport arranged? No   Financial Resource Strain   How hard is it for you to pay for the very basics like food, housing, medical care, and heating? Not very   Housing Stability   In the last 12 months, was there a time when you were not able to pay the mortgage or rent on time? N   Transportation Needs   In the past 12 months, has lack of transportation kept you from medical appointments or from getting medications? no   In the past 12 months, has lack of transportation kept you from meetings, work, or from getting things needed for daily living? No   Patient Choice   Patient / Family choosing to utilize agency / facility established prior to hospitalization Yes   Stroke Family Assessment   Stroke Family Assessment Needed No   Intensity of Service   Intensity of Service 0-30 min     Met with the patient and family member in the room. He gave permission to speak with his family member present. Per the patient, he lives at home with his wife. He had recent knee surgery, was using a cane prior to admission. He had recent home visits, his first outpatient therapy appointment was scheduled for the 4th. Currently, his preference is to return home and have outpatient therapy. He states that he is not able to drive for the next 2 weeks, so he needs to work around his wife's schedule for transport. He has an appointment next month with his primary Care MD, Dr. Frey. His wife helps him manage his medications.

## 2025-02-03 NOTE — NURSING NOTE
EOS: Patient rested well throughout the night. Patient complained of 8/10 right lower back pain that is relieved with prn 1mg dilaudid. Safety has been maintained. Patient is stable at the time of writing.

## 2025-02-03 NOTE — PROGRESS NOTES
"ID:  Abhishek Olson \"Nir" is a 64 y.o. male on hospital day 1 of admission presenting with Other acute pulmonary embolism, unspecified whether acute cor pulmonale present (Multi).    Subjective   The patient seen and examined this morning at bedside.  Patient pain significantly improved compared to yesterday.  He was laying comfortably in bed.  He denied having any lightheadedness, dizziness, shortness of breath, abdominal pain, change in bowel habits, or urinary symptoms.  Still have mild pain however reported to be tolerable.        Objective     Visit Vitals  /82   Pulse 108   Temp 36.5 °C (97.7 °F) (Temporal)   Resp 19   Ht 1.88 m (6' 2\")   Wt 118 kg (260 lb)   SpO2 92%   BMI 33.38 kg/m²   Smoking Status Never   BSA 2.48 m²        Physical Exam  Constitutional:       General: He is not in acute distress.     Appearance: Normal appearance. He is normal weight.   HENT:      Head: Normocephalic and atraumatic.      Right Ear: External ear normal.      Left Ear: External ear normal.      Nose: Nose normal. No congestion or rhinorrhea.      Mouth/Throat:      Mouth: Mucous membranes are moist.      Pharynx: Oropharynx is clear. No posterior oropharyngeal erythema.   Eyes:      General:         Right eye: No discharge.         Left eye: No discharge.      Extraocular Movements: Extraocular movements intact.      Conjunctiva/sclera: Conjunctivae normal.      Pupils: Pupils are equal, round, and reactive to light.   Cardiovascular:      Rate and Rhythm: Normal rate and regular rhythm.      Pulses: Normal pulses.      Heart sounds: Normal heart sounds. No murmur heard.  Pulmonary:      Effort: No respiratory distress.      Breath sounds: Normal breath sounds. No wheezing, rhonchi or rales.   Chest:      Chest wall: No tenderness.   Abdominal:      General: Abdomen is flat. Bowel sounds are normal. There is no distension.      Palpations: Abdomen is soft.      Tenderness: There is abdominal tenderness (Right-sided " abdominal tenderness). There is no guarding.   Musculoskeletal:         General: Swelling (Mild swelling of right knee) present. Normal range of motion.      Cervical back: Normal range of motion and neck supple. No rigidity or tenderness.        Back:       Right lower le+ Edema (Mild right lower extremity swelling) present.        Legs:       Comments: Clean surgical scar can be noted over the right knee s/p right total knee arthroplasty   Skin:     General: Skin is warm and dry.   Neurological:      General: No focal deficit present.      Mental Status: He is alert and oriented to person, place, and time.   Psychiatric:         Mood and Affect: Mood normal.         Behavior: Behavior normal.       Laboratory Data:    Results from last 7 days   Lab Units 25  0659 25  0444 25  1608   WBC AUTO x10*3/uL 8.3 10.8 11.1   RBC AUTO x10*6/uL 3.54* 3.64* 4.16*   HEMOGLOBIN g/dL 11.0* 11.7* 13.3*     Results from last 7 days   Lab Units 25  0659 25  0444 25  1608   SODIUM mmol/L 132* 134* 132*   POTASSIUM mmol/L 3.9 4.0 4.0   CHLORIDE mmol/L 92* 95* 97*   CO2 mmol/L 33* 29 27   BUN mg/dL 17 22 25*   CREATININE mg/dL 0.71 0.93 0.90   CALCIUM mg/dL 9.4 9.1 9.6   PHOSPHORUS mg/dL  --  3.8  --    MAGNESIUM mg/dL 2.02 1.97  --    BILIRUBIN TOTAL mg/dL 1.7* 1.6* 2.1*   ALT U/L 182* 131* 139*   AST U/L 117* 89* 96*       Imaging:  Vascular US lower extremity venous duplex bilateral    Result Date: 2/3/2025            [facility] [place] [address] [place] [address]     Tel [phone] Fax [phone]  Vascular Lab Report  VASC US LOWER EXTREMITY VENOUS DUPLEX BILATERAL Patient Name:      AMADOR SANTOYO      Julius Physician: [id number] Joceline Yuen MD Study Date:        2/3/2025             Ordering Provider: [id number] JEANCARLOS BRAUN MRN/PID:           [id number]             Fellow: Accession#:        [id number]          Technologist:      Melany Galvan RVT Date of Birth/Age: 1960 / 64      Technologist 2:                    years Gender:            M                    Encounter#:        [id number] Admission Status:  Inpatient            Location           [facility]                                         Performed:  Diagnosis/ICD: Other pulmonary embolism without acute cor pulmonale-I26.99 Indication:    Pulmonary Embolism CPT Codes:     61206 Peripheral venous duplex scan for DVT complete  Pertinent History: HTN, Hyperlipidemia, PE, Anticoagulation and Recent Surgery.                    Right knee arthroplasty.  **CRITICAL RESULT** Critical Result: DVT in LPTV Notification called to Rom Bazan DO on 2/3/2025 at 9:09:30 AM by Melany Galvan RVT.  CONCLUSIONS: Right Lower Venous: No evidence of acute deep vein thrombus visualized in the right lower extremity. Left Lower Venous: There is acute occlusive deep vein thrombosis visualized in the posterior tibial vein. The remainder of the left leg is negative for deep vein thrombosis. Additional Findings: Technically difficult study due to patient's pain level and inability to lay flat on back. Sub-optimal images.  Imaging & Doppler Findings:  Right                 Compressible Thrombus        Flow Distal External Iliac     Yes        None   Spontaneous/Phasic CFV                       Yes        None   Spontaneous/Phasic PFV                       Yes        None FV Proximal               Yes        None   Spontaneous/Phasic FV Mid                    Yes        None FV Distal                 Yes        None Popliteal                 Yes        None   Spontaneous/Phasic Peroneal                  Yes        None PTV                       Yes        None  Left                  Compress    Thrombus            Flow Distal External Iliac   Yes         None       Spontaneous/Phasic CFV                     Yes         None       Spontaneous/Phasic PFV                     Yes          None FV Proximal             Yes         None       Spontaneous/Phasic FV Mid                  Yes         None FV Distal               Yes         None Popliteal               Yes         None       Spontaneous/Phasic Peroneal                Yes         None PTV                      No    Acute occlusive        None  [id number] Joceline Yuen MD Electronically signed by [id number] Joceline Yuen MD on 2/3/2025 at 4:50:04 PM  ** Final **     Transthoracic Echo (TTE) Limited    Result Date: 2/3/2025            [facility] [place] [address] [address] [address]    Tel [phone] Fax [phone] TRANSTHORACIC ECHOCARDIOGRAM REPORT Patient Name:       AMADOR [place] Physician:    [id number] Indra Boland MD Study Date:         2/3/2025             Ordering Provider:    [id number] JEANCARLOS BRAUN MRN/PID:            [id number]             Fellow: Accession#:         [id number]         Nurse: Date of Birth/Age:  1960 / 64      Sonographer:          Jillian tang Gender Assigned at  M                    Additional Staff: Birth: Height:             187.96 cm            Admit Date: Weight:             117.94 kg            Admission Status:     Inpatient -                                                                Priority                                                                discharge BSA / BMI:          2.43 m2 / 33.38      Department Location:  [facility] Echo Lab                     kg/m2 Blood Pressure: 153 /95 mmHg Study Type:    TRANSTHORACIC ECHO (TTE) LIMITED Diagnosis/ICD: Other pulmonary embolism without acute cor pulmonale-I26.99 Indication:    PE CPT Codes:     Echo Limited-47505; Doppler Limited-56351; Color Doppler-73904 Patient History: Pertinent History: HTN and PE. Study Detail: The following Echo studies were performed: 2D, M-Mode,  Doppler and               color flow.  PHYSICIAN INTERPRETATION: Left Ventricle: Left ventricular ejection fraction is normal, by visual estimate at 60-65%. There is mild to moderate left ventricular hypertrophy. There are no regional wall motion abnormalities. The left ventricular cavity size is normal. There is mild increased septal and mildly increased posterior left ventricular wall thickness. There is left ventricular concentric remodeling. Spectral Doppler shows a Grade I (impaired relaxation pattern) of left ventricular diastolic filling with normal left atrial filling pressure. LV Wall Scoring: All segments are normal. Left Atrium: The left atrial size is mild to moderately dilated. Right Ventricle: The right ventricle is normal in size. There is normal right ventricular global systolic function. Right Atrium: The right atrial size is normal. Aortic Valve: The aortic valve is trileaflet. There is no evidence of aortic valve regurgitation. Mitral Valve: The mitral valve is normal in structure. There is no evidence of mitral valve regurgitation. Tricuspid Valve: The tricuspid valve is structurally normal. No evidence of tricuspid regurgitation. The Doppler estimated RVSP is within normal limits at 18.4 mmHg. Pulmonic Valve: The pulmonic valve is structurally normal. There is no indication of pulmonic valve regurgitation. Pericardium: No pericardial effusion noted. Aorta: The aortic root is normal. There is mild dilatation of the ascending aorta. The aortic root is at the upper limits of normal size.  CONCLUSIONS:  1. Left ventricular ejection fraction is normal, by visual estimate at 60-65%.  2. Spectral Doppler shows a Grade I (impaired relaxation pattern) of left ventricular diastolic filling with normal left atrial filling pressure.  3. There is normal right ventricular global systolic function.  4. Normal size RV.  5. The left atrial size is mild to moderately dilated.  6. Right ventricular systolic  pressure is within normal limits.  7. No significant valve disease. QUANTITATIVE DATA SUMMARY:  2D MEASUREMENTS:             Normal Ranges: LAs:             4.10 cm     (2.7-4.0cm) RVIDd:           4.00 cm     (0.9-3.6cm) IVSd:            1.30 cm     (0.6-1.1cm) LVPWd:           1.30 cm     (0.6-1.1cm) LVIDd:           4.00 cm     (3.9-5.9cm) LVIDs:           2.60 cm LV Mass Index:   76.8 g/m2 LVEDV Index:     35.92 ml/m2 LV % FS          35.0 %  LEFT ATRIUM:                  Normal Ranges: LA Vol A4C:        75.0 ml    (22+/-6mL/m2) LA Vol A2C:        70.1 ml LA Vol BP:         74.1 ml LA Vol Index A4C:  30.9ml/m2 LA Vol Index A2C:  28.9 ml/m2 LA Vol Index BP:   30.5 ml/m2 LA Area A4C:       22.5 cm2 LA Area A2C:       21.3 cm2 LA Major Axis A4C: 5.7 cm LA Major Axis A2C: 5.5 cm LA Volume Index:   29.2 ml/m2 LA Vol A4C:        72.6 ml LA Vol A2C:        67.1 ml LA Vol Index BSA:  28.8 ml/m2  RIGHT ATRIUM:                 Normal Ranges: RA Vol A4C:        29.2 ml    (8.3-19.5ml) RA Vol Index A4C:  12.0 ml/m2 RA Area A4C:       13.9 cm2 RA Major Axis A4C: 5.6 cm  LV SYSTOLIC FUNCTION:                      Normal Ranges: EF-A4C View:    58 % (>=55%) EF-A2C View:    59 % EF-Biplane:     59 % EF-Visual:      63 % LV EF Reported: 63 %  LV DIASTOLIC FUNCTION:            Normal Ranges: MV Peak E:             0.61 m/s   (0.7-1.2 m/s) MV Peak A:             0.70 m/s   (0.42-0.7 m/s) E/A Ratio:             0.87       (1.0-2.2) MV e'                  0.065 m/s  (>8.0) MV lateral e'          0.07 m/s MV medial e'           0.06 m/s E/e' Ratio:            9.47       (<8.0) PulmV Sys Harvinder:         36.60 cm/s PulmV Lee Harvinder:        38.30 cm/s PulmV S/D Harvinder:         1.00 PulmV A Revs Harvinder:      39.70 cm/s PulmV A Revs Dur:      95.00 msec  MITRAL VALVE:          Normal Ranges: MV DT:        260 msec (150-240msec)  RIGHT VENTRICLE: RV Basal 3.90 cm RV Mid   3.30 cm RV Major 7.8 cm TAPSE:   31.6 mm RV s'    0.19 m/s  TRICUSPID  VALVE/RVSP:          Normal Ranges: Peak TR Velocity:     1.96 m/s Est. RA Pressure:     3 mmHg RV Syst Pressure:     18 mmHg  (< 30mmHg) IVC Diam:             1.60 cm  PULMONIC VALVE:          Normal Ranges: PV Accel Time:  121 msec (>120ms) PV Max Harvinder:     1.1 m/s  (0.6-0.9m/s) PV Max P.2 mmHg  PULMONARY VEINS: PulmV A Revs Dur: 95.00 msec PulmV A Revs Harvinder: 39.70 cm/s PulmV Lee Harvinder:   38.30 cm/s PulmV S/D Harvinder:    1.00 PulmV Sys Harvinder:    36.60 cm/s  [id number] Indra Boland MD Electronically signed on 2/3/2025 at 9:32:29 AM  Wall Scoring  ** Final **     CT abdomen pelvis w IV contrast    Result Date: 2025  Interpreted By:  Doug Ewing, STUDY: CT ABDOMEN PELVIS W IV CONTRAST;  2025 6:38 pm   INDICATION: Signs/Symptoms:rule out cholecystitis, pancreatitis.     COMPARISON: None.   ACCESSION NUMBER(S): [id number]   ORDERING CLINICIAN: JEANCARLOS BRAUN   TECHNIQUE: Contiguous axial images of the abdomen and pelvis were obtained after the intravenous administration of 75 mL of Omnipaque 350 iodinated contrast. Coronal and sagittal reformatted images were reconstructed from the axial data.   FINDINGS: LOWER CHEST: Please refer to separately dictated same day CT PE for further characterization of the thoracic findings.     ABDOMEN/PELVIS:   ABDOMINAL WALL: Cutaneous tissues of the abdomen and pelvis do not demonstrate any acute abnormality. Small fat containing umbilical hernia is present.   LIVER: No acute hepatic parenchymal abnormality is present. Portal vein is unremarkable in appearance.   BILE DUCTS: No intrahepatic or extrahepatic biliary dilatation is present.   GALLBLADDER: No gallbladder wall thickening or pericholecystic stranding is identified.   PANCREAS: No pancreatic ductal dilatation or peripancreatic stranding is present.   SPLEEN: No acute splenic abnormalities are identified.   ADRENALS: Bilateral adrenal glands are unremarkable.   KIDNEYS, URETERS, BLADDER: Kidneys are  symmetric in size and enhancement without evidence of hydronephrosis or radiopaque nephrolithiasis. Several hypodensities bilaterally likely represent small cysts.   Visualized upper ureters are not dilated.   Partially visualized bladder is unremarkable in appearance.   REPRODUCTIVE ORGANS: Prostate is not well visualized due to beam hardening artifact from bilateral hip replacements.   VESSELS: Atherosclerotic plaques are present in the abdominal aorta without evidence of acute vascular abnormality.   RETROPERITONEUM/LYMPH NODES: No acute retroperitoneal abnormality. No enlarged lymph nodes.   BOWEL/MESENTERY/PERITONEUM: Numerous diverticula are present in the descending and sigmoid colon without evidence of acute diverticulitis. No inflammatory bowel wall thickening or dilatation. Normal appendix.   No ascites, free air, or fluid collection.     MUSCULOSKELETAL: Multilevel degenerative changes are present in the lumbar spine, with severe intervertebral disc height loss at L4-L5 and L5-S1.   Patient is status post bilateral hip arthroplasties. No acute osseous abnormality is identified in the pelvis.       1. No acute abnormality is identified in the abdomen or pelvis. There is no evidence of cholecystitis or pancreatitis. 2. Numerous diverticula are present in the descending and sigmoid colon without evidence of acute diverticulitis.   MACRO: None.   Signed by: Doug Ewing 2/1/2025 7:17 PM Dictation workstation:   [id number]    CT angio chest for pulmonary embolism    Result Date: 2/1/2025  Interpreted By:  Doug Ewing, STUDY: CT ANGIO CHEST FOR PULMONARY EMBOLISM;  2/1/2025 6:38 pm   INDICATION: Signs/Symptoms:just had knee replaced, SOB.     COMPARISON: None   ACCESSION NUMBER(S): [id number]   ORDERING CLINICIAN: JEANCARLOS BRAUN   TECHNIQUE: Helical data acquisition of the chest was obtained after intravenous administration of 75 mL Omnipaque 350, as per PE protocol. Images were reformatted  in coronal and sagittal planes. Axial and coronal maximum intensity projection (MIP) images were created and reviewed.   FINDINGS: POTENTIAL LIMITATIONS OF THE STUDY: Images are somewhat degraded by motion.   HEART AND VESSELS: No large central pulmonary embolism is present although several subtle segmental filling defects are present in the smaller arteries supplying the right upper lobe (series 401, image 121). Additionally, small segmental emboli are also suspected in the right lower lobe (series 401, image 181).   Main pulmonary artery is not enlarged.   The thoracic aorta normal in course and caliber.Minimal vascular calcifications are present in the thoracic aorta.Although, the study is not tailored for evaluation of aorta, there is no definite evidence of acute aortic pathology. Mild coronary artery calcifications are seen. Please note,the study is not optimized for evaluation of coronary arteries.   The cardiac chambers are not enlarged.There is somewhat asymmetric enlargement of the right atrium compared to the left ventricle, likely due to left ventricular hypertrophy. There is no pericardial effusion seen.   MEDIASTINUM AND KEVIN, LOWER NECK AND AXILLA: The visualized thyroid gland is within normal limits. No evidence of thoracic lymphadenopathy by CT criteria. Esophagus appears within normal limits as seen.   LUNGS AND AIRWAYS: The trachea and central airways are patent. No endobronchial lesion is seen.   Volume loss in the atelectasis is present in the lower lobes, right-greater-than-left, with a small right-sided pleural effusion. Additionally some ground-glass/consolidative opacities are present in the medial aspect of the right lower lobe (series 401, image 211).   UPPER ABDOMEN: Please refer to separately dictated same day CT of the abdomen and pelvis for further characterization of the abdominal findings.   CHEST WALL AND OSSEOUS STRUCTURES: Chest wall is within normal limits. No acute osseous  pathology.There are no suspicious osseous lesions.Multilevel degenerative changes are present in the thoracic spine without evidence of compression fracture or high-grade stenosis.       1. Several segmental pulmonary emboli are present in the right upper lobe, with several additional suspected in the in the right lower lobe. 2. Somewhat asymmetric enlargement of the right ventricle is present compared to the contralateral left, favored to be secondary to left ventricular hypertrophy. Correlate with echocardiogram and ejection fraction. 3. Volume loss/atelectasis is present in the lower lobes bilaterally, with some patchy and ground-glass opacities noted in the medial aspect of the right lower lobe with the associated small right-sided pleural effusion, possibly representing changes of developing pneumonia versus pulmonary infarction. Correlate with physical exam.   MACRO: None   Signed by: Doug Ewing 2/1/2025 7:11 PM Dictation workstation:   [id number]      Medications:  Scheduled medications  apixaban, 10 mg, oral, BID   Followed by  [START ON 2/9/2025] apixaban, 5 mg, oral, BID  docusate sodium, 100 mg, oral, BID  escitalopram, 20 mg, oral, Daily  gabapentin, 100 mg, oral, TID  lisinopril 20 mg, hydroCHLOROthiazide 25 mg for Zestoretic/Prinizide, , oral, Daily  oxygen, , inhalation, Continuous - Inhalation  pantoprazole, 40 mg, oral, Daily  polyethylene glycol, 17 g, oral, Daily  rosuvastatin, 20 mg, oral, Daily  venlafaxine XR, 75 mg, oral, Daily      Continuous medications     PRN medications  PRN medications: HYDROmorphone, indomethacin SR, oxyCODONE-acetaminophen, oxyCODONE-acetaminophen, oxygen, polyethylene glycol    Assessment    Assessment & Plan   Mr. Abhishek Olson is a 64 y.o. male who presents with acute chest pain and shortness of breath and was found to have acute segmental pulmonary embolism, unprovoked.  Most likely secondary to recent knee surgery.  Patient was on DVT prophylaxis in  the form of aspirin 81 mg twice daily.  Currently lower extremity duplex ultrasound is pending to rule out DVT.  Patient was also found to have transaminitis which could be related to the acute pulmonary embolism.  Hepatitis panel as well as GGT are ordered.  Will follow-up on the results.  Assessment & Plan  Other acute pulmonary embolism, unspecified whether acute cor pulmonale present (Multi)         # Acute segmental PE 2/2 recent surgery  # Pleurisy  -Current presentation most likely provoked by recent surgery although patient was on DVT prophylaxis in the form of aspirin 81 mg twice daily.  -CT imaging showing groundglass opacities and pleural effusion together with the patient's complaint of sharp pain on the right side of the back that gets aggravated by deep inspiration.  Suspicion for pulmonary infarction complicated with pleurisy.  -Patient received a dose of lovenox 1 mg/kg in the ED.   Plan  -Will transition patient from Lovenox to Eliquis.  -Aspirin dose reduced to 81 mg once daily due to increased risk of bleeding when combined with Eliquis at a dose greater than 100 mg.  -TTE to further assess ventricular function.  Showed normal ejection fraction 60 to 65%  -Vascular duplex BLE to rule out DVT.  Positive for DVT in the posterior tibial vein  -Hold off antibiotics therapy   -Wean off oxygen as tolerated  -Pain management was advised to Percocet 1 tablet for moderate pain, 2 tablets for severe pain as needed together with 0.5 Dilaudid for breakthrough pain.    # Transaminitis  -Lipid panel came back normal    Plan  -Hepatitis panel came back negative  -GGT elevated (519)  -Will continue monitoring at this time with no further intervention.  -Right upper quadrant ultrasound will be ordered for further evaluation.    # Right knee pain s/p total arthroplasty   -No overt signs of acute infectious process on exam. Can flex knee and extend knee but somewhat limited by pain. Low suspicion for cellulitis and  septic arthritis at this time.     Plan:   -PT/OT   -Wound care   -On pain control as stated above        Chronic conditions:   Hypertension  Hyperlipidemia  GABRIELA on CPAP  Continue home med as appropriate    Global Plan of Care  Fluid: PRN  Electrolytes: PRN  Nutrition: Regular  DVT Prophylaxis: Eliquis, therapeutic dose for pulmonary embolism.  GI Prophylaxis: None  Code Status: Full Code     Emergency Contact: Extended Emergency Contact Information  Primary Emergency Contact: MESSI SANTOYO  Address: [address] [place] [address]  Mobile Phone: [phone]  Relation: Spouse  Preferred language: English   needed? No  Secondary Emergency Contact: [email or web address]   [facility]  Mobile Phone: [phone]  Relation: None  Preferred language: English   needed? No     Patient seen and discussed with the attending.  Signature: EMILY DIAZ MD, PGY I Internal medicine  Date: February 3, 2025   This note has been transcribed using Dragon voice recognition system and there is a possibility of unintentional typing misprints.  Any information found to be copied from previous providers is done in the best interest of the patient to provide accurate, quality, and continuity of care.

## 2025-02-03 NOTE — PROGRESS NOTES
"Physical Therapy    Physical Therapy Evaluation & Treatment    Patient Name: Abhishek Olson \"Jaron\"  MRN: [id number]  Today's Date: 2/3/2025   Time Calculation  Start Time: 1134  Stop Time: 1157  Time Calculation (min): 23 min  3028/3028-A    Assessment/Plan   PT Assessment  PT Assessment Results: Decreased strength, Decreased range of motion, Decreased mobility, Decreased safety awareness, Pain, Impaired balance  Rehab Prognosis: Good  Medical Staff Made Aware: Yes  Strengths: Access to adaptive/assistive products, Capable of completing ADLs semi/independent, Support and attitude of living partners  End of Session Communication: Bedside nurse  Assessment Comment: Pt is a 63 y/o male admitted for acute PE and DVT. Pt presents with pain, decreased strength, and balance. Pt able to tolerate bed mobility, transfers and gait training this date, although will continue to benefit from further gait and stair training during stay in this facility. Pt will benefit from Rx for outpatient PT upon discharge.     End of Session Patient Position: Up in chair, Alarm on (call light within reach)  IP OR SWING BED PT PLAN  Inpatient or Swing Bed: Inpatient  PT Plan  Treatment/Interventions: Bed mobility, Transfer training, Gait training, Stair training, Balance training, Neuromuscular re-education, Endurance training, Strengthening, Range of motion, Therapeutic exercise, Therapeutic activity, Home exercise program, Positioning, Postural re-education  PT Plan: Ongoing PT  PT Frequency: One time follow up visit  PT Discharge Recommendations: Other (comment) (Rx for outpatient PT)  Equipment Recommended upon Discharge: Straight cane  PT Recommended Transfer Status: Assist x1, Assistive device, Contact guard  PT - OK to Discharge: Yes (Once medically cleared)    Current Problem:  Patient Active Problem List   Diagnosis    Hypertension, essential    Low testosterone in male    Concussion with no loss of consciousness    Anxiety    " Unilateral primary osteoarthritis, right knee    S/P total knee arthroplasty, right    GABRIELA (obstructive sleep apnea)    Other acute pulmonary embolism, unspecified whether acute cor pulmonale present (Multi)     Subjective     General Visit Information:  General  Reason for Referral: P/w chest pain. Pt states he woke up with a constant chest pain at 3 AM yesterday associated with right mid back pain and SOB. He says the pain was initially located in the middle of his chest and now moved across his chest.  He described the pain as intermittently sharp and dull. CTA chest shows several segmental pulmonary emboli in the right upper lobe and in the right lower lobe. Pt also found to have LLE DVT. Pt admitted for PE.  Referred By: Dr. Perez  Past Medical History Relevant to Rehab: HTN, HLD, GABRIELA on CPAP, Anxiety, s/p R TKA 1/20/25  Family/Caregiver Present: No  Co-Treatment: OT  Co-Treatment Reason: To maximize pt safety with functional mobility and discharge planning  Prior to Session Communication: Bedside nurse  Patient Position Received: Bed, 3 rail up, Alarm on  Preferred Learning Style: verbal  General Comment: Pt pleasant and agreeable to work with therapy.    Home Living:  Home Living  Type of Home: House  Lives With: Spouse  Home Adaptive Equipment: Walker rolling or standard, Cane, Sock aid, Reacher, Long-handled shoehorn, Long-handled sponge  Home Layout: Two level, Bed/bath upstairs, Stairs to alternate level with rails  Alternate Level Stairs-Number of Steps: 12 DENISHA with HR up to bed/bath  Home Access: Stairs to enter with rails  Entrance Stairs-Number of Steps: 2  Bathroom Shower/Tub: Walk-in shower    Prior Level of Function:  Prior Function Per Pt/Caregiver Report  Level of [place]: Independent with ADLs and functional transfers, Independent with homemaking with ambulation  Receives Help From: Family  ADL Assistance: Independent  Homemaking Assistance: Independent  Ambulatory Assistance: Independent  (Mod I with cane)  Vocational: Retired ( for [employer])    Precautions:  Precautions  LE Weight Bearing Status: Weight Bearing as Tolerated (RLE)  Medical Precautions: Fall precautions  Post-Surgical Precautions: Right total knee precautions    Objective     Pain:  Pain Assessment  Pain Assessment: 0-10  0-10 (Numeric) Pain Score: 4  Pain Type: Acute pain  Pain Location: Back  Pain Interventions: Medication (See MAR), Repositioned    Cognition:  Cognition  Overall Cognitive Status: Within Functional Limits  Orientation Level: Oriented X4    General Assessments:      Activity Tolerance  Endurance: Endurance does not limit participation in activity    Sensation  Light Touch: No apparent deficits  Sensation Comment: Pt denies any n/t.     Static Sitting Balance  Static Sitting-Balance Support: Bilateral upper extremity supported, Feet supported  Static Sitting-Level of Assistance: Close supervision  Static Standing Balance  Static Standing-Balance Support: Bilateral upper extremity supported  Static Standing-Level of Assistance: Contact guard  Dynamic Standing Balance  Dynamic Standing-Balance Support: Bilateral upper extremity supported  Dynamic Standing-Level of Assistance: Contact guard    Functional Assessments:     Bed Mobility  Bed Mobility: Yes  Bed Mobility 1  Bed Mobility 1: Supine to sitting, Scooting  Level of Assistance 1: Modified independent    Transfers  Transfer: Yes  Transfer 1  Transfer From 1: Bed to  Transfer to 1: Stand  Technique 1: Sit to stand  Transfer Device 1: Cane, Gait belt  Transfer Level of Assistance 1: Contact guard  Transfers 2  Transfer From 2: Stand to  Transfer to 2: Chair with arms  Technique 2: Stand to sit  Transfer Device 2: Cane, Gait belt  Transfer Level of Assistance 2: Contact guard    Ambulation/Gait Training  Ambulation/Gait Training Performed: Yes  Ambulation/Gait Training 1  Surface 1: Level tile  Device 1: Single point cane  Gait Support Devices: Gait  belt  Assistance 1: Contact guard, Minimal verbal cues  Quality of Gait 1:  (decreased [place], decreased stance time on RLE, episodes of some instability)  Comments/Distance (ft) 1: ~250 ft with 3 point gait pattern; VCs for safety awareness     Extremity/Trunk Assessments:  RUE   RUE : Within Functional Limits  LUE   LUE: Within Functional Limits  RLE   RLE : Within Functional Limits  LLE   LLE : Within Functional Limits    Treatments:  Pt able to tolerate the following activities during today's treatment session. Pt instructed/educated throughout the session on safety awareness, body mechanics and proper hand placement.Pt educated to continue HEP on LE strength/ROM exercises with reminders of various techniques for exercises.     Ambulation/Gait Training  Ambulation/Gait Training Performed: Yes  Ambulation/Gait Training 1  Surface 1: Level tile  Device 1: Single point cane  Gait Support Devices: Gait belt  Assistance 1: Contact guard, Minimal verbal cues  Quality of Gait 1:  (decreased d[place], decreased stance time on RLE, episodes of some instability)  Comments/Distance (ft) 1: ~250 ft with 3 point gait pattern; VCs for safety awareness    Outcome Measures:  [facility] Basic Mobility  Turning from your back to your side while in a flat bed without using bedrails: None  Moving from lying on your back to sitting on the side of a flat bed without using bedrails: None  Moving to and from bed to chair (including a wheelchair): A little  Standing up from a chair using your arms (e.g. wheelchair or bedside chair): A little  To walk in hospital room: A little  Climbing 3-5 steps with railing: A lot  Basic Mobility - Total Score: 19     Goals:  Encounter Problems       Encounter Problems (Active)       Mobility       Pt will be able to ambulate >/= 300 ft with LRD distant supervision with good safety awareness.        Start:  02/03/25    Expected End:  02/17/25            Pt will be able to negotiate ascending/descending 12  DENISHA with use of unilateral HR SBA with sufficient foot clearance and good safety awareness for safe home going.         Start:  02/03/25    Expected End:  02/17/25            Pt will complete supine, seated and standing exercises to maintain/improve overall strength with minimal verbal cues.         Start:  02/03/25    Expected End:  02/17/25               PT Transfers       Pt will be able to complete all transfers with LRD mod I demonstrating good safety awareness and proper body mechanics.        Start:  02/03/25    Expected End:  02/17/25                 Education Documentation  Precautions, taught by Ayana Murillo PT at 2/3/2025  3:37 PM.  Learner: Patient  Readiness: Acceptance  Method: Explanation  Response: Verbalizes Understanding, Demonstrated Understanding, Needs Reinforcement    Body Mechanics, taught by Ayana Murillo PT at 2/3/2025  3:37 PM.  Learner: Patient  Readiness: Acceptance  Method: Explanation  Response: Verbalizes Understanding, Demonstrated Understanding, Needs Reinforcement    Home Exercise Program, taught by Ayana Murillo PT at 2/3/2025  3:37 PM.  Learner: Patient  Readiness: Acceptance  Method: Explanation  Response: Verbalizes Understanding, Demonstrated Understanding, Needs Reinforcement    Mobility Training, taught by Ayana Murillo PT at 2/3/2025  3:37 PM.  Learner: Patient  Readiness: Acceptance  Method: Explanation  Response: Verbalizes Understanding, Demonstrated Understanding, Needs Reinforcement    Education Comments  No comments found.

## 2025-02-03 NOTE — PROGRESS NOTES
"Occupational Therapy    Occupational Therapy    Evaluation    Patient Name: Abhishek Olson \"Nir"  MRN: [id number]  Today's Date: 2/3/2025  Time Calculation  Start Time: 1133  Stop Time: 1155  Time Calculation (min): 22 min  3028/3028-A    Assessment  IP OT Assessment  OT Assessment:  (Pt. has no skilled OT needs, pt. at baseline post TKR)  Evaluation/Treatment Tolerance: Patient tolerated treatment well  End of Session Communication: Bedside nurse  End of Session Patient Position: Up in chair, Alarm on    Plan:  OT Frequency: OT eval only  OT Discharge Recommendations: No further acute OT, No OT needed after discharge  OT - OK to Discharge: Yes (Next level of care when cleare by medical team)    Subjective   Per EMR: Abhishek Olson \"Nir" is a 64 y.o. male with past medical history of hypertension, hyperlipidemia, and GABRIELA on CPAP, who presents with chest pain.  The patient states he woke up with a constant chest pain at 3 AM yesterday associated with right mid back pain and shortness of breath.  He says the pain was initially located in the middle of his chest and now moved across his chest.  He described the pain as intermittently sharp and dull. He tried Toradol, Percocet, and tramadol without any relief. Pain got worse 1 hour ago prior to arrival, which prompted him to go to the ED for evaluation after a phone call from PCP.  Additionally, he spiked up a fever Tmax of 101.7°F yesterday and complained of right lower abdominal pain. He denies diaphoresis, nausea/vomiting/diarrhea, or URI/urinary symptoms.  Per chart review, he had a right total arthroplasty to treat osteoarthritis on 1/20/2025.  After the surgery, he was discharged home with aspirin but no anticoagulants for DVT prophylaxis.  He states that he has been doing physical therapy sessions daily at home over the last 2 weeks.  He can bear weight on the right leg and walk on it without any difficulty.  He denies any increased pain/swelling or purulent " discharge from the surgical incision in the right knee.  He has no prior history of DVT/PTE.  His father had a blood clot in the past thought to be secondary to a surgery.  He denies any family history of clotting disorders.  He had an A-fib in 2007 and was treated with medication but does not recall it.  He has not had any A-fib since that year.   Current Problem:  1. Other acute pulmonary embolism, unspecified whether acute cor pulmonale present (Multi)  Transthoracic Echo (TTE) Limited    Transthoracic Echo (TTE) Limited      2. Chest pain, unspecified type        3. Pneumonia due to infectious organism, unspecified laterality, unspecified part of lung        4. Acute low back pain without sciatica, unspecified back pain laterality        5. Leg swelling  Vascular US lower extremity venous duplex bilateral    Vascular US lower extremity venous duplex bilateral          General:  General  Reason for Referral: ADLs, discharge planning  Referred By: Dr. Perez  Past Medical History Relevant to Rehab: right TKR 1/20/25  Family/Caregiver Present: No  Co-Treatment: PT  Co-Treatment Reason: Safety  Prior to Session Communication: Bedside nurse  Patient Position Received: Bed, 3 rail up, Alarm on    Precautions:  LE Weight Bearing Status: Weight Bearing as Tolerated (right LE)  Medical Precautions: Fall precautions  Post-Surgical Precautions: Right total knee precautions        Pain:  Pain Assessment  Pain Assessment: 0-10  0-10 (Numeric) Pain Score: 4  Pain Type: Acute pain  Pain Location: Back    Objective     Cognition:  Overall Cognitive Status: Within Functional Limits  Insight: Within function limits             Home Living:  Home Living Comments:  (Pt. lives with spouse in home with 2 entry steps with bedroom and bathroom on second floor with walk in shower. MOD I with cane since TKR 1/20/25)     Prior Function:  Level of [place]: Independent with ADLs and functional transfers  ADL Assistance:  Independent  Homemaking Assistance: Independent  Ambulatory Assistance: Independent        ADL:  Grooming Assistance: Modified independent (Device)  LE Dressing Assistance: Stand by    Activity Tolerance:  Endurance: Endurance does not limit participation in activity    Bed Mobility/Transfers:   Bed Mobility  Bed Mobility:  (supine to sit MOD I)  Transfers  Transfer:  (sit<>stand MOD I)    Ambulation/Gait Training:  Functional Mobility  Functional Mobility Performed:  (Completes mobility with cane around unit MOD I)    Sitting Balance:  Static Sitting Balance  Static Sitting-Balance Support: No upper extremity supported  Static Sitting-Level of Assistance: Independent    Standing Balance:  Static Standing Balance  Static Standing-Balance Support: Left upper extremity supported  Static Standing-Level of Assistance: Modified Independent        Hand Function:  Hand Function  Gross Grasp: Functional  Coordination: Functional    Extremities: RUE   RUE : Within Functional Limits and LUE   LUE: Within Functional Limits    Outcome Measures: [facility] Daily Activity  Putting on and taking off regular lower body clothing: None  Bathing (including washing, rinsing, drying): None  Putting on and taking off regular upper body clothing: None  Toileting, which includes using toilet, bedpan or urinal: None  Taking care of personal grooming such as brushing teeth: None  Eating Meals: None  Daily Activity - Total Score: 24                       EDUCATION:  Education  Individual(s) Educated: Patient  Education Provided: Fall precautons, Risk and benefits of OT discussed with patient or other  Patient Response to Education: Patient/Caregiver Verbalized Understanding of Information      Goals: NA

## 2025-02-03 NOTE — NURSING NOTE
Pt declines CPAP at this time.  He states he is fine  with nasal O2 and will call if he changes his mind.

## 2025-02-03 NOTE — NURSING NOTE
Patient taken down via bed to biometrics department for echo and venous duplex. Assessment and vitals maintained.

## 2025-02-04 ENCOUNTER — PHARMACY VISIT (OUTPATIENT)
Dept: PHARMACY | Facility: CLINIC | Age: 65
End: 2025-02-04
Payer: COMMERCIAL

## 2025-02-04 ENCOUNTER — APPOINTMENT (OUTPATIENT)
Dept: RADIOLOGY | Facility: HOSPITAL | Age: 65
DRG: 300 | End: 2025-02-04
Payer: COMMERCIAL

## 2025-02-04 VITALS
HEART RATE: 101 BPM | OXYGEN SATURATION: 95 % | SYSTOLIC BLOOD PRESSURE: 120 MMHG | HEIGHT: 74 IN | DIASTOLIC BLOOD PRESSURE: 83 MMHG | RESPIRATION RATE: 18 BRPM | BODY MASS INDEX: 33.37 KG/M2 | TEMPERATURE: 98.2 F | WEIGHT: 260 LBS

## 2025-02-04 LAB
ALBUMIN SERPL BCP-MCNC: 3.6 G/DL (ref 3.4–5)
ALP SERPL-CCNC: 228 U/L (ref 33–136)
ALT SERPL W P-5'-P-CCNC: 129 U/L (ref 10–52)
ANION GAP SERPL CALC-SCNC: 12 MMOL/L (ref 10–20)
AST SERPL W P-5'-P-CCNC: 60 U/L (ref 9–39)
BILIRUB SERPL-MCNC: 1.2 MG/DL (ref 0–1.2)
BUN SERPL-MCNC: 17 MG/DL (ref 6–23)
CALCIUM SERPL-MCNC: 9.5 MG/DL (ref 8.6–10.3)
CHLORIDE SERPL-SCNC: 93 MMOL/L (ref 98–107)
CO2 SERPL-SCNC: 34 MMOL/L (ref 21–32)
CREAT SERPL-MCNC: 0.87 MG/DL (ref 0.5–1.3)
EGFRCR SERPLBLD CKD-EPI 2021: >90 ML/MIN/1.73M*2
ERYTHROCYTE [DISTWIDTH] IN BLOOD BY AUTOMATED COUNT: 12.7 % (ref 11.5–14.5)
GLUCOSE SERPL-MCNC: 93 MG/DL (ref 74–99)
HCT VFR BLD AUTO: 34.2 % (ref 41–52)
HGB BLD-MCNC: 10.9 G/DL (ref 13.5–17.5)
MAGNESIUM SERPL-MCNC: 2.03 MG/DL (ref 1.6–2.4)
MCH RBC QN AUTO: 30.8 PG (ref 26–34)
MCHC RBC AUTO-ENTMCNC: 31.9 G/DL (ref 32–36)
MCV RBC AUTO: 97 FL (ref 80–100)
NRBC BLD-RTO: 0 /100 WBCS (ref 0–0)
PLATELET # BLD AUTO: 378 X10*3/UL (ref 150–450)
POTASSIUM SERPL-SCNC: 3.9 MMOL/L (ref 3.5–5.3)
PROT SERPL-MCNC: 6.9 G/DL (ref 6.4–8.2)
RBC # BLD AUTO: 3.54 X10*6/UL (ref 4.5–5.9)
SODIUM SERPL-SCNC: 135 MMOL/L (ref 136–145)
WBC # BLD AUTO: 6.2 X10*3/UL (ref 4.4–11.3)

## 2025-02-04 PROCEDURE — 85027 COMPLETE CBC AUTOMATED: CPT

## 2025-02-04 PROCEDURE — 76705 ECHO EXAM OF ABDOMEN: CPT

## 2025-02-04 PROCEDURE — 80053 COMPREHEN METABOLIC PANEL: CPT

## 2025-02-04 PROCEDURE — RXMED WILLOW AMBULATORY MEDICATION CHARGE

## 2025-02-04 PROCEDURE — 76705 ECHO EXAM OF ABDOMEN: CPT | Performed by: STUDENT IN AN ORGANIZED HEALTH CARE EDUCATION/TRAINING PROGRAM

## 2025-02-04 PROCEDURE — 2500000002 HC RX 250 W HCPCS SELF ADMINISTERED DRUGS (ALT 637 FOR MEDICARE OP, ALT 636 FOR OP/ED)

## 2025-02-04 PROCEDURE — 83735 ASSAY OF MAGNESIUM: CPT

## 2025-02-04 PROCEDURE — 97116 GAIT TRAINING THERAPY: CPT | Mod: GP,CQ

## 2025-02-04 PROCEDURE — 2500000001 HC RX 250 WO HCPCS SELF ADMINISTERED DRUGS (ALT 637 FOR MEDICARE OP)

## 2025-02-04 PROCEDURE — 36415 COLL VENOUS BLD VENIPUNCTURE: CPT

## 2025-02-04 PROCEDURE — 99239 HOSP IP/OBS DSCHRG MGMT >30: CPT | Performed by: STUDENT IN AN ORGANIZED HEALTH CARE EDUCATION/TRAINING PROGRAM

## 2025-02-04 RX ORDER — OXYCODONE AND ACETAMINOPHEN 5; 325 MG/1; MG/1
2 TABLET ORAL EVERY 6 HOURS PRN
Qty: 5 TABLET | Refills: 0 | Status: CANCELLED | OUTPATIENT
Start: 2025-02-04

## 2025-02-04 RX ORDER — TRAMADOL HYDROCHLORIDE 50 MG/1
50 TABLET ORAL EVERY 6 HOURS PRN
Qty: 28 TABLET | Refills: 0 | Status: CANCELLED | OUTPATIENT
Start: 2025-02-04

## 2025-02-04 RX ORDER — OXYCODONE AND ACETAMINOPHEN 5; 325 MG/1; MG/1
1 TABLET ORAL EVERY 6 HOURS PRN
Qty: 20 TABLET | Refills: 0 | Status: SHIPPED | OUTPATIENT
Start: 2025-02-04

## 2025-02-04 RX ADMIN — APIXABAN 10 MG: 5 TABLET, FILM COATED ORAL at 09:10

## 2025-02-04 RX ADMIN — OXYCODONE HYDROCHLORIDE AND ACETAMINOPHEN 1 TABLET: 5; 325 TABLET ORAL at 13:12

## 2025-02-04 RX ADMIN — PANTOPRAZOLE SODIUM 40 MG: 40 TABLET, DELAYED RELEASE ORAL at 09:09

## 2025-02-04 RX ADMIN — ESCITALOPRAM OXALATE 20 MG: 20 TABLET ORAL at 09:10

## 2025-02-04 RX ADMIN — ROSUVASTATIN CALCIUM 20 MG: 20 TABLET, FILM COATED ORAL at 09:10

## 2025-02-04 RX ADMIN — OXYCODONE HYDROCHLORIDE AND ACETAMINOPHEN 2 TABLET: 5; 325 TABLET ORAL at 06:51

## 2025-02-04 RX ADMIN — VENLAFAXINE HYDROCHLORIDE 75 MG: 75 CAPSULE, EXTENDED RELEASE ORAL at 09:09

## 2025-02-04 RX ADMIN — HYDROCHLOROTHIAZIDE: 25 TABLET ORAL at 09:09

## 2025-02-04 RX ADMIN — GABAPENTIN 100 MG: 100 CAPSULE ORAL at 09:09

## 2025-02-04 RX ADMIN — DOCUSATE SODIUM 100 MG: 100 CAPSULE, LIQUID FILLED ORAL at 09:10

## 2025-02-04 RX ADMIN — OXYCODONE HYDROCHLORIDE AND ACETAMINOPHEN 2 TABLET: 5; 325 TABLET ORAL at 00:09

## 2025-02-04 ASSESSMENT — COGNITIVE AND FUNCTIONAL STATUS - GENERAL
WALKING IN HOSPITAL ROOM: A LITTLE
MOBILITY SCORE: 17
DRESSING REGULAR UPPER BODY CLOTHING: A LITTLE
CLIMB 3 TO 5 STEPS WITH RAILING: A LOT
STANDING UP FROM CHAIR USING ARMS: A LITTLE
MOVING FROM LYING ON BACK TO SITTING ON SIDE OF FLAT BED WITH BEDRAILS: A LITTLE
CLIMB 3 TO 5 STEPS WITH RAILING: A LITTLE
STANDING UP FROM CHAIR USING ARMS: A LITTLE
MOVING TO AND FROM BED TO CHAIR: A LITTLE
DAILY ACTIVITIY SCORE: 20
HELP NEEDED FOR BATHING: A LITTLE
EATING MEALS: A LITTLE
DRESSING REGULAR LOWER BODY CLOTHING: A LITTLE
WALKING IN HOSPITAL ROOM: A LITTLE
MOBILITY SCORE: 20
MOVING TO AND FROM BED TO CHAIR: A LITTLE
TURNING FROM BACK TO SIDE WHILE IN FLAT BAD: A LITTLE

## 2025-02-04 ASSESSMENT — PAIN - FUNCTIONAL ASSESSMENT
PAIN_FUNCTIONAL_ASSESSMENT: 0-10

## 2025-02-04 ASSESSMENT — PAIN SCALES - GENERAL
PAINLEVEL_OUTOF10: 5 - MODERATE PAIN
PAINLEVEL_OUTOF10: 3
PAINLEVEL_OUTOF10: 8
PAINLEVEL_OUTOF10: 4
PAINLEVEL_OUTOF10: 2
PAINLEVEL_OUTOF10: 4
PAINLEVEL_OUTOF10: 7

## 2025-02-04 ASSESSMENT — PAIN DESCRIPTION - LOCATION
LOCATION: BACK

## 2025-02-04 NOTE — DISCHARGE INSTRUCTIONS
You were seen in the hospital for pulmonary embolism secondary to DVT.  Please follow up with your PCP in the next week regarding hospitalization stay.     Please take all of your medications as directed.     New medications:  Please start taking Eliquis 10 mg 2 times daily till 2/9/2025 then take Eliquis 5 mg 2 times daily thereafter for the next 6 months  Please take Percocet 1 tablet by mouth every 6 hours as needed for moderate pain (4-6)  Please take Percocet 2 tablets by mouth every 6 hours as needed for severe pain (7-10)      If you have any worsening symptoms such as chest pain, shortness of breath, lightheadedness, dizziness, left leg pain/swelling, or concerning symptoms, please call your doctor or return to the hospital.    Thank you for allowing us to participate in your health care.    -[facility] Inpatient Medicine Teaching Service.

## 2025-02-04 NOTE — NURSING NOTE
2300: assumed care of patient  Pt rested quietly through night, medicated for discomfort as needed. VSS. Call light in reach, safety maintained.

## 2025-02-04 NOTE — PROGRESS NOTES
"Physical Therapy    Physical Therapy Treatment    Patient Name: Abhishek Olson \"Jaron\"  MRN: [id number]  Today's Date: 2/4/2025  Time Calculation  Start Time: 1040  Stop Time: 1105  Time Calculation (min): 25 min     3028/3028-A       02/04/25 1040   PT  Visit   PT Received On 02/04/25   Response to Previous Treatment Patient with no complaints from previous session.   General   Reason for Referral P/w chest pain. Pt states he woke up with a constant chest pain at 3 AM yesterday associated with right mid back pain and SOB. He says the pain was initially located in the middle of his chest and now moved across his chest.  He described the pain as intermittently sharp and dull. CTA chest shows several segmental pulmonary emboli in the right upper lobe and in the right lower lobe. Pt also found to have LLE DVT. Pt admitted for PE.   Referred By Dr. Perez   Past Medical History Relevant to Rehab HTN, HLD, GABRIELA on CPAP, Anxiety, s/p R TKA 1/20/25   Family/Caregiver Present No   Prior to Session Communication Bedside nurse   Patient Position Received Bed, 3 rail up   Preferred Learning Style verbal   General Comment Pt pleasant and agreeable to work with therapy.   Precautions   LE Weight Bearing Status Weight Bearing as Tolerated   Medical Precautions Fall precautions   Post-Surgical Precautions Right total knee precautions   Pain Assessment   Pain Assessment 0-10   0-10 (Numeric) Pain Score 2   Pain Type Acute pain   Pain Location Knee   Pain Orientation Right   Pain Interventions   (Movement)   Response to Interventions No change in pain   Cognition   Overall Cognitive Status WFL   Orientation Level Oriented X4   Processing Speed WFL   Coordination   Movements are Fluid and Coordinated Yes   Bed Mobility 1   Bed Mobility 1 Supine to sitting;Scooting   Level of Assistance 1 Modified independent   Bed Mobility 2   Bed Mobility  2 Sitting to supine   Level of Assistance 2 Modified independent   Ambulation/Gait Training "   Ambulation/Gait Training Performed Yes   Ambulation/Gait Training 1   Surface 1 Level tile   Device 1 Single point cane   Gait Support Devices Gait belt   Assistance 1 Close supervision;Minimal verbal cues   Comments/Distance (ft) 1 Pt ambulates 100' x2   Transfers   Transfer Yes   Transfer 1   Transfer From 1 Bed to   Transfer to 1 Stand   Technique 1 Sit to stand;Stand to sit   Transfer Device 1 Cane;Gait belt   Transfer Level of Assistance 1 Close supervision   Trials/Comments 1 x2   Stairs   Stairs Yes   Stairs   Rails 1 Left   Curb Step 1 No   Device 1 Single point cane   Support Devices 1 Gait belt   Assistance 1 Close supervision   Comment/Number of Steps 1 Pt ascends and descends 12 steps with close supervision.   Activity Tolerance   Endurance Endurance does not limit participation in activity   Early Mobility/Exercise Safety Screen Proceed with mobilization - No exclusion criteria met   PT Assessment   PT Assessment Results Decreased strength;Decreased mobility   Rehab Prognosis Good   Evaluation/Treatment Tolerance Patient tolerated treatment well   End of Session Communication Bedside nurse   Assessment Comment Pt displays weakness and decreased endurance with ambulation due to deconditioning. Pt will continue to benefit from PT interventions to improve overall strength and decrease pain in R LE.   End of Session Patient Position Bed, 3 rail up   Outpatient Education   Individual(s) Educated Patient   Education Provided Body Mechanics   Risk and Benefits Discussed with Patient/Caregiver/Other yes   Patient/Caregiver Demonstrated Understanding yes   Plan of Care Discussed and Agreed Upon yes   Patient Response to Education Patient/Caregiver Verbalized Understanding of Information   PT Plan   Inpatient/Swing Bed or Outpatient Inpatient   PT Plan   Treatment/Interventions Bed mobility;Transfer training;Gait training;Stair training   PT Plan Ongoing PT   PT Frequency One time follow up visit   Equipment  Recommended upon Discharge Straight cane   PT Recommended Transfer Status Stand by assist   PT - OK to Discharge Yes         Outcome Measures:  [facility] Basic Mobility  Turning from your back to your side while in a flat bed without using bedrails: None  Moving from lying on your back to sitting on the side of a flat bed without using bedrails: None  Moving to and from bed to chair (including a wheelchair): A little  Standing up from a chair using your arms (e.g. wheelchair or bedside chair): A little  To walk in hospital room: A little  Climbing 3-5 steps with railing: A little  Basic Mobility - Total Score: 20     EDUCATION:  Outpatient Education  Individual(s) Educated: Patient  Education Provided: Body Mechanics  Risk and Benefits Discussed with Patient/Caregiver/Other: yes  Patient/Caregiver Demonstrated Understanding: yes  Plan of Care Discussed and Agreed Upon: yes  Patient Response to Education: Patient/Caregiver Verbalized Understanding of Information    GOALS:  Encounter Problems       Encounter Problems (Active)       Mobility       Pt will be able to ambulate >/= 300 ft with LRD distant supervision with good safety awareness.        Start:  02/03/25    Expected End:  02/17/25            Pt will be able to negotiate ascending/descending 12 DENISHA with use of unilateral HR SBA with sufficient foot clearance and good safety awareness for safe home going.         Start:  02/03/25    Expected End:  02/17/25            Pt will complete supine, seated and standing exercises to maintain/improve overall strength with minimal verbal cues.         Start:  02/03/25    Expected End:  02/17/25               PT Transfers       Pt will be able to complete all transfers with LRD mod I demonstrating good safety awareness and proper body mechanics.        Start:  02/03/25    Expected End:  02/17/25               Pain - Adult              I personally have reviewed data entered by the student into the flowsheet and agree with  this treatment session of this patient.    Armando Ferrari, Senior PTA, [employer]

## 2025-02-04 NOTE — CARE PLAN
Discharge  Note Pt's PIV and tele have been removed prior to discharge. Pt is discharged home at this time with wife. Wife will be transporting pt home and all belongings have been returned to pt and transported with pt and wife at discharge. Pt has been educated on hospital discharge instructions, medication changes and after visit appointments. Residents have been notified to come talk to pt and wife before they leave per pt's request.     The patient's goals for the shift include      The clinical goals for the shift include see plan of care

## 2025-02-04 NOTE — DISCHARGE SUMMARY
Discharge Diagnosis  Acute subsegmental pulmonary embolism  Pleuritic chest pain  Transaminitis  Right knee pain s/p total arthroplasty    Issues Requiring Follow-Up  Follow-up with your primary care provider regarding your most recent hospital admission.  Follow-up with your orthopedic surgeon regarding any adjustment to pain medication including meloxicam.    Discharge Meds     Medication List      START taking these medications     apixaban 5 mg tablet; Commonly known as: Eliquis; Take 2 tablets (10 mg)   by mouth 2 times a day for 6 days, THEN 1 tablet (5 mg) 2 times a day.;   Start taking on: February 4, 2025   oxyCODONE-acetaminophen 5-325 mg tablet; Commonly known as: Percocet;   Take 1 tablet by mouth every 6 hours if needed for moderate pain (4 - 6).   Take 2 tablet by mouth every 6 hours if needed for severe pain (7-10)     CHANGE how you take these medications     lisinopriL-hydrochlorothiazide 20-25 mg tablet; Take 1 tablet by mouth   once daily.; What changed: Another medication with the same name was   removed. Continue taking this medication, and follow the directions you   see here.     CONTINUE taking these medications     docusate sodium 100 mg capsule; Commonly known as: Colace; Take 1   capsule (100 mg) by mouth 2 times a day.   escitalopram 20 mg tablet; Commonly known as: Lexapro; Take 1 tablet (20   mg) by mouth once daily.   gabapentin 100 mg capsule; Commonly known as: Neurontin   indomethacin SR 75 mg ER capsule; Commonly known as: Indocin SR   pantoprazole 40 mg EC tablet; Commonly known as: ProtoNix; Take 1 tablet   (40 mg) by mouth once daily. Do not crush, chew, or split.   polyethylene glycol 17 gram/dose powder; Commonly known as: Glycolax,   Miralax; Mix 1 cap (17g) into 8 ounces of fluid.   rosuvastatin 20 mg tablet; Commonly known as: Crestor; TAKE ONE TABLET   BY MOUTH DAILY   venlafaxine XR 75 mg 24 hr capsule; Commonly known as: Effexor-XR     STOP taking these medications      "acetaminophen 500 mg tablet; Commonly known as: Tylenol Extra Strength   aspirin 81 mg EC tablet   meloxicam 15 mg tablet; Commonly known as: Mobic   oxyCODONE 5 mg immediate release tablet; Commonly known as: Roxicodone   tadalafil 20 mg tablet; Commonly known as: Cialis   testosterone 20.25 mg/1.25 gram (1.62 %) gel in metered-dose pump   traMADol 50 mg tablet; Commonly known as: Ultram       Test Results Pending At Discharge  Pending Labs       No current pending labs.            Hospital Course  Abhishek Olson \"Nir" is a 64 y.o. male with past medical history of hypertension, hyperlipidemia, and GABRIELA on CPAP, who presents with acute chest pain radiating to the right mid back and accompanied by shortness of breath.  On presentation the patient was tachycardic and tachypneic with oxygen saturation dropping to 90% requiring 2 L oxygen supply through nasal cannula.  He did not have any leukocytosis or electrolyte abnormalities.  However he did have transaminitis (elevated alk phos, AST, ALT).  His EKG did not show significant abnormalities.  Due to recent history of knee surgery in addition to physical exam showing lower extremity edema and lower extremity swelling, CT angio chest was ordered which showed acute subsegmental pulmonary embolism with patchy groundglass opacities in the medial aspect of the right lower lobe associated with pleural effusion.  Lower extremity ultrasound was positive for DVT in left posterior tibial vein.  Patient was initially started on Lovenox which was later switched to Eliquis to be continued for 6 months.  He also received pain medication for management of pleuritic chest pain.  Patient was educated about pulmonary embolism, DVT, and precautions for blood thinners.    Of note, patient initially received Rocephin 2 g for suspected pneumonia which was later ruled out and discontinued.    Due to elevated liver enzyme patient had right upper quadrant ultrasound which showed signs of " hepatic steatosis.  Given improvement in his liver function tests he was deemed fit for discharge with a follow-up with his primary care provider in this regard.    Patient was discharged on Eliquis to be continued for 6 months in addition to Percocet for pain management.    Pertinent Physical Exam At Time of Discharge  Physical Exam  Constitutional:       Appearance: Normal appearance. He is normal weight.   HENT:      Head: Normocephalic and atraumatic.      Right Ear: External ear normal.      Left Ear: External ear normal.      Nose: Nose normal.      Mouth/Throat:      Mouth: Mucous membranes are moist.      Pharynx: Oropharynx is clear.   Eyes:      Extraocular Movements: Extraocular movements intact.      Conjunctiva/sclera: Conjunctivae normal.      Pupils: Pupils are equal, round, and reactive to light.   Cardiovascular:      Rate and Rhythm: Normal rate and regular rhythm.   Pulmonary:      Effort: Pulmonary effort is normal.      Breath sounds: Normal breath sounds.   Abdominal:      General: Abdomen is flat. Bowel sounds are normal.      Palpations: Abdomen is soft.   Musculoskeletal:         General: Normal range of motion.      Cervical back: Normal range of motion and neck supple.   Skin:     General: Skin is warm and dry.   Neurological:      General: No focal deficit present.      Mental Status: He is alert and oriented to person, place, and time.   Psychiatric:         Mood and Affect: Mood normal.         Behavior: Behavior normal.         Outpatient Follow-Up  Future Appointments   Date Time Provider Department Center   2/24/2025  9:40 AM Dakotah Frey DO [id number] [place]   3/6/2025 11:00 AM Tanja Parry PA-C [id number] [facility]   4/14/2025  9:00 AM Jens Boone DO [id number] [place]         EMILY DIAZ MD

## 2025-02-04 NOTE — PROGRESS NOTES
"Physical Therapy                 Therapy Communication Note    Patient Name: Abhishek Olson \"Jaron\"  MRN: [id number]  Department: [facility] 3   Room: [address] [place], [place] [address]  Today's Date: 2/4/2025     Discipline: Physical Therapy    PT Missed Visit: Yes     Missed Visit Reason: Missed Visit Reason: Other (Comment) (multiple attempts made to see patient for PT.  each time patient in the bathroom with no clear time when he will be available.)    Missed Time: Attempt    Comment:  "

## 2025-02-04 NOTE — CARE PLAN
The patient's goals for the shift include      The clinical goals for the shift include   Problem: Pain - Adult  Goal: Verbalizes/displays adequate comfort level or baseline comfort level  Outcome: Progressing     Problem: Safety - Adult  Goal: Free from fall injury  Outcome: Progressing     Problem: Discharge Planning  Goal: Discharge to home or other facility with appropriate resources  Outcome: Progressing     Problem: Chronic Conditions and Co-morbidities  Goal: Patient's chronic conditions and co-morbidity symptoms are monitored and maintained or improved  Outcome: Progressing     Problem: Nutrition  Goal: Nutrient intake appropriate for maintaining nutritional needs  Outcome: Progressing     Problem: Fall/Injury  Goal: Not fall by end of shift  Outcome: Progressing  Goal: Be free from injury by end of the shift  Outcome: Progressing  Goal: Verbalize understanding of personal risk factors for fall in the hospital  Outcome: Progressing  Goal: Verbalize understanding of risk factor reduction measures to prevent injury from fall in the home  Outcome: Progressing     Problem: Pain  Goal: Takes deep breaths with improved pain control throughout the shift  Outcome: Progressing  Goal: Turns in bed with improved pain control throughout the shift  Outcome: Progressing  Goal: Walks with improved pain control throughout the shift  Outcome: Progressing     Problem: Skin  Goal: Participates in plan/prevention/treatment measures  Outcome: Progressing  Goal: Prevent/manage excess moisture  Outcome: Progressing  Goal: Prevent/minimize sheer/friction injuries  Outcome: Progressing

## 2025-02-11 DIAGNOSIS — Z96.651 S/P TKR (TOTAL KNEE REPLACEMENT) USING CEMENT, RIGHT: Primary | ICD-10-CM

## 2025-02-11 DIAGNOSIS — R07.9 CHEST PAIN, UNSPECIFIED TYPE: ICD-10-CM

## 2025-02-11 DIAGNOSIS — I26.99 OTHER ACUTE PULMONARY EMBOLISM, UNSPECIFIED WHETHER ACUTE COR PULMONALE PRESENT (MULTI): ICD-10-CM

## 2025-02-11 RX ORDER — OXYCODONE HYDROCHLORIDE 5 MG/1
5 TABLET ORAL EVERY 6 HOURS PRN
Qty: 15 TABLET | Refills: 0 | Status: SHIPPED | OUTPATIENT
Start: 2025-02-11 | End: 2025-02-18

## 2025-02-13 ENCOUNTER — HOSPITAL ENCOUNTER (EMERGENCY)
Facility: HOSPITAL | Age: 65
Discharge: HOME | End: 2025-02-13
Attending: EMERGENCY MEDICINE
Payer: COMMERCIAL

## 2025-02-13 ENCOUNTER — APPOINTMENT (OUTPATIENT)
Dept: RADIOLOGY | Facility: HOSPITAL | Age: 65
End: 2025-02-13
Payer: COMMERCIAL

## 2025-02-13 ENCOUNTER — APPOINTMENT (OUTPATIENT)
Dept: CARDIOLOGY | Facility: HOSPITAL | Age: 65
End: 2025-02-13
Payer: COMMERCIAL

## 2025-02-13 VITALS
DIASTOLIC BLOOD PRESSURE: 77 MMHG | OXYGEN SATURATION: 100 % | BODY MASS INDEX: 33.39 KG/M2 | HEIGHT: 74 IN | TEMPERATURE: 98.6 F | RESPIRATION RATE: 18 BRPM | SYSTOLIC BLOOD PRESSURE: 124 MMHG | HEART RATE: 79 BPM | WEIGHT: 260.14 LBS

## 2025-02-13 DIAGNOSIS — J18.9 PNEUMONIA OF RIGHT LOWER LOBE DUE TO INFECTIOUS ORGANISM: Primary | ICD-10-CM

## 2025-02-13 DIAGNOSIS — R07.89 ATYPICAL CHEST PAIN: ICD-10-CM

## 2025-02-13 LAB
ABO GROUP (TYPE) IN BLOOD: NORMAL
ALBUMIN SERPL BCP-MCNC: 4 G/DL (ref 3.4–5)
ALP SERPL-CCNC: 187 U/L (ref 33–136)
ALT SERPL W P-5'-P-CCNC: 115 U/L (ref 10–52)
ANION GAP SERPL CALC-SCNC: 14 MMOL/L (ref 10–20)
ANTIBODY SCREEN: NORMAL
APPEARANCE UR: CLEAR
AST SERPL W P-5'-P-CCNC: 94 U/L (ref 9–39)
BASOPHILS # BLD AUTO: 0.06 X10*3/UL (ref 0–0.1)
BASOPHILS NFR BLD AUTO: 0.9 %
BILIRUB SERPL-MCNC: 0.8 MG/DL (ref 0–1.2)
BILIRUB UR STRIP.AUTO-MCNC: NEGATIVE MG/DL
BUN SERPL-MCNC: 22 MG/DL (ref 6–23)
CALCIUM SERPL-MCNC: 9.3 MG/DL (ref 8.6–10.3)
CARDIAC TROPONIN I PNL SERPL HS: 6 NG/L (ref 0–20)
CARDIAC TROPONIN I PNL SERPL HS: 6 NG/L (ref 0–20)
CHLORIDE SERPL-SCNC: 102 MMOL/L (ref 98–107)
CO2 SERPL-SCNC: 26 MMOL/L (ref 21–32)
COLOR UR: NORMAL
CREAT SERPL-MCNC: 0.83 MG/DL (ref 0.5–1.3)
EGFRCR SERPLBLD CKD-EPI 2021: >90 ML/MIN/1.73M*2
EOSINOPHIL # BLD AUTO: 0.46 X10*3/UL (ref 0–0.7)
EOSINOPHIL NFR BLD AUTO: 6.9 %
ERYTHROCYTE [DISTWIDTH] IN BLOOD BY AUTOMATED COUNT: 12.8 % (ref 11.5–14.5)
FLUAV RNA RESP QL NAA+PROBE: NOT DETECTED
FLUBV RNA RESP QL NAA+PROBE: NOT DETECTED
GLUCOSE SERPL-MCNC: 89 MG/DL (ref 74–99)
GLUCOSE UR STRIP.AUTO-MCNC: NORMAL MG/DL
HCT VFR BLD AUTO: 38.3 % (ref 41–52)
HGB BLD-MCNC: 12.7 G/DL (ref 13.5–17.5)
IMM GRANULOCYTES # BLD AUTO: 0.02 X10*3/UL (ref 0–0.7)
IMM GRANULOCYTES NFR BLD AUTO: 0.3 % (ref 0–0.9)
KETONES UR STRIP.AUTO-MCNC: NEGATIVE MG/DL
LEUKOCYTE ESTERASE UR QL STRIP.AUTO: NEGATIVE
LYMPHOCYTES # BLD AUTO: 1.71 X10*3/UL (ref 1.2–4.8)
LYMPHOCYTES NFR BLD AUTO: 25.6 %
MAGNESIUM SERPL-MCNC: 2.02 MG/DL (ref 1.6–2.4)
MCH RBC QN AUTO: 31.6 PG (ref 26–34)
MCHC RBC AUTO-ENTMCNC: 33.2 G/DL (ref 32–36)
MCV RBC AUTO: 95 FL (ref 80–100)
MONOCYTES # BLD AUTO: 0.79 X10*3/UL (ref 0.1–1)
MONOCYTES NFR BLD AUTO: 11.8 %
NEUTROPHILS # BLD AUTO: 3.64 X10*3/UL (ref 1.2–7.7)
NEUTROPHILS NFR BLD AUTO: 54.5 %
NITRITE UR QL STRIP.AUTO: NEGATIVE
NRBC BLD-RTO: 0 /100 WBCS (ref 0–0)
PH UR STRIP.AUTO: 5.5 [PH]
PLATELET # BLD AUTO: 462 X10*3/UL (ref 150–450)
POTASSIUM SERPL-SCNC: 4.1 MMOL/L (ref 3.5–5.3)
PROT SERPL-MCNC: 6.9 G/DL (ref 6.4–8.2)
PROT UR STRIP.AUTO-MCNC: NEGATIVE MG/DL
RBC # BLD AUTO: 4.02 X10*6/UL (ref 4.5–5.9)
RBC # UR STRIP.AUTO: NEGATIVE MG/DL
RH FACTOR (ANTIGEN D): NORMAL
RSV RNA RESP QL NAA+PROBE: NOT DETECTED
SARS-COV-2 RNA RESP QL NAA+PROBE: NOT DETECTED
SODIUM SERPL-SCNC: 138 MMOL/L (ref 136–145)
SP GR UR STRIP.AUTO: 1.03
UROBILINOGEN UR STRIP.AUTO-MCNC: NORMAL MG/DL
WBC # BLD AUTO: 6.7 X10*3/UL (ref 4.4–11.3)

## 2025-02-13 PROCEDURE — 99285 EMERGENCY DEPT VISIT HI MDM: CPT | Performed by: EMERGENCY MEDICINE

## 2025-02-13 PROCEDURE — 71045 X-RAY EXAM CHEST 1 VIEW: CPT | Performed by: STUDENT IN AN ORGANIZED HEALTH CARE EDUCATION/TRAINING PROGRAM

## 2025-02-13 PROCEDURE — 80053 COMPREHEN METABOLIC PANEL: CPT | Performed by: EMERGENCY MEDICINE

## 2025-02-13 PROCEDURE — 2500000001 HC RX 250 WO HCPCS SELF ADMINISTERED DRUGS (ALT 637 FOR MEDICARE OP): Performed by: EMERGENCY MEDICINE

## 2025-02-13 PROCEDURE — 85025 COMPLETE CBC W/AUTO DIFF WBC: CPT | Performed by: EMERGENCY MEDICINE

## 2025-02-13 PROCEDURE — 93005 ELECTROCARDIOGRAM TRACING: CPT

## 2025-02-13 PROCEDURE — 36415 COLL VENOUS BLD VENIPUNCTURE: CPT | Performed by: EMERGENCY MEDICINE

## 2025-02-13 PROCEDURE — 84484 ASSAY OF TROPONIN QUANT: CPT | Performed by: EMERGENCY MEDICINE

## 2025-02-13 PROCEDURE — 86901 BLOOD TYPING SEROLOGIC RH(D): CPT | Performed by: EMERGENCY MEDICINE

## 2025-02-13 PROCEDURE — 71275 CT ANGIOGRAPHY CHEST: CPT

## 2025-02-13 PROCEDURE — 2550000001 HC RX 255 CONTRASTS: Performed by: EMERGENCY MEDICINE

## 2025-02-13 PROCEDURE — 87637 SARSCOV2&INF A&B&RSV AMP PRB: CPT | Performed by: EMERGENCY MEDICINE

## 2025-02-13 PROCEDURE — 71045 X-RAY EXAM CHEST 1 VIEW: CPT

## 2025-02-13 PROCEDURE — 81003 URINALYSIS AUTO W/O SCOPE: CPT | Performed by: EMERGENCY MEDICINE

## 2025-02-13 PROCEDURE — 83735 ASSAY OF MAGNESIUM: CPT | Performed by: EMERGENCY MEDICINE

## 2025-02-13 PROCEDURE — 99285 EMERGENCY DEPT VISIT HI MDM: CPT | Mod: 25 | Performed by: EMERGENCY MEDICINE

## 2025-02-13 RX ORDER — ACETAMINOPHEN 325 MG/1
650 TABLET ORAL ONCE
Status: COMPLETED | OUTPATIENT
Start: 2025-02-13 | End: 2025-02-13

## 2025-02-13 RX ORDER — OXYCODONE HYDROCHLORIDE 5 MG/1
5 TABLET ORAL ONCE
Status: COMPLETED | OUTPATIENT
Start: 2025-02-13 | End: 2025-02-13

## 2025-02-13 RX ORDER — DOXYCYCLINE 100 MG/1
100 CAPSULE ORAL ONCE
Status: COMPLETED | OUTPATIENT
Start: 2025-02-13 | End: 2025-02-13

## 2025-02-13 RX ORDER — DOXYCYCLINE 100 MG/1
100 CAPSULE ORAL 2 TIMES DAILY
Qty: 20 CAPSULE | Refills: 0 | Status: SHIPPED | OUTPATIENT
Start: 2025-02-13 | End: 2025-02-23

## 2025-02-13 RX ADMIN — DOXYCYCLINE HYCLATE 100 MG: 100 CAPSULE ORAL at 18:38

## 2025-02-13 RX ADMIN — IOHEXOL 90 ML: 350 INJECTION, SOLUTION INTRAVENOUS at 16:51

## 2025-02-13 RX ADMIN — ACETAMINOPHEN 325MG 650 MG: 325 TABLET ORAL at 15:44

## 2025-02-13 RX ADMIN — OXYCODONE HYDROCHLORIDE 5 MG: 5 TABLET ORAL at 15:42

## 2025-02-13 ASSESSMENT — LIFESTYLE VARIABLES
TOTAL SCORE: 0
EVER FELT BAD OR GUILTY ABOUT YOUR DRINKING: NO
EVER HAD A DRINK FIRST THING IN THE MORNING TO STEADY YOUR NERVES TO GET RID OF A HANGOVER: NO
HAVE YOU EVER FELT YOU SHOULD CUT DOWN ON YOUR DRINKING: NO
HAVE PEOPLE ANNOYED YOU BY CRITICIZING YOUR DRINKING: NO

## 2025-02-13 ASSESSMENT — PAIN SCALES - GENERAL
PAINLEVEL_OUTOF10: 0 - NO PAIN
PAINLEVEL_OUTOF10: 0 - NO PAIN
PAINLEVEL_OUTOF10: 4

## 2025-02-13 ASSESSMENT — PAIN DESCRIPTION - LOCATION: LOCATION: BACK

## 2025-02-13 ASSESSMENT — PAIN DESCRIPTION - ORIENTATION: ORIENTATION: RIGHT;LOWER

## 2025-02-13 ASSESSMENT — COLUMBIA-SUICIDE SEVERITY RATING SCALE - C-SSRS
2. HAVE YOU ACTUALLY HAD ANY THOUGHTS OF KILLING YOURSELF?: NO
1. IN THE PAST MONTH, HAVE YOU WISHED YOU WERE DEAD OR WISHED YOU COULD GO TO SLEEP AND NOT WAKE UP?: NO
6. HAVE YOU EVER DONE ANYTHING, STARTED TO DO ANYTHING, OR PREPARED TO DO ANYTHING TO END YOUR LIFE?: NO

## 2025-02-13 ASSESSMENT — PAIN - FUNCTIONAL ASSESSMENT: PAIN_FUNCTIONAL_ASSESSMENT: 0-10

## 2025-02-13 NOTE — ED PROVIDER NOTES
HPI   No chief complaint on file.      Abdullahi is a 64-year-old gentleman who was recently diagnosed with a PE and DVT on February 1 following a total knee arthroplasty of the right knee.  He was admitted to the hospital for 3 days discharged on the fourth and he has been on Eliquis ever since.  He states he has been compliant with his medications.  Over the last few days, he has developed increased right-sided flank pain that is reminiscent of when he was diagnosed with his PE.  He reports some increased shortness of breath and decreased exercise tolerance.  He does not have any pleuritic chest pain and no hemoptysis.  He has no discomfort in his legs.  He has had no change in the color of his surgical wound and no drainage.  Denies any fevers or chills.  Denies any vomiting or diarrhea.  Has had no problems with dark stool or tarry stool.  He does not report any bleeding that is unusual.              Patient History   Past Medical History:   Diagnosis Date   • Anxiety    • Arrhythmia     hx of afib s/p ablation   • Depression    • Essential (primary) hypertension 05/11/2022    Hypertension   • GERD (gastroesophageal reflux disease)    • History of blood transfusion     1984   • Mixed hyperlipidemia     Elevated cholesterol with high triglycerides   • Other specified postprocedural states 06/07/2016    History of colonoscopy   • Personal history of other diseases of urinary system     History of kidney problems   • Post concussive syndrome    • Sleep apnea      Past Surgical History:   Procedure Laterality Date   • ABLATION OF DYSRHYTHMIC FOCUS     • COLONOSCOPY     • HIP ARTHROPLASTY     • OTHER SURGICAL HISTORY  05/03/2019    Hernia repair   • OTHER SURGICAL HISTORY Left 05/03/2019    Orbital blowout fracture repair   • OTHER SURGICAL HISTORY Bilateral 05/03/2019    Hip surgery   • SHOULDER SURGERY Bilateral 03/31/2015    Shoulder Surgery     Family History   Problem Relation Name Age of Onset   • Multiple myeloma  Mother     • Other (pulmonary hypertension) Father     • Hypertension Brother       Social History     Tobacco Use   • Smoking status: Never   • Smokeless tobacco: Never   Vaping Use   • Vaping status: Never Used   Substance Use Topics   • Alcohol use: Not Currently   • Drug use: Yes     Types: Marijuana       Physical Exam   ED Triage Vitals   Temp Pulse Resp BP   -- -- -- --      SpO2 Temp src Heart Rate Source Patient Position   -- -- -- --      BP Location FiO2 (%)     -- --       Physical Exam  Constitutional:       Appearance: Normal appearance.      Comments: Winded with ambulation.   HENT:      Head: Normocephalic.   Eyes:      Pupils: Pupils are equal, round, and reactive to light.   Pulmonary:      Breath sounds: Normal breath sounds.      Comments: Increased work of breathing with exertion  Abdominal:      General: Abdomen is flat.      Palpations: Abdomen is soft.      Tenderness: There is no abdominal tenderness.   Musculoskeletal:         General: Swelling and tenderness present.      Cervical back: Normal range of motion.      Comments: Mild edema appreciated to the left lower extremity.  Wound of right knee has mild rubor, but is clean and dry without discharge.  No tenderness   Skin:     Capillary Refill: Capillary refill of skin of back is 3 seconds  Neurological:      General: No focal deficit present.      Mental Status: He is alert and oriented to person, place, and time.      Motor: No weakness.      Comments: Antalgic gait supported with a cane, walks slow   Psychiatric:         Mood and Affect: Mood normal.         Behavior: Behavior normal.           ED Course & MDM   ED Course as of 02/13/25 1552   Thu Feb 13, 2025   1505 EKG reveals normal sinus rhythm at 82 bpm.  FL is 178 ms and normal, QRS is normal 84 ms, no acute ST changes otherwise.  Flattened T wave in lead III. [GR]      ED Course User Index  [GR] Sebastien Orellana DO                 No data recorded                                  Medical Decision Making  64-year-old man with recent PE and DVT diagnosed.  Now having flank pain that he is associated with a previous PE diagnosis.  Query whether this patient has a prolific gating pulmonary embolism despite anticoagulants.  Concern for right heart strain.  ECG without STEMI.  Low pretest probability of COVID or flu.    Care signed out to Dr. Orellana at 1500        Procedure  Procedures     Marga Irving MD  02/13/25 1750

## 2025-02-13 NOTE — DISCHARGE INSTRUCTIONS
Please take the antibiotics as directed and return to the emergency room for any worsening chest pain, shortness of breath, fevers and chills, or any worsening concerning symptoms.

## 2025-02-14 LAB
ATRIAL RATE: 82 BPM
P AXIS: 40 DEGREES
P OFFSET: 192 MS
P ONSET: 135 MS
PR INTERVAL: 178 MS
Q ONSET: 224 MS
QRS COUNT: 13 BEATS
QRS DURATION: 84 MS
QT INTERVAL: 402 MS
QTC CALCULATION(BAZETT): 469 MS
QTC FREDERICIA: 446 MS
R AXIS: 13 DEGREES
T AXIS: 31 DEGREES
T OFFSET: 425 MS
VENTRICULAR RATE: 82 BPM

## 2025-02-19 NOTE — PROGRESS NOTES
Hospitalist Discharge Summary   Admit Date:  2022  4:36 PM   DC Note date: 2022  Name:  Uli Suazo   Age:  49 y.o. Sex:  female  :  1966   MRN:  [id number]   Room:  [facility]/  PCP:  None Provider    Presenting Complaint: Chest Pain and Vaginal Bleeding     Initial Admission Diagnosis: Vaginal bleeding [N93.9]  Atypical chest pain [R07.89]  Symptomatic anemia [D64.9]     Problem List for this Hospitalization (present on admission):    Principal Problem:    Symptomatic anemia  Active Problems:    Vaginal bleeding    Adenomyosis of uterus    Iron deficiency anemia due to chronic blood loss  Resolved Problems:    Atypical chest pain    Light-headedness    Did Patient have Sepsis (YES OR NO): NO    From H&P:  \"50 y.o. female with medical history of history of fibroid uterus presented to emergency room with chief complaint of vaginal bleeding for 1 month, feels dizzy, lightheaded for past few days. Reports decreased energy, generalized weakness for many months. Patient have outpatient GYN appointment but not available until August.  In emergency room basic labs were obtained, labs shows evidence of hemoglobin 6.5, patient was ordered 1 unit of blood transfusion, plan to discharge but when patient went to ambulate still feeling lightheaded. Patient also reports intermittent chest discomfort for past 2 weeks duration, nonexertional, substernal, nonradiating, comes and goes. EKG that was done in emergency room did not show any ST elevations or depressions. Troponins x2 negative. Emergency room physician/PA requested observation of this patient for further evaluation. She was admitted and give 1U PRBC. Her Hgb came up to 1U PRBC. She was given 1000mg Infed. Gyn Onc evaluated her and recommended Lupron. Vaginal exam was normal. She remained stable and her symptoms improved. Orthostatics were normal. She was discharged home. \"    Hospital Course:   She was admitted and give 1U PRBC.  Her Hgb came up to
I received signout from Dr. Irving, interviewed the patient, agree with the findings in her note.  The patient be worked up for pulmonary embolism as he is only been on anticoagulation for about 2 weeks, and could have an unstable clot or a clot that is yet to be stabilized that may have caused a pulmonary embolism.    The patient's heart enzymes were negative x 2, no sign of heart strain.  EKG reveals no acute changes.  CT scan does show pleural effusion.  Note this may be what he is feeling as far as his pain.  Is the area of pain, it may have been a small pulmonary infarct that is now turned to joint effusion versus pneumonia.  Therefore the patient be started on doxycycline antibiotic, he states he has pain control at home.  I notified the patient to return to the emergency mediately for any worsening chest pain, shortness of breath, fevers and chills, difficulty breathing, or any worsening concerning symptoms.  Otherwise follow-up with primary doctor as directed.  
Imaging/Tests:   US PELVIS COMPLETE    Result Date: 7/4/2022  PELVIC ULTRASOUND. HISTORY:  Low abdomen pain and abnormal bleeding TECHNIQUE:  Transabdominal and endovaginal  images were obtained of the pelvis. FINDINGS:  - UTERUS:  11.4 cm in length. Endometrial stripe measures 5 mm. There is heterogeneity of the myometrium. No discrete myometrial or endometrial mass is seen. Neither ovary is identified. No definite adnexal mass. No significant free fluid. 1.  Heterogeneous appearance of the myometrium. No discrete mass identified. 2.  Nonvisualization of both ovaries. XR CHEST PORTABLE    Result Date: 7/4/2022  Chest X-ray INDICATION: Left-sided chest pain for several days A portable AP view of the chest was obtained. FINDINGS: The lungs are clear. There are no infiltrates or effusions. The heart size is normal.  The bony thorax is intact.       No acute findings in the chest         Labs: Results:       BMP, Mg, Phos Recent Labs     07/04/22  1559 07/05/22  0742    142   K 4.0 3.8   * 109*   CO2 27 27   ANIONGAP 2* 6*   BUN 10 9   CREATININE 0.80 0.70   LABGLOM >60 >60   GFRAA >60 >60   CALCIUM 9.4 9.2   GLUCOSE 95 93      CBC Recent Labs     07/04/22  1559 07/04/22  1559 07/04/22  2319 07/05/22  0742 07/06/22  0340   WBC 8.5  --   --  6.9  --    RBC 3.88*  --   --  4.32  --    HGB 6.5*   < > 7.2* 8.1* 8.2*   HCT 24.4*   < > 26.5* 28.9* 29.2*   MCV 62.9*  --   --  66.9*  --    MCH 16.8*  --   --  18.8*  --    MCHC 26.6*  --   --  28.0*  --    RDW 21.1*  --   --  23.9*  --      --   --  280  --    MPV 9.9  --   --  10.0  --    NRBC 0.00  --   --  0.00  --    SEGS  --   --   --  65  --    LYMPHOPCT  --   --   --  25  --    EOSRELPCT  --   --   --  1  --    MONOPCT  --   --   --  8  --    BASOPCT  --   --   --  1  --    IMMGRAN  --   --   --  0  --    SEGSABS  --   --   --  4.4  --    LYMPHSABS  --   --   --  1.7  --    EOSABS  --   --   --  0.1  --    MONOSABS  --   --   --  0.6  --
BASOSABS  --   --   --  0.1  --    ABSIMMGRAN  --   --   --  0.0  --     < > = values in this interval not displayed. LFT Recent Labs     07/04/22  1559 07/05/22  0742   BILITOT 0.3 0.4   ALKPHOS 92 81   AST 17 17   ALT 17 14   PROT 7.9 7.2   LABALBU 3.4* 3.1*   GLOB 4.5* 4.1*      Cardiac  Lab Results   Component Value Date/Time    TROPHS 3.7 07/05/2022 12:38 PM    TROPHS 4.1 07/05/2022 07:42 AM    TROPHS 3.4 07/04/2022 06:07 PM      Coags No results found for: PROTIME, INR, APTT   A1c No results found for: LABA1C, EAG   Lipids No results found for: CHOL, LDLCALC, LABVLDL, HDL, CHOLHDLRATIO, TRIG   Thyroid  No results found for: Heydi Louann     Most Recent UA No results found for: COLORU, APPEARANCE, SPECGRAV, LABPH, PROTEINU, GLUCOSEU, KETUA, BILIRUBINUR, BLOODU, UROBILINOGEN, NITRU, LEUKOCYTESUR, WBCUA, RBCUA, EPITHUA, BACTERIA, LABCAST, MUCUS       All Labs from Last 24 Hrs:  Recent Results (from the past 24 hour(s))   EKG 12 Lead    Collection Time: 07/05/22  5:59 PM   Result Value Ref Range    Ventricular Rate 61 BPM    Atrial Rate 61 BPM    P-R Interval 144 ms    QRS Duration 76 ms    Q-T Interval 408 ms    QTc Calculation (Bazett) 410 ms    P Axis 50 degrees    R Axis -19 degrees    T Axis 16 degrees    Diagnosis Normal sinus rhythm    Hemoglobin and Hematocrit    Collection Time: 07/06/22  3:40 AM   Result Value Ref Range    Hemoglobin 8.2 (L) 11.7 - 15.4 g/dL    Hematocrit 29.2 (L) 35.8 - 46.3 %       No Known Allergies    There is no immunization history on file for this patient.     Recent Vital Data:  Patient Vitals for the past 24 hrs:   Temp Pulse Resp BP SpO2   07/06/22 1357 97.7 °F (36.5 °C) 51 20 (!) 140/77 99 %   07/06/22 0740 98.4 °F (36.9 °C) 65 20 132/76 --   07/06/22 0520 98.7 °F (37.1 °C) 67 18 131/72 100 %   07/06/22 0028 98.9 °F (37.2 °C) 56 18 126/72 100 %   07/05/22 2044 99.3 °F (37.4 °C) 57 18 134/72 98 %   07/05/22 1844 -- -- -- (!) 158/80 --   07/05/22 1751 -- 79 18 (!)
169/102 --   07/05/22 1750 -- 92 18 (!) 151/82 --   07/05/22 1749 99 °F (37.2 °C) 62 20 (!) 171/83 --       Oxygen Therapy  SpO2: 99 %  Pulse Oximeter Device Mode: Continuous  O2 Device: None (Room air)    Estimated body mass index is 31.48 kg/m² as calculated from the following:    Height as of this encounter: 5' 4\" (1.626 m). Weight as of this encounter: 183 lb 6.4 oz (83.2 kg). Intake/Output Summary (Last 24 hours) at 7/6/2022 1710  Last data filed at 7/6/2022 1357  Gross per 24 hour   Intake 540 ml   Output --   Net 540 ml         Physical Exam:  General:    Well nourished. No overt distress  Head:  Normocephalic, atraumatic  Eyes:  Sclerae appear normal.  Pupils equally round. HENT:  Nares appear normal, no drainage. Moist mucous membranes  Neck:  No restricted ROM. Trachea midline  CV:   RRR. No m/r/g. No JVD  Lungs:   CTAB. No wheezing, rhonchi, or rales. Respirations even, unlabored  Abdomen:   Soft, nontender, nondistended. Extremities: Warm and dry. No cyanosis or clubbing. No edema. Skin:     No rashes. Normal coloration  Neuro:  CN II-XII grossly intact. Psych:  Normal mood and affect. Signed:  Rian Garcia DO    Part of this note may have been written by using a voice dictation software. The note has been proof read but may still contain some grammatical/other typographical errors.
The patient is a 13y Male complaining of lacerations.

## 2025-02-24 ENCOUNTER — APPOINTMENT (OUTPATIENT)
Dept: PRIMARY CARE | Facility: CLINIC | Age: 65
End: 2025-02-24
Payer: COMMERCIAL

## 2025-02-24 VITALS
HEIGHT: 74 IN | HEART RATE: 89 BPM | RESPIRATION RATE: 16 BRPM | DIASTOLIC BLOOD PRESSURE: 97 MMHG | OXYGEN SATURATION: 96 % | TEMPERATURE: 97.7 F | BODY MASS INDEX: 34.65 KG/M2 | WEIGHT: 270 LBS | SYSTOLIC BLOOD PRESSURE: 144 MMHG

## 2025-02-24 DIAGNOSIS — Z12.11 ENCOUNTER FOR COLORECTAL CANCER SCREENING: Primary | ICD-10-CM

## 2025-02-24 DIAGNOSIS — M17.11 UNILATERAL PRIMARY OSTEOARTHRITIS, RIGHT KNEE: ICD-10-CM

## 2025-02-24 DIAGNOSIS — Z23 ENCOUNTER FOR IMMUNIZATION: ICD-10-CM

## 2025-02-24 DIAGNOSIS — Z12.12 ENCOUNTER FOR COLORECTAL CANCER SCREENING: Primary | ICD-10-CM

## 2025-02-24 DIAGNOSIS — I48.3 TYPICAL ATRIAL FLUTTER (MULTI): ICD-10-CM

## 2025-02-24 PROCEDURE — 3077F SYST BP >= 140 MM HG: CPT | Performed by: STUDENT IN AN ORGANIZED HEALTH CARE EDUCATION/TRAINING PROGRAM

## 2025-02-24 PROCEDURE — 90471 IMMUNIZATION ADMIN: CPT | Performed by: STUDENT IN AN ORGANIZED HEALTH CARE EDUCATION/TRAINING PROGRAM

## 2025-02-24 PROCEDURE — 90715 TDAP VACCINE 7 YRS/> IM: CPT | Performed by: STUDENT IN AN ORGANIZED HEALTH CARE EDUCATION/TRAINING PROGRAM

## 2025-02-24 PROCEDURE — 3080F DIAST BP >= 90 MM HG: CPT | Performed by: STUDENT IN AN ORGANIZED HEALTH CARE EDUCATION/TRAINING PROGRAM

## 2025-02-24 PROCEDURE — 3008F BODY MASS INDEX DOCD: CPT | Performed by: STUDENT IN AN ORGANIZED HEALTH CARE EDUCATION/TRAINING PROGRAM

## 2025-02-24 PROCEDURE — 99386 PREV VISIT NEW AGE 40-64: CPT | Performed by: STUDENT IN AN ORGANIZED HEALTH CARE EDUCATION/TRAINING PROGRAM

## 2025-02-24 PROCEDURE — 1036F TOBACCO NON-USER: CPT | Performed by: STUDENT IN AN ORGANIZED HEALTH CARE EDUCATION/TRAINING PROGRAM

## 2025-02-24 PROCEDURE — 99213 OFFICE O/P EST LOW 20 MIN: CPT | Performed by: STUDENT IN AN ORGANIZED HEALTH CARE EDUCATION/TRAINING PROGRAM

## 2025-02-24 PROCEDURE — 90656 IIV3 VACC NO PRSV 0.5 ML IM: CPT | Performed by: STUDENT IN AN ORGANIZED HEALTH CARE EDUCATION/TRAINING PROGRAM

## 2025-02-24 PROCEDURE — 90472 IMMUNIZATION ADMIN EACH ADD: CPT | Performed by: STUDENT IN AN ORGANIZED HEALTH CARE EDUCATION/TRAINING PROGRAM

## 2025-02-24 RX ORDER — GABAPENTIN 300 MG/1
300 CAPSULE ORAL 3 TIMES DAILY
Qty: 180 CAPSULE | Refills: 1 | Status: SHIPPED | OUTPATIENT
Start: 2025-02-24

## 2025-02-24 SDOH — ECONOMIC STABILITY: GENERAL
WHICH OF THE FOLLOWING WOULD YOU LIKE TO GET CONNECTED TO IN ORDER TO RECEIVE A DISCOUNT OR FOR FREE? (CHOOSE ALL THAT APPLY): NO ASSISTANCE NEEDED

## 2025-02-24 SDOH — HEALTH STABILITY: PHYSICAL HEALTH: ON AVERAGE, HOW MANY MINUTES DO YOU ENGAGE IN EXERCISE AT THIS LEVEL?: 40 MIN

## 2025-02-24 SDOH — ECONOMIC STABILITY: GENERAL
WHICH OF THE FOLLOWING DO YOU KNOW HOW TO USE AND HAVE ACCESS TO EVERY DAY? (CHOOSE ALL THAT APPLY): SMARTPHONE WITH CELLULAR DATA PLAN

## 2025-02-24 SDOH — HEALTH STABILITY: PHYSICAL HEALTH: ON AVERAGE, HOW MANY DAYS PER WEEK DO YOU ENGAGE IN MODERATE TO STRENUOUS EXERCISE (LIKE A BRISK WALK)?: 5 DAYS

## 2025-02-24 ASSESSMENT — ENCOUNTER SYMPTOMS
LOSS OF SENSATION IN FEET: 0
OCCASIONAL FEELINGS OF UNSTEADINESS: 0
DEPRESSION: 0

## 2025-02-24 NOTE — PROGRESS NOTES
"Ibrahima Olson \"Nir" is a 64 y.o. male who is here for a routine exam/Medicare Annual    Active Problem List      Comprehensive Medical/Surgical/Social/Family History  Past Medical History:   Diagnosis Date    Anxiety     Arrhythmia     hx of afib s/p ablation    Depression     Essential (primary) hypertension 05/11/2022    Hypertension    GERD (gastroesophageal reflux disease)     History of blood transfusion     1984    Mixed hyperlipidemia     Elevated cholesterol with high triglycerides    Other specified postprocedural states 06/07/2016    History of colonoscopy    Personal history of other diseases of urinary system     History of kidney problems    Post concussive syndrome     Sleep apnea      Past Surgical History:   Procedure Laterality Date    ABLATION OF DYSRHYTHMIC FOCUS      COLONOSCOPY      HIP ARTHROPLASTY      OTHER SURGICAL HISTORY  05/03/2019    Hernia repair    OTHER SURGICAL HISTORY Left 05/03/2019    Orbital blowout fracture repair    OTHER SURGICAL HISTORY Bilateral 05/03/2019    Hip surgery    SHOULDER SURGERY Bilateral 03/31/2015    Shoulder Surgery     Social History     Social History Narrative    Not on file       Allergies and Medications  Penicillins  Current Outpatient Medications on File Prior to Visit   Medication Sig Dispense Refill    apixaban (Eliquis) 5 mg tablet Take 2 tablets (10 mg) by mouth 2 times a day for 6 days, THEN 1 tablet (5 mg) 2 times a day. 84 tablet 0    escitalopram (Lexapro) 20 mg tablet Take 1 tablet (20 mg) by mouth once daily. 30 tablet 11    lisinopriL-hydrochlorothiazide 20-25 mg tablet Take 1 tablet by mouth once daily. 90 tablet 4    rosuvastatin (Crestor) 20 mg tablet TAKE ONE TABLET BY MOUTH DAILY 90 tablet 3    venlafaxine XR (Effexor-XR) 75 mg 24 hr capsule Take 1 capsule (75 mg) by mouth once daily.      [DISCONTINUED] gabapentin (Neurontin) 100 mg capsule Take 1 capsule (100 mg) by mouth 3 times a day.      [DISCONTINUED] " oxyCODONE-acetaminophen (Percocet) 5-325 mg tablet Take 1 tablet by mouth every 6 hours if needed for moderate pain (4 - 6). Take 2 tablet by mouth every 6 hours if needed for severe pain (7-10) 20 tablet 0    [] docusate sodium (Colace) 100 mg capsule Take 1 capsule (100 mg) by mouth 2 times a day. 60 capsule 0    [] doxycycline (Vibramycin) 100 mg capsule Take 1 capsule (100 mg) by mouth 2 times a day for 10 days. Take with at least 8 ounces (large glass) of water, do not lie down for 30 minutes after 20 capsule 0    [] oxyCODONE (Roxicodone) 5 mg immediate release tablet Take 1 tablet (5 mg) by mouth every 6 hours if needed for severe pain (7 - 10) for up to 7 days. 15 tablet 0    pantoprazole (ProtoNix) 40 mg EC tablet Take 1 tablet (40 mg) by mouth once daily. Do not crush, chew, or split. 30 tablet 0    polyethylene glycol (Glycolax, Miralax) 17 gram/dose powder Mix 1 cap (17g) into 8 ounces of fluid. (Patient not taking: Mix of powder and drink. Reported on 2025) 238 g 0    [DISCONTINUED] indomethacin SR (Indocin SR) 75 mg ER capsule Take 1 capsule (75 mg) by mouth 2 times a day as needed (for pain). (Patient not taking: Reported on 2025)       No current facility-administered medications on file prior to visit.       Concerns today:  Patient seen today to establish care and for annual wellness visit.  Patient is new to our practice he has been in the hospital after a knee replacement with complication of DVT is currently on Eliquis, did have pulmonary embolism as well.  Patient did have pneumonia following this, he is overall doing better now but is not back up to his baseline in terms of physical activity or energy.    Patient's previous  for the [employer] he recently retired, he does have chronic pain in his low back following an accident that occurred in the .    Patient is concerned for his weight and energy, he is interested in starting. A GLP-1 for  "weight loss, but he is currently having sedentary lifestyle due to his pulmonary embolism and recent knee replacement, he is content with home therapy.      Lifestyle  Diet: Healthy and Well Balanced  Physical Activity: Sufficiently Active (2/24/2025)    Exercise Vital Sign     Days of Exercise per Week: 5 days     Minutes of Exercise per Session: 40 min      Tobacco Use: Low Risk  (2/24/2025)    Patient History     Smoking Tobacco Use: Never     Smokeless Tobacco Use: Never     Passive Exposure: Not on file      Alcohol Use: Alcohol Misuse (2/2/2025)    AUDIT-C     Frequency of Alcohol Consumption: 2-3 times a week     Average Number of Drinks: 1 or 2     Frequency of Binge Drinking: Less than monthly      Depression: Not at risk (2/2/2025)    PHQ-2     PHQ-2 Score: 0      Stress: No Stress Concern Present (2/24/2025)    [employer] - Occupational Stress Questionnaire     Feeling of Stress : Not at all         Consultants:  Patient Care Team:  Cedrick Hernandez MD as PCP - General  Jens Boone DO as Consulting Physician (Neurology)  Emelia Danielson RN as Registered Nurse (Orthopaedic Surgery)  Tanja Parry PA-C as Physician Assistant (Orthopaedic Surgery)   Dr. Galvan - mental health provider    Colorectal Screening:   colonoscopy  Date: 2022    Nocturia: .Sparse  Erectile dysfunction: .Did not discuss    Review of Systems    Objective   BP (!) 144/97 (BP Location: Right arm, Patient Position: Sitting)   Pulse 89   Temp 36.5 °C (97.7 °F) (Temporal)   Resp 16   Ht 1.88 m (6' 2\")   Wt 122 kg (270 lb)   SpO2 96%   BMI 34.67 kg/m²     Physical Exam    Assessment/Plan   Problem List Items Addressed This Visit       Unilateral primary osteoarthritis, right knee    Relevant Medications    gabapentin (Neurontin) 300 mg capsule    Typical atrial flutter (Multi)     Other Visit Diagnoses       Encounter for colorectal cancer screening    -  Primary    Relevant Orders    Colonoscopy " Screening; Average Risk Patient    Encounter for immunization        Relevant Orders    Tdap vaccine, age 7 years and older  (BOOSTRIX) (Completed)    Flu vaccine, trivalent, preservative free, age 6 months and greater (Fluarix/Fluzone/Flulaval) (Completed)          Reviewed Social Determinants of health with patient, discussed healthy lifestyle including 150 minutes of physical activity per week  Ordered/Reviewed baseline labwork -CBC, CMP, Lipid Panel  Immunizations:  Up-to-date deferred COVID, given flu shot today  Colonoscopy 2022 due for repeat    Discussed GLP-1 for weight loss we will consider this in 1 month after he is back to increasing activity following his surgery.    Continue on Eliquis, hold all NSAIDs during this time.  Complete Eliquis after 6 months and can consider restarting NSAIDs at that point.    We will increase gabapentin in the interim    Continue follow-up with psychiatry

## 2025-02-25 DIAGNOSIS — Z96.651 S/P TKR (TOTAL KNEE REPLACEMENT) USING CEMENT, RIGHT: Primary | ICD-10-CM

## 2025-02-25 RX ORDER — OXYCODONE HYDROCHLORIDE 5 MG/1
5 TABLET ORAL EVERY 6 HOURS PRN
Qty: 28 TABLET | Refills: 0 | Status: SHIPPED | OUTPATIENT
Start: 2025-02-25 | End: 2025-03-04

## 2025-02-26 ENCOUNTER — TELEPHONE (OUTPATIENT)
Dept: GASTROENTEROLOGY | Facility: CLINIC | Age: 65
End: 2025-02-26
Payer: COMMERCIAL

## 2025-02-26 NOTE — TELEPHONE ENCOUNTER
Attempted to speak with patient in regards to rescheduling procedure for August 2025 due to pulmonary embolism on 02/01/2025. Unable to leave voicemail at this time due to mailbox not set up.

## 2025-02-27 DIAGNOSIS — Z12.5 PROSTATE CANCER SCREENING: Primary | ICD-10-CM

## 2025-03-02 LAB
ATRIAL RATE: 110 BPM
ATRIAL RATE: 97 BPM
P AXIS: 56 DEGREES
P AXIS: 67 DEGREES
P OFFSET: 188 MS
P OFFSET: 193 MS
P ONSET: 135 MS
P ONSET: 137 MS
PR INTERVAL: 164 MS
PR INTERVAL: 170 MS
Q ONSET: 219 MS
Q ONSET: 220 MS
QRS COUNT: 16 BEATS
QRS COUNT: 18 BEATS
QRS DURATION: 80 MS
QRS DURATION: 82 MS
QT INTERVAL: 346 MS
QT INTERVAL: 364 MS
QTC CALCULATION(BAZETT): 462 MS
QTC CALCULATION(BAZETT): 468 MS
QTC FREDERICIA: 423 MS
QTC FREDERICIA: 427 MS
R AXIS: 33 DEGREES
R AXIS: 34 DEGREES
T AXIS: 25 DEGREES
T AXIS: 25 DEGREES
T OFFSET: 393 MS
T OFFSET: 401 MS
VENTRICULAR RATE: 110 BPM
VENTRICULAR RATE: 97 BPM

## 2025-03-04 DIAGNOSIS — I26.99 OTHER ACUTE PULMONARY EMBOLISM, UNSPECIFIED WHETHER ACUTE COR PULMONALE PRESENT (MULTI): ICD-10-CM

## 2025-03-04 LAB — PSA SERPL-MCNC: 0.27 NG/ML

## 2025-03-04 RX ORDER — APIXABAN 5 MG/1
5 TABLET, FILM COATED ORAL 2 TIMES DAILY
Qty: 60 TABLET | Refills: 0 | Status: SHIPPED | OUTPATIENT
Start: 2025-03-04 | End: 2025-03-04 | Stop reason: SDUPTHER

## 2025-03-06 ENCOUNTER — HOSPITAL ENCOUNTER (OUTPATIENT)
Dept: RADIOLOGY | Facility: CLINIC | Age: 65
Discharge: HOME | End: 2025-03-06
Payer: COMMERCIAL

## 2025-03-06 ENCOUNTER — OFFICE VISIT (OUTPATIENT)
Dept: ORTHOPEDIC SURGERY | Facility: CLINIC | Age: 65
End: 2025-03-06
Payer: COMMERCIAL

## 2025-03-06 DIAGNOSIS — Z96.651 S/P TOTAL KNEE ARTHROPLASTY, RIGHT: ICD-10-CM

## 2025-03-06 PROCEDURE — 99211 OFF/OP EST MAY X REQ PHY/QHP: CPT | Performed by: PHYSICIAN ASSISTANT

## 2025-03-06 PROCEDURE — 1036F TOBACCO NON-USER: CPT | Performed by: PHYSICIAN ASSISTANT

## 2025-03-06 PROCEDURE — 73562 X-RAY EXAM OF KNEE 3: CPT | Mod: RT

## 2025-03-06 RX ORDER — TESTOSTERONE 20.25 MG/1.25G
GEL TOPICAL
COMMUNITY
Start: 2025-03-01

## 2025-03-06 NOTE — PROGRESS NOTES
JEFF Villareal, BERNY, ATC  Adult Reconstruction and Joint Replacement Surgery  Phone: [phone]     Fax:[phone]            Chief Complaint   Patient presents with    Right Knee - Post-op     POV Right TKA SR25       HPI:  Abhishek Olson is a pleasant 64 y.o. year-old male here for follow-up of their side: right total knee arthroplasty by Dr. Dickey.  The patient is approximately 6 week(s) postop.The patient has no mechanical symptoms.  The patient has moderate swelling and pain.   The patients wound has healed uneventfully.  His recovery was complicated by a lower extremity DVT that migrated to a PE.  He is now on Eliquis for the next 6 months followed by primary care.    Review of Systems  Past Medical History:   Diagnosis Date    Anxiety     Arrhythmia     hx of afib s/p ablation    Depression     Essential (primary) hypertension 2022    Hypertension    GERD (gastroesophageal reflux disease)     History of blood transfusion         Mixed hyperlipidemia     Elevated cholesterol with high triglycerides    Other specified postprocedural states 2016    History of colonoscopy    Personal history of other diseases of urinary system     History of kidney problems    Post concussive syndrome     Sleep apnea      Patient Active Problem List   Diagnosis    Hypertension, essential    Low testosterone in male    Concussion with no loss of consciousness    Anxiety    Unilateral primary osteoarthritis, right knee    S/P total knee arthroplasty, right    GABRIELA (obstructive sleep apnea)    Other acute pulmonary embolism, unspecified whether acute cor pulmonale present (Multi)    Typical atrial flutter (Multi)     Medication Documentation Review Audit       Reviewed by Tanja Parry PA-C (Physician Assistant) on 25 at 1107      Medication Order Taking? Sig Documenting Provider Last Dose Status     Discontinued 25 1520     Discontinued 25 1542     Discontinued  03/04/25 1622   apixaban (Eliquis) 5 mg tablet [id number] Yes Take 1 tablet (5 mg) by mouth 2 times a day. Dakotah Frey DO  Active   escitalopram (Lexapro) 20 mg tablet [id number] Yes Take 1 tablet (20 mg) by mouth once daily. Cedrick Hernandez MD  Active   gabapentin (Neurontin) 300 mg capsule [id number] Yes Take 1 capsule (300 mg) by mouth 3 times a day. Dakotah Frey DO  Active   lisinopriL-hydrochlorothiazide 20-25 mg tablet [id number] Yes Take 1 tablet by mouth once daily. Cedrick Hernandez MD  Active     Discontinued 03/06/25 1039     Discontinued 03/06/25 1039   polyethylene glycol (Glycolax, Miralax) 17 gram/dose powder [id number] Yes Mix 1 cap (17g) into 8 ounces of fluid. Mirella Braxton MD  Active   rosuvastatin (Crestor) 20 mg tablet [id number] Yes TAKE ONE TABLET BY MOUTH DAILY Cedrick Hernandez MD  Active   testosterone 20.25 mg/1.25 gram (1.62 %) gel in metered-dose pump [id number] Yes APPLY 4 PUMPS TO UPPER ARM DAILY FOR LOW TESTOSTERONE. Historical Provider, MD  Active   venlafaxine XR (Effexor-XR) 75 mg 24 hr capsule [id number] Yes Take 1 capsule (75 mg) by mouth once daily. Historical Provider, MD  Active                  Allergies   Allergen Reactions    Penicillins Rash     Social History     Socioeconomic History    Marital status:      Spouse name: Not on file    Number of children: Not on file    Years of education: Not on file    Highest education level: Not on file   Occupational History    Not on file   Tobacco Use    Smoking status: Never    Smokeless tobacco: Never   Vaping Use    Vaping status: Never Used   Substance and Sexual Activity    Alcohol use: Not Currently    Drug use: Yes     Types: Marijuana    Sexual activity: Not on file   Other Topics Concern    Not on file   Social History Narrative    Not on file     Social Drivers of Health     Financial Resource Strain: Low Risk  (2/3/2025)    Overall Financial Resource Strain (CARDIA)     Difficulty of Paying Living Expenses: Not very hard    Food Insecurity: No Food Insecurity (2/2/2025)    Hunger Vital Sign     Worried About Running Out of Food in the Last Year: Never true     Ran Out of Food in the Last Year: Never true   Transportation Needs: No Transportation Needs (2/3/2025)    PRAPARE - Transportation     Lack of Transportation (Medical): No     Lack of Transportation (Non-Medical): No   Physical Activity: Sufficiently Active (2/24/2025)    Exercise Vital Sign     Days of Exercise per Week: 5 days     Minutes of Exercise per Session: 40 min   Stress: No Stress Concern Present (2/24/2025)    [employer] - Occupational Stress Questionnaire     Feeling of Stress : Not at all   Social Connections: Feeling Socially Integrated (1/31/2025)    OASIS : Social Isolation     Frequency of experiencing loneliness or isolation: Never   Intimate Partner Violence: Not At Risk (2/2/2025)    Humiliation, Afraid, Rape, and Kick questionnaire     Fear of Current or Ex-Partner: No     Emotionally Abused: No     Physically Abused: No     Sexually Abused: No   Housing Stability: Low Risk  (2/3/2025)    Housing Stability Vital Sign     Unable to Pay for Housing in the Last Year: No     Number of Times Moved in the Last Year: 1     Homeless in the Last Year: No     Past Surgical History:   Procedure Laterality Date    ABLATION OF DYSRHYTHMIC FOCUS      COLONOSCOPY      HIP ARTHROPLASTY      OTHER SURGICAL HISTORY  05/03/2019    Hernia repair    OTHER SURGICAL HISTORY Left 05/03/2019    Orbital blowout fracture repair    OTHER SURGICAL HISTORY Bilateral 05/03/2019    Hip surgery    SHOULDER SURGERY Bilateral 03/31/2015    Shoulder Surgery       Physical Exam  side: right Knee  There were no vitals filed for this visit.  AxO x 3 in NAD.   Assistive Device: cane. Coordination and balance intact.  Normal bilateral upper and lower extremities.  No erythema, ecchymosis, temperature changes. No popliteal lymphadenopathy,  No overlying  lesion  Mood: euthymic  Respirations non-labored  The incision is midline healing well with no signs of surrounding infection, dehiscence or drainage.   Neurovascular exam is at baseline.  No instability varus or valgus stressing the knee at 0, 30 or 60 degrees.  No instability in the AP plane at 90 degrees.  Range of motion: 5 degrees extension, 100 degrees flexion  5/5 hip flexion/knee extension/DF/PF/EHL  SILT in deandre/saph/ per/tib distribution   Extremities warm and well perfused.  No lower extremity calf tenderness, warmth or swelling. Lower extremity well perfused  2+ Femoral/DP/PT pulses bilaterally    Imaging:  No images are attached to the encounter.    I personally reviewed multiple views of the knee today in clinic. Status post side: right Total Knee aArthroplasty. The implant is well fixed, well aligned.  No evidence of traci-implant fracture, lucency or dislocation.    Impression/Plan:  Abhishek Olson is doing well post-operatively and happy with the results of the operation.     S/P side: right Total Knee Arthroplasty  I talked with patient at length about activity precautions and progression of activities. The patient understands their permanent precautions. At this time, you may gradually increase your activities and get back to a normal, low-impact lifestyle. Please avoid running, jumping, and heavy lifting.      We discussed that he is doing well given his setbacks.  We will keep an eye on his range of motion.  I would like to see him back in 6 months.  I will also extend his pain medicine for at least another month given his setbacks.    3. Continue HEP or outpatient PT, per protocol.    4. Continue Post-operative instructions.    5. Discussed the importance of dental prophylactic dental antibiotics lifelong. Patient may request medication refill through MyChart,       Pharmacy or surgeons office.    All questions answered.    Follow-up 6 months with x-rays at next visit.    Tanja Parry MPAS,  BERNY, ATC  Orthopedic Physician Assisant  Adult Reconstruction and Total Joint Replacement  General Orthopedics  Department of Orthopaedic Surgery  [place] [id number]  [email or web address] messaging preferred

## 2025-03-17 DIAGNOSIS — Z96.651 S/P TKR (TOTAL KNEE REPLACEMENT) USING CEMENT, RIGHT: Primary | ICD-10-CM

## 2025-03-17 RX ORDER — OXYCODONE HYDROCHLORIDE 5 MG/1
5 TABLET ORAL EVERY 6 HOURS PRN
Qty: 28 TABLET | Refills: 0 | Status: SHIPPED | OUTPATIENT
Start: 2025-03-17 | End: 2025-03-24

## 2025-03-24 ENCOUNTER — APPOINTMENT (OUTPATIENT)
Dept: PRIMARY CARE | Facility: CLINIC | Age: 65
End: 2025-03-24
Payer: COMMERCIAL

## 2025-03-24 VITALS — BODY MASS INDEX: 34.67 KG/M2 | WEIGHT: 270 LBS

## 2025-03-24 DIAGNOSIS — E66.811 CLASS 1 OBESITY DUE TO EXCESS CALORIES WITH SERIOUS COMORBIDITY AND BODY MASS INDEX (BMI) OF 34.0 TO 34.9 IN ADULT: Primary | ICD-10-CM

## 2025-03-24 DIAGNOSIS — E66.09 CLASS 1 OBESITY DUE TO EXCESS CALORIES WITH SERIOUS COMORBIDITY AND BODY MASS INDEX (BMI) OF 34.0 TO 34.9 IN ADULT: Primary | ICD-10-CM

## 2025-03-24 PROCEDURE — 99214 OFFICE O/P EST MOD 30 MIN: CPT | Performed by: STUDENT IN AN ORGANIZED HEALTH CARE EDUCATION/TRAINING PROGRAM

## 2025-03-24 RX ORDER — SEMAGLUTIDE 0.25 MG/.5ML
0.25 INJECTION, SOLUTION SUBCUTANEOUS WEEKLY
Qty: 2 ML | Refills: 0 | Status: SHIPPED | OUTPATIENT
Start: 2025-03-24 | End: 2025-04-15

## 2025-03-24 ASSESSMENT — ENCOUNTER SYMPTOMS
FEVER: 0
DIZZINESS: 0
NAUSEA: 0
VOMITING: 0
LIGHT-HEADEDNESS: 0
SHORTNESS OF BREATH: 0

## 2025-03-25 NOTE — PROGRESS NOTES
"Ibrahima Olson \"Nir" is a 64 y.o. male who presents for Follow-up (Pt to do virtual visit for medication).  Patient seen today over virtual appointment for weight concern.    Patient BMI is 34, is interested in starting GLP-1 medication, we did discuss this at start of last appointment but patient was just recovering from a pulmonary embolism,, is increasing weight.  He does watch what he eats tries eat a healthy diet.    Is interested in starting this medication to help with potentially low back pain as well.  Patient has tried several interventions without significant relief.    He did do a trial of a compounded tirzepatide which had a mild effect.            Review of Systems   Constitutional:  Negative for fever.   Respiratory:  Negative for shortness of breath.    Cardiovascular:  Negative for chest pain.   Gastrointestinal:  Negative for nausea and vomiting.   Neurological:  Negative for dizziness and light-headedness.   All other systems reviewed and are negative.      Objective   Physical Exam  Constitutional:       Comments: Seen via virtual exam         Assessment/Plan   Problem List Items Addressed This Visit    None  Visit Diagnoses       Class 1 obesity due to excess calories with serious comorbidity and body mass index (BMI) of 34.0 to 34.9 in adult    -  Primary    Relevant Medications    semaglutide, weight loss, (Wegovy) 0.25 mg/0.5 mL pen injector    Other Relevant Orders    Referral to Clinical Pharmacy          Patient seen today for evaluation for semaglutide    Overall patient is improving well from previous.    We will order semaglutide through his pharmacy, put in a referral to our clinical pharmacist to see if they can help get medication covered if necessary.    Follow-up as new concerns arise.     "

## 2025-03-31 ENCOUNTER — APPOINTMENT (OUTPATIENT)
Dept: PHARMACY | Facility: HOSPITAL | Age: 65
End: 2025-03-31
Payer: COMMERCIAL

## 2025-04-07 ENCOUNTER — APPOINTMENT (OUTPATIENT)
Dept: GASTROENTEROLOGY | Facility: EXTERNAL LOCATION | Age: 65
End: 2025-04-07
Payer: COMMERCIAL

## 2025-04-14 ENCOUNTER — PROCEDURE VISIT (OUTPATIENT)
Dept: NEUROLOGY | Facility: CLINIC | Age: 65
End: 2025-04-14
Payer: COMMERCIAL

## 2025-04-14 DIAGNOSIS — G43.709 CHRONIC MIGRAINE W/O AURA W/O STATUS MIGRAINOSUS, NOT INTRACTABLE: Primary | ICD-10-CM

## 2025-04-14 PROCEDURE — 64615 CHEMODENERV MUSC MIGRAINE: CPT | Performed by: STUDENT IN AN ORGANIZED HEALTH CARE EDUCATION/TRAINING PROGRAM

## 2025-04-14 PROCEDURE — 2500000004 HC RX 250 GENERAL PHARMACY W/ HCPCS (ALT 636 FOR OP/ED): Mod: JZ | Performed by: STUDENT IN AN ORGANIZED HEALTH CARE EDUCATION/TRAINING PROGRAM

## 2025-04-14 RX ORDER — VENLAFAXINE HYDROCHLORIDE 37.5 MG/1
CAPSULE, EXTENDED RELEASE ORAL
COMMUNITY
Start: 2025-03-26

## 2025-04-14 RX ADMIN — ONABOTULINUMTOXINA 195 UNITS: 100 INJECTION, POWDER, LYOPHILIZED, FOR SOLUTION INTRADERMAL; INTRAMUSCULAR at 09:04

## 2025-04-14 NOTE — PROGRESS NOTES
Subjective   Headaches down in severity by 30%. This is the best headaches have been since onset.     Was recently hospitalized after knee surgery with DVT and pneumonia.     Botox    Date/Time: 4/14/2025 8:54 AM    Performed by: Jens Boone DO  Authorized by: Jens Boone DO    Procedure specific details:      Procedure Note: PREEMPT Botox    Indications: Chronic Migraine    Informed consent was obtained (explaining the procedure and risks and benefits of procedure) from patient: the signed consent form was placed in the medical record.  A time out was completed, verifying correct patient, procedure,site, positioning, and implants or special equipment.    200 units of OnabotulinumtoxinA were reconstituted with saline. Sites of injection were identified via PREEMPT protocol and cleaned with alcohol pads. Using a 30 gauge needle, patient received following injections:    5 units in procerus  5 units in each left and right   10 units divided between 2 sites of L frontalis  10 units divided between 2 sites of R frontalis  20 units divided between 4 sites of L temporalis  20 units divided between 4 sites of R temporalis  15 units divided between 3 sites of L occipitalis  15 units divided between 3 sites of R occipitalis  10 units divided between 2 sites of L paracervical muscles  10 units divided between 2 sites of R paracervical muscles  15 units divided between 3 sites of L trapezius  15 units divided between 3 sites of R trapezius    Additional Sites:  10u L temporalis  10u R temporalis  10u L occipitalis  10u R occipitalis    Used: 195u  Wasted: 5u    There were no complications. Patient was comfortable and left without complaint.     OnabotulinumtoxinA  Lot #: [id number]  Exp: 11/2026

## 2025-04-15 ENCOUNTER — APPOINTMENT (OUTPATIENT)
Dept: PHARMACY | Facility: HOSPITAL | Age: 65
End: 2025-04-15
Payer: COMMERCIAL

## 2025-04-15 DIAGNOSIS — E66.09 CLASS 1 OBESITY DUE TO EXCESS CALORIES WITH SERIOUS COMORBIDITY AND BODY MASS INDEX (BMI) OF 34.0 TO 34.9 IN ADULT: ICD-10-CM

## 2025-04-15 DIAGNOSIS — E66.811 CLASS 1 OBESITY DUE TO EXCESS CALORIES WITH SERIOUS COMORBIDITY AND BODY MASS INDEX (BMI) OF 34.0 TO 34.9 IN ADULT: ICD-10-CM

## 2025-04-15 NOTE — PATIENT INSTRUCTIONS
Eating tips to reduce nausea and unwanted GI side effects from Ozempic and Mounjaro   Eat slowly  Eat smaller portions  Avoid laying down right after meal  Stop eating when feeling full  Avoid drinking from straw  Stay hydrated, drink small amounts of water throughout the day  Avoid strenuous exercise after eating  Avoid eating close too bedtime    Meal planning tips  Avoid fried foods, and high fat meals  Focus on bland foods  Choose water rich foods like soups, kefir, protein drinks/smoothies     Lifestyle suggestions  Keep a food/symptom diary, as it may be helpful to identify meal timing of foods that make nausea worse.  Engage in light exercise (walking), get fresh air    Additional tips for alleviating nausea:  Wait at least 30 minutes after GLP1-RA dose to eat, if feeling nauseated try crackers, apples, mint, simón root or simón tea  Avoid strong smells    If vomiting:  Eat smaller amounts of food in more frequent meals  Stay hydrated, but avoid drinks during meals, wait 30-60 minutes before and after meals to have liquids    If having diarrhea:  Generous hydration, i.e: water with lemon and tsp baking soda  Avoid dairy products, laxatives, coffee, alcoholic beverages, soft drinks, very cold or very hot foods, products that contain sugar alcohols (sorbitol, mannitol, xylitol, maltitol), including gum and candy  Avoid (or temporarily reduce your intake of) foods with high fiber content such as grain and seed products, whole grain breads, artichokes, asparagus, beans, cabbage, lentils, mushrooms, onions, garlic, sugar snap peas, skinned fruits, apples, apricots, blackberries, cherries, steph, nectarine, pears, plum  Eat chicken broth, rice, carrots, very ripe fruit without skin  Gradually increase fiber back in when symptoms improve    If having constipation:  Ensure the amount of fiber in diet is adequate (goal is minimum of 25 grams per day).  Increase physical activity  Drink generous amounts of water

## 2025-04-15 NOTE — PROGRESS NOTES
"Patient ID: Abhishek Olson \"Jaron\" is a 64 y.o. male who presents for initial visit.    Patient was referred to clinical pharmacy for weight management with a plan to initiate Wegovy. PA was approved and Jaron picked up his first fill, however his new insurance took effect this month, increasing his copay to $1500.    Referring provider: Dakotah Frey DO   Subjective     Weight-loss Assessment    Diet: 2 meals per day   Lunch: protein shake   Dinner: home-cooked meal; protein incorporated   Snacks: later; trail mix   Drinks: mostly water - diet coke    Exercise: Discussed ADA recommendation of 150 minutes per week of moderate-intensity exercise     Baseline: 270 lbs    Allergies   Allergen Reactions    Penicillins Rash     Current weight-loss pharmacotherapy:   N/A    Pertinent PMH review:  PMH of Pancreatitis: No  PMH/FH of Medullary Thyroid Cancer: No  PMH/FH of Multiple Endocrine Neoplasia (MEN) Type II: No  PMH of Retinopathy: No    Drug Interactions:  No significant drug-drug interactions exist that require adjustment to therapy      Medication Review  Current Outpatient Medications   Medication Instructions    apixaban (ELIQUIS) 5 mg, oral, 2 times daily    escitalopram (LEXAPRO) 20 mg, oral, Daily    gabapentin (NEURONTIN) 300 mg, oral, 3 times daily    lisinopriL-hydrochlorothiazide 20-25 mg tablet 1 tablet, oral, Daily    rosuvastatin (CRESTOR) 20 mg, oral, Daily    venlafaxine XR (Effexor-XR) 37.5 mg 24 hr capsule TAKE ONE CAPSULE (37.5 MG) BY MOUTH EVERY MORNING WITH ONE 75 MG CAPSULE FOR A TOTAL DAILY DOSE .5 MG    venlafaxine XR (EFFEXOR-XR) 75 mg, Daily    Wegovy 0.25 mg, subcutaneous, Weekly      No issues reported in regards to accessibility, affordability, adherence, adverse effects, or organization    Objective   Lab Review  Lab Results   Component Value Date    HGBA1C 4.8 01/03/2025      Lab Results   Component Value Date    EGFR >90 02/13/2025    EGFR >90 02/04/2025    EGFR >90 02/03/2025 " "    No components found for: \"UACR\"  Lab Results   Component Value Date    TRIG 103 02/01/2025    CHOL 164 02/01/2025    LDLCALC 99 02/01/2025    HDL 44.2 02/01/2025     Lab Results   Component Value Date    BILITOT 0.8 02/13/2025    CALCIUM 9.3 02/13/2025    CO2 26 02/13/2025     02/13/2025    CREATININE 0.83 02/13/2025    GLUCOSE 89 02/13/2025    ALKPHOS 187 (H) 02/13/2025    K 4.1 02/13/2025    PROT 6.9 02/13/2025     02/13/2025    AST 94 (H) 02/13/2025     (H) 02/13/2025    BUN 22 02/13/2025    ANIONGAP 14 02/13/2025    MG 2.02 02/13/2025    PHOS 3.8 02/02/2025     (H) 02/01/2025    ALBUMIN 4.0 02/13/2025    LIPASE 21 02/01/2025     BP Readings from Last 3 Encounters:   02/24/25 (!) 144/97   02/13/25 124/77   02/04/25 120/83     BMI Readings from Last 1 Encounters:   03/24/25 34.67 kg/m²     The 10-year ASCVD risk score (Koby LONG, et al., 2019) is: 13.2%    Values used to calculate the score:      Age: 64 years      Sex: Male      Is Non- : No      Diabetic: No      Tobacco smoker: No      Systolic Blood Pressure: 144 mmHg      Is BP treated: Yes      HDL Cholesterol: 44.2 mg/dL      Total Cholesterol: 164 mg/dL     PAP Eligibility Assessment  This patient has expressed a need for medication cost assistance and will be considered for the  Patient Assistance Program ( PAP). If patient is approved, the below medications would result in a $0 cost/month for the patient. If approved, patient must then provide updated financial documents each calendar year and have a new prescription issued by a  Meds pharmacist to continue receiving medications at $0 cost.    Medications  Wegovy    Eligibility Requirements  [x] Patient has pre-existing prescription coverage  [x] Medication(s) above are listed on  PAP formulary  [] Income: Less than or equal to 400% Federal Poverty Limit  Household Persons: 3  [x] Patient's prescription insurance is contracted with either  " [facility] or  Specialty Pharmacy    2025* Poverty Guidelines for the [facility] States   Persons in Family/Household Poverty Guideline Annual 400% Monthly 400%   1 $15,650 $62,600 $5,216   2 $21,150 $84,600 $7,050   3 $26,650 $106,600 $8,883   4 $32,150 $128,600 $10,716   5 $37,650 $150,600 $12,550   6 $43,150 $172,600 $14,383   7 $48,650 $194,600 $16,216   8 $54,150 $216,600 $18,050   *[facility] updates FPL guidelines in late January of each calendar year.      Initial Eligibility Determination  Note: Clinical Pharmacy is not responsible for finalizing eligibility approval. The  PAP team determines final eligibility.  This patient may qualify for the  PAP program (with long-term documentation)    Financial Documents Required for Application Submission  [x] 1040 Tax Form   [x] Statement of Non-filing  [] Social Security Annual Benefits Letter     PATIENT EDUCATION/GOALS  Counseled patient on Wegovy MOA, expectations, side effects, duration of therapy, administration, and monitoring parameters.  Provided detailed dosing and administration counseling to ensure proper technique.   Reviewed Wegovy titration schedule, starting with 0.25 mg once weekly to a goal of 2.4 mg once weekly if tolerated  Counseled patient on the benefits of GLP-1ra glycemic control and weight loss  Reviewed storage requirements of Wegovy when not in use, and when to administer the medication if a dose is missed.  Advised patient that they may experience improved satiety after meals and portion sizes of meals may be reduced as doses of Wegovy increase.  Discussed a minimal target goal of 5% baseline body weight loss with a final target of 15% or more     Counseled patient on MOA, expectations, side effects, duration of therapy, contraindications, administration, and monitoring parameters  Answered all patient questions and concerns; provided [facility] phone number if issues/questions arise    Assessment/Plan   Problem List Items Addressed This Visit     None  Visit Diagnoses       Class 1 obesity due to excess calories with serious comorbidity and body mass index (BMI) of 34.0 to 34.9 in adult        Relevant Orders    Referral to Clinical Pharmacy        Discussion: Jaron is doing well at this time. 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 Income for last year exceeds threshold, but new income was verbally state to be less. Patient will benefit from the reduction in morbidity and mortality associated with long-term obesity. Patient to provide necessary forms to this pharmacist via email. Discuss administration and adverse effects, patient likely to start Wegovy this week.  Plan:   Initiate:   Wegovy 0.25 mg weekly  Follow-up: 1 weeks; 4/24 @ 11am  Future considerations: PAP determination  Refills: Not needed    Ortega Valverde, PharmD  PGY-1 Pharmacy Resident

## 2025-04-15 NOTE — PROGRESS NOTES
I reviewed the progress note and agree with the resident’s findings and plans as written. Case discussed with resident.    Carrington Parry, PharmD

## 2025-04-22 ENCOUNTER — APPOINTMENT (OUTPATIENT)
Dept: PHARMACY | Facility: HOSPITAL | Age: 65
End: 2025-04-22
Payer: COMMERCIAL

## 2025-04-22 DIAGNOSIS — E66.811 CLASS 1 OBESITY DUE TO EXCESS CALORIES WITH SERIOUS COMORBIDITY AND BODY MASS INDEX (BMI) OF 34.0 TO 34.9 IN ADULT: ICD-10-CM

## 2025-04-22 DIAGNOSIS — E66.09 CLASS 1 OBESITY DUE TO EXCESS CALORIES WITH SERIOUS COMORBIDITY AND BODY MASS INDEX (BMI) OF 34.0 TO 34.9 IN ADULT: ICD-10-CM

## 2025-04-22 NOTE — PROGRESS NOTES
"Patient ID: Abhishek Olson \"Jaron\" is a 64 y.o. male who presents for Follow-up (weight-loss).    Patient was referred to clinical pharmacy for weight management with a plan to initiate Wegovy. PA was approved and Jaron picked up his first fill, however his new insurance took effect this month, increasing his copay to $1500.    Referring provider: Dakotah Frey DO   Subjective     Weight-loss Assessment    Diet: 2 meals per day   Lunch: protein shake   Dinner: home-cooked meal; protein incorporated   Snacks: later; trail mix   Drinks: mostly water - diet coke    Exercise: Discussed ADA recommendation of 150 minutes per week of moderate-intensity exercise     Baseline: 270 lbs  Goal: 210 - 220 lbs    Allergies   Allergen Reactions    Penicillins Rash     Current weight-loss pharmacotherapy:   Wegovy 0.25 mg once weekly    Pertinent PMH review:  PMH of Pancreatitis: No  PMH/FH of Medullary Thyroid Cancer: No  PMH/FH of Multiple Endocrine Neoplasia (MEN) Type II: No  PMH of Retinopathy: No    Drug Interactions:  No significant drug-drug interactions exist that require adjustment to therapy      Medication Review  Current Outpatient Medications   Medication Instructions    apixaban (ELIQUIS) 5 mg, oral, 2 times daily    escitalopram (LEXAPRO) 20 mg, oral, Daily    gabapentin (NEURONTIN) 300 mg, oral, 3 times daily    lisinopriL-hydrochlorothiazide 20-25 mg tablet 1 tablet, oral, Daily    rosuvastatin (CRESTOR) 20 mg, oral, Daily    venlafaxine XR (Effexor-XR) 37.5 mg 24 hr capsule TAKE ONE CAPSULE (37.5 MG) BY MOUTH EVERY MORNING WITH ONE 75 MG CAPSULE FOR A TOTAL DAILY DOSE .5 MG    venlafaxine XR (EFFEXOR-XR) 75 mg, Daily    Wegovy 0.25 mg, subcutaneous, Weekly      No issues reported in regards to accessibility, affordability, adherence, adverse effects, or organization    Objective   Lab Review  Lab Results   Component Value Date    HGBA1C 4.8 01/03/2025      Lab Results   Component Value Date    EGFR >90 " "02/13/2025    EGFR >90 02/04/2025    EGFR >90 02/03/2025     No components found for: \"UACR\"  Lab Results   Component Value Date    TRIG 103 02/01/2025    CHOL 164 02/01/2025    LDLCALC 99 02/01/2025    HDL 44.2 02/01/2025     Lab Results   Component Value Date    BILITOT 0.8 02/13/2025    CALCIUM 9.3 02/13/2025    CO2 26 02/13/2025     02/13/2025    CREATININE 0.83 02/13/2025    GLUCOSE 89 02/13/2025    ALKPHOS 187 (H) 02/13/2025    K 4.1 02/13/2025    PROT 6.9 02/13/2025     02/13/2025    AST 94 (H) 02/13/2025     (H) 02/13/2025    BUN 22 02/13/2025    ANIONGAP 14 02/13/2025    MG 2.02 02/13/2025    PHOS 3.8 02/02/2025     (H) 02/01/2025    ALBUMIN 4.0 02/13/2025    LIPASE 21 02/01/2025     BP Readings from Last 3 Encounters:   02/24/25 (!) 144/97   02/13/25 124/77   02/04/25 120/83     BMI Readings from Last 1 Encounters:   03/24/25 34.67 kg/m²     The 10-year ASCVD risk score (Koby LONG, et al., 2019) is: 13.2%    Values used to calculate the score:      Age: 64 years      Sex: Male      Is Non- : No      Diabetic: No      Tobacco smoker: No      Systolic Blood Pressure: 144 mmHg      Is BP treated: Yes      HDL Cholesterol: 44.2 mg/dL      Total Cholesterol: 164 mg/dL     PAP Eligibility Assessment  This patient has expressed a need for medication cost assistance and will be considered for the  Patient Assistance Program ( PAP). If patient is approved, the below medications would result in a $0 cost/month for the patient. If approved, patient must then provide updated financial documents each calendar year and have a new prescription issued by a  Meds pharmacist to continue receiving medications at $0 cost.    Medications  Wegovy    Eligibility Requirements  [x] Patient has pre-existing prescription coverage  [x] Medication(s) above are listed on  PAP formulary  [] Income: Less than or equal to 400% Federal Poverty Limit  Household Persons: 3  [x] " Patient's prescription insurance is [facility] with either  [facility] or  Specialty Pharmacy    2025* Poverty Guidelines for the [place] [facility] States   Persons in Family/Household Poverty Guideline Annual 400% Monthly 400%   1 $15,650 $62,600 $5,216   2 $21,150 $84,600 $7,050   3 $26,650 $106,600 $8,883   4 $32,150 $128,600 $10,716   5 $37,650 $150,600 $12,550   6 $43,150 $172,600 $14,383   7 $48,650 $194,600 $16,216   8 $54,150 $216,600 $18,050   *[facility] updates FPL guidelines in late January of each calendar year.      Initial Eligibility Determination  Note: Clinical Pharmacy is not responsible for finalizing eligibility approval. The  PAP team determines final eligibility.  This patient may qualify for the  PAP program (with [facility] documentation)    Financial Documents Required for Application Submission  [x] 1040 Tax Form   [x] Statement of Non-filing  [] Social Security Annual Benefits Letter     PATIENT EDUCATION/GOALS  Counseled patient on Wegovy MOA, expectations, side effects, duration of therapy, administration, and monitoring parameters.  Provided detailed dosing and administration counseling to ensure proper technique.   Reviewed Wegovy titration schedule, starting with 0.25 mg once weekly to a goal of 2.4 mg once weekly if tolerated  Counseled patient on the benefits of GLP-1ra glycemic control and weight loss  Reviewed storage requirements of Wegovy when not in use, and when to administer the medication if a dose is missed.  Advised patient that they may experience improved satiety after meals and portion sizes of meals may be reduced as doses of Wegovy increase.  Discussed a minimal target goal of 5% baseline body weight loss with a final target of 15% or more     Counseled patient on MOA, expectations, side effects, duration of therapy, contraindications, administration, and monitoring parameters  Answered all patient questions and concerns; provided [facility] phone number if issues/questions  arise    Assessment/Plan   Problem List Items Addressed This Visit    None  Visit Diagnoses         Class 1 obesity due to excess calories with serious comorbidity and body mass index (BMI) of 34.0 to 34.9 in adult        Relevant Orders    Referral to Clinical Pharmacy        Discussion: Jaron is doing well at this time. He was able to  Wegovy 0.25 mg from his local pharmacy for $25, however he changed insurances and is now responsible for 100% of the drug cost. He gave his first injection last week - denies nausea, vomiting, constipation or diarrhea. Proceeding with patient assistance - patient provided senior care letter to this pharmacist - awaiting 1040. Patient will benefit from the reduction in morbidity and mortality associated with long-term obesity.  Plan:   Continue:   Wegovy 0.25 mg weekly  Follow-up: 2 weeks; 5/6 @ 11am  Future considerations: PAP determination vs insurance cost vs discontinuation  Refills: Not needed    Ortega Valverde, PharmD  PGY-1 Pharmacy Resident

## 2025-04-30 ENCOUNTER — TELEPHONE (OUTPATIENT)
Dept: PHARMACY | Facility: HOSPITAL | Age: 65
End: 2025-04-30
Payer: COMMERCIAL

## 2025-04-30 DIAGNOSIS — E66.09 CLASS 1 OBESITY DUE TO EXCESS CALORIES WITH SERIOUS COMORBIDITY AND BODY MASS INDEX (BMI) OF 34.0 TO 34.9 IN ADULT: Primary | ICD-10-CM

## 2025-04-30 DIAGNOSIS — E66.811 CLASS 1 OBESITY DUE TO EXCESS CALORIES WITH SERIOUS COMORBIDITY AND BODY MASS INDEX (BMI) OF 34.0 TO 34.9 IN ADULT: Primary | ICD-10-CM

## 2025-04-30 RX ORDER — SEMAGLUTIDE 0.5 MG/.5ML
0.5 INJECTION, SOLUTION SUBCUTANEOUS WEEKLY
Qty: 2 ML | Refills: 0 | Status: SHIPPED | OUTPATIENT
Start: 2025-04-30 | End: 2025-05-22

## 2025-04-30 NOTE — TELEPHONE ENCOUNTER
Re-order of Wegovy 0.5 mg once weekly to local [facility] and patient insurance coverage improved.    Problem List Items Addressed This Visit    None  Visit Diagnoses         Class 1 obesity due to excess calories with serious comorbidity and body mass index (BMI) of 34.0 to 34.9 in adult    -  Primary    Relevant Medications    semaglutide, weight loss, (Wegovy) 0.5 mg/0.5 mL pen injector

## 2025-05-06 ENCOUNTER — APPOINTMENT (OUTPATIENT)
Dept: PHARMACY | Facility: HOSPITAL | Age: 65
End: 2025-05-06
Payer: COMMERCIAL

## 2025-05-06 DIAGNOSIS — E66.811 CLASS 1 OBESITY DUE TO EXCESS CALORIES WITH SERIOUS COMORBIDITY AND BODY MASS INDEX (BMI) OF 34.0 TO 34.9 IN ADULT: ICD-10-CM

## 2025-05-06 DIAGNOSIS — E66.09 CLASS 1 OBESITY DUE TO EXCESS CALORIES WITH SERIOUS COMORBIDITY AND BODY MASS INDEX (BMI) OF 34.0 TO 34.9 IN ADULT: ICD-10-CM

## 2025-05-06 NOTE — PROGRESS NOTES
"Patient ID: Abhishek Olson \"Nir" is a 64 y.o. male who presents for Weight Loss management. This is a follow-up visit.    At last pharmacy appointment, patient was enrolled in patient assistance and initiated on Wegovy 0.25 mg.    Referring provider: Dakotah Frey DO     Subjective     Weight-loss Assessment    Diet: 2 meals per day   Lunch: protein shake   Dinner: home-cooked meal; protein incorporated   Snacks: later; trail mix   Drinks: mostly water - diet coke    Exercise: Discussed ADA recommendation of 150 minutes per week of moderate-intensity exercise.    Current weight: 255 lbs  Baseline: 265.5 lbs  Goal: 210 - 220 lbs    Allergies   Allergen Reactions    Penicillins Rash     Current weight-loss pharmacotherapy:   Wegovy 0.25 mg once weekly    Pertinent PMH review:  PMH of Pancreatitis: No  PMH/FH of Medullary Thyroid Cancer: No  PMH/FH of Multiple Endocrine Neoplasia (MEN) Type II: No  PMH of Retinopathy: No    Drug Interactions:  No significant drug-drug interactions exist that require adjustment to therapy      Medication Review  Current Outpatient Medications   Medication Instructions    apixaban (ELIQUIS) 5 mg, oral, 2 times daily    escitalopram (LEXAPRO) 20 mg, oral, Daily    gabapentin (NEURONTIN) 300 mg, oral, 3 times daily    lisinopriL-hydrochlorothiazide 20-25 mg tablet 1 tablet, oral, Daily    rosuvastatin (CRESTOR) 20 mg, oral, Daily    venlafaxine XR (Effexor-XR) 37.5 mg 24 hr capsule TAKE ONE CAPSULE (37.5 MG) BY MOUTH EVERY MORNING WITH ONE 75 MG CAPSULE FOR A TOTAL DAILY DOSE .5 MG    venlafaxine XR (EFFEXOR-XR) 75 mg, Daily    Wegovy 0.5 mg, subcutaneous, Weekly    Wegovy 0.5 mg, subcutaneous, Weekly      No issues reported in regards to accessibility, affordability, adherence, adverse effects, or organization    Objective   Lab Review  Lab Results   Component Value Date    HGBA1C 4.8 01/03/2025      Lab Results   Component Value Date    EGFR >90 02/13/2025    EGFR >90 02/04/2025 " "   EGFR >90 02/03/2025     No components found for: \"UACR\"  Lab Results   Component Value Date    TRIG 103 02/01/2025    CHOL 164 02/01/2025    LDLCALC 99 02/01/2025    HDL 44.2 02/01/2025     Lab Results   Component Value Date    BILITOT 0.8 02/13/2025    CALCIUM 9.3 02/13/2025    CO2 26 02/13/2025     02/13/2025    CREATININE 0.83 02/13/2025    GLUCOSE 89 02/13/2025    ALKPHOS 187 (H) 02/13/2025    K 4.1 02/13/2025    PROT 6.9 02/13/2025     02/13/2025    AST 94 (H) 02/13/2025     (H) 02/13/2025    BUN 22 02/13/2025    ANIONGAP 14 02/13/2025    MG 2.02 02/13/2025    PHOS 3.8 02/02/2025     (H) 02/01/2025    ALBUMIN 4.0 02/13/2025    LIPASE 21 02/01/2025     BP Readings from Last 3 Encounters:   02/24/25 (!) 144/97   02/13/25 124/77   02/04/25 120/83     BMI Readings from Last 1 Encounters:   03/24/25 34.67 kg/m²     The 10-year ASCVD risk score (Koby LONG, et al., 2019) is: 13.2%    Values used to calculate the score:      Age: 64 years      Sex: Male      Is Non- : No      Diabetic: No      Tobacco smoker: No      Systolic Blood Pressure: 144 mmHg      Is BP treated: Yes      HDL Cholesterol: 44.2 mg/dL      Total Cholesterol: 164 mg/dL    PATIENT EDUCATION/GOALS  Counseled patient on Wegovy MOA, expectations, side effects, duration of therapy, administration, and monitoring parameters.  Provided detailed dosing and administration counseling to ensure proper technique.   Reviewed Wegovy titration schedule, starting with 0.25 mg once weekly to a goal of 2.4 mg once weekly if tolerated  Counseled patient on the benefits of GLP-1ra glycemic control and weight loss  Reviewed storage requirements of Wegovy when not in use, and when to administer the medication if a dose is missed.  Advised patient that they may experience improved satiety after meals and portion sizes of meals may be reduced as doses of Wegovy increase.  Discussed a minimal target goal of 5% baseline " body weight loss with a final target of 15% or more     Counseled patient on MOA, expectations, side effects, duration of therapy, contraindications, administration, and monitoring parameters  Answered all patient questions and concerns; provided [facility] phone number if issues/questions arise    Assessment/Plan   Problem List Items Addressed This Visit    None  Visit Diagnoses         Class 1 obesity due to excess calories with serious comorbidity and body mass index (BMI) of 34.0 to 34.9 in adult        Relevant Orders    Referral to Clinical Pharmacy        Discussion: Jaron is doing well at this time. He have his 4th dose of Wegovy 0.25 mg weekly today - denies any adverse effects including nausea, vomiting, constipation, or diarrhea. He has lost around 10 lbs since starting - noticed a decrease in alcohol intake. Insurance coverage is currently unclear (due to switching from his own plan to his wife's) but working in Jaron's favor -> originally completely covered, then responsible for 100% of cash price, now back to completely covered. Will plan to increase to 0.5 mg weekly and will likely continue for 12 weeks total. Follow-up after 4 weeks to assess for adverse effects. Patient in agreement with plan. Patient to reach out to this pharmacist with any questions or concerns.  Plan:   Increase:   Wegovy to 0.5 mg weekly  Follow-up: 3 weeks; 5/27 @ 11am  Future considerations: tolerance vs titration of Wegovy.  Refills: Not needed   PAP: expires 5/5/26    Ortega Valverde, PharmD  PGY-1 Pharmacy Resident

## 2025-05-16 ENCOUNTER — OFFICE VISIT (OUTPATIENT)
Dept: PRIMARY CARE | Facility: CLINIC | Age: 65
End: 2025-05-16
Payer: COMMERCIAL

## 2025-05-16 VITALS
SYSTOLIC BLOOD PRESSURE: 111 MMHG | HEART RATE: 97 BPM | OXYGEN SATURATION: 95 % | RESPIRATION RATE: 16 BRPM | BODY MASS INDEX: 33.64 KG/M2 | WEIGHT: 262 LBS | DIASTOLIC BLOOD PRESSURE: 76 MMHG | TEMPERATURE: 97.9 F

## 2025-05-16 DIAGNOSIS — M13.0 POLYARTHRITIS: ICD-10-CM

## 2025-05-16 DIAGNOSIS — S46.212A BICEPS STRAIN, LEFT, INITIAL ENCOUNTER: ICD-10-CM

## 2025-05-16 DIAGNOSIS — S46.211A BICEPS STRAIN, RIGHT, INITIAL ENCOUNTER: Primary | ICD-10-CM

## 2025-05-16 PROBLEM — L03.90 CELLULITIS: Status: RESOLVED | Noted: 2025-05-16 | Resolved: 2025-05-16

## 2025-05-16 PROBLEM — M54.50 CHRONIC BILATERAL LOW BACK PAIN WITHOUT SCIATICA: Status: ACTIVE | Noted: 2025-05-16

## 2025-05-16 PROBLEM — G89.29 CHRONIC BILATERAL LOW BACK PAIN WITHOUT SCIATICA: Status: ACTIVE | Noted: 2025-05-16

## 2025-05-16 PROBLEM — J40 BRONCHITIS: Status: RESOLVED | Noted: 2025-05-16 | Resolved: 2025-05-16

## 2025-05-16 RX ORDER — DICLOFENAC SODIUM 10 MG/G
4 GEL TOPICAL 4 TIMES DAILY
Qty: 100 G | Refills: 1 | Status: SHIPPED | OUTPATIENT
Start: 2025-05-16

## 2025-05-16 NOTE — PROGRESS NOTES
"Ibrahima Olson \"Jaron\" is a 64 y.o. male who presents for Arthritis (Pt here today to discuss arthritis to both hands and biceps.).  While on recent trip in [place] over this past weekend, both middle fingers and bilateral biceps started to hurt and feel \"arthritis\". Has a curl routine. Last did this routine about 1-1.5 week ago. Bicep pain started last weekend. Bialteral middle finger pain started this past weekend.   Has been told he has arthritis in back. Has low back pain worse with getting up form standing.   Both hips repalced. Right knee repalced.     Arthritis        Review of Systems   Musculoskeletal:  Positive for arthritis.       Objective   Visit Vitals  /76 (BP Location: Right arm, Patient Position: Sitting)   Pulse 97   Temp 36.6 °C (97.9 °F) (Temporal)   Resp 16      Physical Exam  Constitutional:       General: He is not in acute distress.     Appearance: He is not ill-appearing.   HENT:      Mouth/Throat:      Mouth: Mucous membranes are moist.      Pharynx: Oropharynx is clear.   Cardiovascular:      Rate and Rhythm: Normal rate and regular rhythm.      Pulses: Normal pulses.      Heart sounds: No murmur heard.  Pulmonary:      Effort: Pulmonary effort is normal.      Breath sounds: No wheezing, rhonchi or rales.   Musculoskeletal:      Right hand: Tenderness present. Normal strength.      Left hand: Tenderness present. Normal strength.      Comments: Patient with tenderness in the proximal inner phalangeal joints of the third digit on bilateral hands without any rashes with slight bony changes suggestive of arthritis.  No nail pitting.  Strength is intact.  Pulses are normal.    Patient is also having tenderness in bilateral biceps right worse than left without tenderness at the origin or insertion but in the middle of the muscle belly.  No overlying deformities.  Strength is 5 out of 5.   Skin:     General: Skin is warm and dry.      Coloration: Skin is not jaundiced or pale. "   Neurological:      Mental Status: He is alert.   Psychiatric:         Behavior: Behavior normal.         Assessment & Plan  Biceps strain, right, initial encounter  Patient presenting with bilateral biceps strain right worse than left likely after recent biceps workout.  No signs of weakness or current deformity and unlikely to be muscular injury.  Tenderness is right in the middle of the muscle belly suggestive more of muscle strain rather than tendinitis.  Will treat conservatively and use topical diclofenac given that patient is on anticoagulation for blood clot and avoid systemic NSAIDs.  Patient can also use Tylenol for pain.  He can follow-up for this in 4 weeks if no improvement.  Orders:    diclofenac sodium (Voltaren) 1 % gel; Apply 4.5 inches (4 g) topically 4 times a day.    Biceps strain, left, initial encounter    Orders:    diclofenac sodium (Voltaren) 1 % gel; Apply 4.5 inches (4 g) topically 4 times a day.    Polyarthritis  Patient has a history of polyarthritis with bilateral hip replacement, states he is arthritis in the spine, knee replacement, and now having pain in his fingers with multiple sites of possible ar for why he has such significant arthritisthritis in his hands.  For the pain for now we will treat with topical diclofenac given that patient cannot use systemic NSAIDs and treat conservatively.  He did discuss wanting further workup and we will rule out an rheumatologic cause of his arthritis.  Orders:    ELVIN with Reflex to CAROLINE; Future    C-Reactive Protein; Future    Citrulline Antibody, IgG; Future    Rheumatoid Factor; Future    Sedimentation Rate; Future    CBC and Auto Differential; Future             Bakari Peoples DO   05/16/25 4:33 PM

## 2025-05-17 LAB
ANA SER QL IF: NORMAL
BASOPHILS # BLD AUTO: 20 CELLS/UL (ref 0–200)
BASOPHILS NFR BLD AUTO: 0.3 %
CCP IGG SERPL-ACNC: NORMAL
CRP SERPL-MCNC: NORMAL MG/L
EOSINOPHIL # BLD AUTO: 121 CELLS/UL (ref 15–500)
EOSINOPHIL NFR BLD AUTO: 1.8 %
ERYTHROCYTE [DISTWIDTH] IN BLOOD BY AUTOMATED COUNT: 13.1 % (ref 11–15)
ERYTHROCYTE [SEDIMENTATION RATE] IN BLOOD BY WESTERGREN METHOD: 50 MM/H
HCT VFR BLD AUTO: 41.9 % (ref 38.5–50)
HGB BLD-MCNC: 14.2 G/DL (ref 13.2–17.1)
LYMPHOCYTES # BLD AUTO: 1561 CELLS/UL (ref 850–3900)
LYMPHOCYTES NFR BLD AUTO: 23.3 %
MCH RBC QN AUTO: 31.6 PG (ref 27–33)
MCHC RBC AUTO-ENTMCNC: 33.9 G/DL (ref 32–36)
MCV RBC AUTO: 93.3 FL (ref 80–100)
MONOCYTES # BLD AUTO: 657 CELLS/UL (ref 200–950)
MONOCYTES NFR BLD AUTO: 9.8 %
NEUTROPHILS # BLD AUTO: 4342 CELLS/UL (ref 1500–7800)
NEUTROPHILS NFR BLD AUTO: 64.8 %
PLATELET # BLD AUTO: 359 THOUSAND/UL (ref 140–400)
PMV BLD REES-ECKER: 9.9 FL (ref 7.5–12.5)
RBC # BLD AUTO: 4.49 MILLION/UL (ref 4.2–5.8)
RHEUMATOID FACT SERPL-ACNC: NORMAL [IU]/ML
WBC # BLD AUTO: 6.7 THOUSAND/UL (ref 3.8–10.8)

## 2025-05-20 DIAGNOSIS — R79.82 ELEVATED C-REACTIVE PROTEIN: ICD-10-CM

## 2025-05-20 DIAGNOSIS — M13.0 POLYARTHRITIS: Primary | ICD-10-CM

## 2025-05-20 DIAGNOSIS — R70.0 ELEVATED ERYTHROCYTE SEDIMENTATION RATE: ICD-10-CM

## 2025-05-20 LAB
ANA SER QL IF: NEGATIVE
BASOPHILS # BLD AUTO: 20 CELLS/UL (ref 0–200)
BASOPHILS NFR BLD AUTO: 0.3 %
CCP IGG SERPL-ACNC: <16 UNITS
CRP SERPL-MCNC: 13.5 MG/L
EOSINOPHIL # BLD AUTO: 121 CELLS/UL (ref 15–500)
EOSINOPHIL NFR BLD AUTO: 1.8 %
ERYTHROCYTE [DISTWIDTH] IN BLOOD BY AUTOMATED COUNT: 13.1 % (ref 11–15)
ERYTHROCYTE [SEDIMENTATION RATE] IN BLOOD BY WESTERGREN METHOD: 50 MM/H
HCT VFR BLD AUTO: 41.9 % (ref 38.5–50)
HGB BLD-MCNC: 14.2 G/DL (ref 13.2–17.1)
LYMPHOCYTES # BLD AUTO: 1561 CELLS/UL (ref 850–3900)
LYMPHOCYTES NFR BLD AUTO: 23.3 %
MCH RBC QN AUTO: 31.6 PG (ref 27–33)
MCHC RBC AUTO-ENTMCNC: 33.9 G/DL (ref 32–36)
MCV RBC AUTO: 93.3 FL (ref 80–100)
MONOCYTES # BLD AUTO: 657 CELLS/UL (ref 200–950)
MONOCYTES NFR BLD AUTO: 9.8 %
NEUTROPHILS # BLD AUTO: 4342 CELLS/UL (ref 1500–7800)
NEUTROPHILS NFR BLD AUTO: 64.8 %
PLATELET # BLD AUTO: 359 THOUSAND/UL (ref 140–400)
PMV BLD REES-ECKER: 9.9 FL (ref 7.5–12.5)
RBC # BLD AUTO: 4.49 MILLION/UL (ref 4.2–5.8)
RHEUMATOID FACT SERPL-ACNC: <10 IU/ML
WBC # BLD AUTO: 6.7 THOUSAND/UL (ref 3.8–10.8)

## 2025-05-26 DIAGNOSIS — S46.212A BICEPS STRAIN, LEFT, INITIAL ENCOUNTER: ICD-10-CM

## 2025-05-26 DIAGNOSIS — S46.211A BICEPS STRAIN, RIGHT, INITIAL ENCOUNTER: ICD-10-CM

## 2025-05-27 ENCOUNTER — APPOINTMENT (OUTPATIENT)
Dept: PHARMACY | Facility: HOSPITAL | Age: 65
End: 2025-05-27
Payer: COMMERCIAL

## 2025-05-27 DIAGNOSIS — E66.811 CLASS 1 OBESITY DUE TO EXCESS CALORIES WITH SERIOUS COMORBIDITY AND BODY MASS INDEX (BMI) OF 34.0 TO 34.9 IN ADULT: ICD-10-CM

## 2025-05-27 DIAGNOSIS — E66.09 CLASS 1 OBESITY DUE TO EXCESS CALORIES WITH SERIOUS COMORBIDITY AND BODY MASS INDEX (BMI) OF 34.0 TO 34.9 IN ADULT: ICD-10-CM

## 2025-05-27 RX ORDER — DICLOFENAC SODIUM 10 MG/G
GEL TOPICAL
Qty: 100 G | Refills: 0 | Status: SHIPPED | OUTPATIENT
Start: 2025-05-27

## 2025-05-27 RX ORDER — SEMAGLUTIDE 0.5 MG/.5ML
0.5 INJECTION, SOLUTION SUBCUTANEOUS WEEKLY
Qty: 2 ML | Refills: 1 | Status: SHIPPED | OUTPATIENT
Start: 2025-05-27

## 2025-05-27 NOTE — PROGRESS NOTES
"Patient ID: Abhishek Olson \"Nir" is a 64 y.o. male who presents for Weight Loss management. This is a follow-up visit.    At last pharmacy appointment, patient was increased to Wegovy 0.5 mg.    Referring provider: Dakotah Frey DO   Subjective   Weight-loss Assessment    Diet: 2 meals per day   Lunch: protein shake   Dinner: home-cooked meal; protein incorporated   Snacks: later; trail mix   Drinks: mostly water - diet coke    Exercise: Discussed ADA recommendation of 150 minutes per week of moderate-intensity exercise.    Current weight:   5/27/25 252 lbs   5/6/25 255 lbs   4/15/25 265.5 lbs   Goal: 210 - 220 lbs    Allergies   Allergen Reactions    Penicillins Rash     Current weight-loss pharmacotherapy:   Wegovy 0.5 mg once weekly    Pertinent PMH review:  PMH of Pancreatitis: No  PMH/FH of Medullary Thyroid Cancer: No  PMH/FH of Multiple Endocrine Neoplasia (MEN) Type II: No  PMH of Retinopathy: No    Drug Interactions:  No significant drug-drug interactions exist that require adjustment to therapy      Medication Review  Current Outpatient Medications   Medication Instructions    apixaban (ELIQUIS) 5 mg, oral, 2 times daily    diclofenac sodium (VOLTAREN) 4 g, Topical, 4 times daily    escitalopram (LEXAPRO) 20 mg, oral, Daily    gabapentin (NEURONTIN) 300 mg, oral, 3 times daily    lisinopriL-hydrochlorothiazide 20-25 mg tablet 1 tablet, oral, Daily    rosuvastatin (CRESTOR) 20 mg, oral, Daily    venlafaxine XR (Effexor-XR) 37.5 mg 24 hr capsule TAKE ONE CAPSULE (37.5 MG) BY MOUTH EVERY MORNING WITH ONE 75 MG CAPSULE FOR A TOTAL DAILY DOSE .5 MG    venlafaxine XR (EFFEXOR-XR) 75 mg, Daily    Wegovy 0.5 mg, subcutaneous, Weekly      No issues reported in regards to accessibility, affordability, adherence, adverse effects, or organization    Objective   Lab Review  Lab Results   Component Value Date    HGBA1C 4.8 01/03/2025      Lab Results   Component Value Date    EGFR >90 02/13/2025    EGFR >90 " "02/04/2025    EGFR >90 02/03/2025     No components found for: \"UACR\"  Lab Results   Component Value Date    TRIG 103 02/01/2025    CHOL 164 02/01/2025    LDLCALC 99 02/01/2025    HDL 44.2 02/01/2025     Lab Results   Component Value Date    BILITOT 0.8 02/13/2025    CALCIUM 9.3 02/13/2025    CO2 26 02/13/2025     02/13/2025    CREATININE 0.83 02/13/2025    GLUCOSE 89 02/13/2025    ALKPHOS 187 (H) 02/13/2025    K 4.1 02/13/2025    PROT 6.9 02/13/2025     02/13/2025    AST 94 (H) 02/13/2025     (H) 02/13/2025    BUN 22 02/13/2025    ANIONGAP 14 02/13/2025    MG 2.02 02/13/2025    PHOS 3.8 02/02/2025     (H) 02/01/2025    ALBUMIN 4.0 02/13/2025    LIPASE 21 02/01/2025     BP Readings from Last 3 Encounters:   05/16/25 111/76   02/24/25 (!) 144/97   02/13/25 124/77     BMI Readings from Last 1 Encounters:   05/16/25 33.64 kg/m²     The 10-year ASCVD risk score (Koby LONG, et al., 2019) is: 8.5%    Values used to calculate the score:      Age: 64 years      Sex: Male      Is Non- : No      Diabetic: No      Tobacco smoker: No      Systolic Blood Pressure: 111 mmHg      Is BP treated: Yes      HDL Cholesterol: 44.2 mg/dL      Total Cholesterol: 164 mg/dL    PATIENT EDUCATION/GOALS  Counseled patient on Wegovy MOA, expectations, side effects, duration of therapy, administration, and monitoring parameters.  Provided detailed dosing and administration counseling to ensure proper technique.   Reviewed Wegovy titration schedule, starting with 0.25 mg once weekly to a goal of 2.4 mg once weekly if tolerated  Counseled patient on the benefits of GLP-1ra glycemic control and weight loss  Reviewed storage requirements of Wegovy when not in use, and when to administer the medication if a dose is missed.  Advised patient that they may experience improved satiety after meals and portion sizes of meals may be reduced as doses of Wegovy increase.  Discussed a minimal target goal of 5% " baseline body weight loss with a final target of 15% or more     Counseled patient on MOA, expectations, side effects, duration of therapy, contraindications, administration, and monitoring parameters  Answered all patient questions and concerns; provided [facility] phone number if issues/questions arise    Assessment/Plan   Problem List Items Addressed This Visit    None  Visit Diagnoses         Class 1 obesity due to excess calories with serious comorbidity and body mass index (BMI) of 34.0 to 34.9 in adult        Relevant Medications    semaglutide, weight loss, (Wegovy) 0.5 mg/0.5 mL pen injector    Other Relevant Orders    Referral to Clinical Pharmacy        Discussion: Jaron is doing well at this time. Since increasing to Wegovy 0.5 mg weekly, denies any nausea, vomiting, constipation, or diarrhea. He has effectively quit drinking. He has moments of lethargy. He has cleaned up his diet significantly and intakes adequate protein. Discussed options with Jaron, due to uncertainty with muscle pain likely associated with arthritis and continued weight-loss benefit, it would be best to continue on the current dose. Patient in agreement with plan - no questions at this time.  Plan:   Continue:   Wegovy 0.5 mg weekly  Follow-up: 4 weeks; 6/24 @ 11am  Future considerations: tolerance vs titration of Wegovy.  Refills: Yes, Wegovy 0.5 mg   PAP: expires 5/5/26    Ortega Valverde, PharmD  PGY-1 Pharmacy Resident

## 2025-06-10 DIAGNOSIS — M17.11 UNILATERAL PRIMARY OSTEOARTHRITIS, RIGHT KNEE: ICD-10-CM

## 2025-06-10 RX ORDER — GABAPENTIN 300 MG/1
300 CAPSULE ORAL 3 TIMES DAILY
Qty: 180 CAPSULE | Refills: 0 | Status: SHIPPED | OUTPATIENT
Start: 2025-06-10

## 2025-06-11 DIAGNOSIS — M19.90 INFLAMMATORY ARTHRITIS: Primary | ICD-10-CM

## 2025-06-12 DIAGNOSIS — M13.0 POLYARTHRITIS: Primary | ICD-10-CM

## 2025-06-12 RX ORDER — PREDNISONE 50 MG/1
50 TABLET ORAL DAILY
Qty: 5 TABLET | Refills: 0 | Status: SHIPPED | OUTPATIENT
Start: 2025-06-12 | End: 2025-06-17

## 2025-06-14 ASSESSMENT — RHEUMATOLOGY NEW PATIENT QUESTIONNAIRE
SUN SENSITIVE (SUN ALLERGY): N
SORES IN MOUTH OR NOSE: N
SKIN REDNESS: N
UNUSUALLY RAPID OR SLOWED HEART RATE: N
UNUSUAL BLEEDING: N
HEARTBURN OR REFLUX: N
EXCESSIVE HAIR LOSS (MORE THAN YOUR NORM): N
BLOOD IN STOOLS: N
DIFFICULTY BREATHING LYING DOWN: N
UNEXPLAINED HEARING LOSS: N
EASY BRUISING: Y
COUGH: N
SWOLLEN OR TENDER GLANDS: N
JOINT PAIN: Y
AGITATION: Y
PERSISTENT DIARRHEA: N
COLOR CHANGES OF HANDS OR FEET IN THE COLD: Y
NODULES/BUMPS: N
JAUNDICE: N
SKIN TIGHTNESS: N
EYE PAIN: N
FAINTING: N
CHEST PAIN: N
MEMORY LOSS: Y
SWOLLEN LEGS OR FEET: N
SEIZURES: N
DIFFICULTY FALLING ASLEEP: Y
EASILY LOSING TEMPER: N
ABNORMAL URINE: N
DEPRESSION: Y
DOUBLE OR BLURRED VISION: N
UNUSUAL FATIGUE: N
FEVER: N
LOSS OF VISION: N
STOMACH PAIN: N
NUMBNESS OR TINGLING IN HANDS OR FEET: Y
HOARSE VOICE: N
ANXIETY: Y
NAUSEA: N
RASH: N
VOMITING OF BLOOD OR COFFEE GROUND CONSISTENCY MATERIAL: N
DIFFICULTY STAYING ASLEEP: Y
EYE REDNESS: N
PAIN OR BURNING ON URINATION: N
SHORTNESS OF BREATH: N
MORNING STIFFNESS: Y
LOSS OF CONSCIOUSNESS: N
MUSCLE WEAKNESS: Y
INCREASED SUSCEPTIBILITY TO INFECTION: Y
DIFFICULTY SWALLOWING: N
ANEMIA: N
JOINT SWELLING: N
RASH OR ULCERS: N
HEADACHES: Y
NIGHT SWEATS: N
HOW WOULD YOU DESCRIBE YOUR STIFFNESS ON AVERAGE: SEVERE
MORNING STIFFNESS IN LOWER BACK: Y
EYE DRYNESS: N
DRYNESS OF MOUTH: Y
UNEXPLAINED WEIGHT CHANGE: N
BLACK STOOLS: N

## 2025-06-17 ENCOUNTER — APPOINTMENT (OUTPATIENT)
Dept: RHEUMATOLOGY | Facility: CLINIC | Age: 65
End: 2025-06-17
Payer: COMMERCIAL

## 2025-06-17 VITALS
BODY MASS INDEX: 33.11 KG/M2 | DIASTOLIC BLOOD PRESSURE: 84 MMHG | HEART RATE: 73 BPM | WEIGHT: 258 LBS | SYSTOLIC BLOOD PRESSURE: 128 MMHG | HEIGHT: 74 IN

## 2025-06-17 DIAGNOSIS — M19.90 INFLAMMATORY ARTHRITIS: Primary | ICD-10-CM

## 2025-06-17 PROCEDURE — 3074F SYST BP LT 130 MM HG: CPT | Performed by: INTERNAL MEDICINE

## 2025-06-17 PROCEDURE — 3008F BODY MASS INDEX DOCD: CPT | Performed by: INTERNAL MEDICINE

## 2025-06-17 PROCEDURE — 99204 OFFICE O/P NEW MOD 45 MIN: CPT | Performed by: INTERNAL MEDICINE

## 2025-06-17 PROCEDURE — 3079F DIAST BP 80-89 MM HG: CPT | Performed by: INTERNAL MEDICINE

## 2025-06-17 PROCEDURE — 1036F TOBACCO NON-USER: CPT | Performed by: INTERNAL MEDICINE

## 2025-06-17 RX ORDER — HYDROXYCHLOROQUINE SULFATE 200 MG/1
400 TABLET, FILM COATED ORAL DAILY
Qty: 180 TABLET | Refills: 1 | Status: SHIPPED | OUTPATIENT
Start: 2025-06-17 | End: 2025-12-14

## 2025-06-17 RX ORDER — PREDNISONE 5 MG/1
TABLET ORAL
Qty: 50 TABLET | Refills: 0 | Status: SHIPPED | OUTPATIENT
Start: 2025-06-17 | End: 2025-07-27

## 2025-06-17 ASSESSMENT — PAIN SCALES - GENERAL: PAINLEVEL_OUTOF10: 7

## 2025-06-17 NOTE — PROGRESS NOTES
"Subjective   Patient ID: Abhishek Olson \"Nir" is a 64 y.o. male who presents for New Patient Visit (New patient visit. Pain in lower back arms and hands).    HPI  63 yo male with joint pain  He has been using PRD for 5 days  His symptoms improved significantly  His pain started in his hands almost one and half month ago  Then, he had shoulder pain and then he had low back pain  He had hip replacement in 2007 and 2010 both sites  Also, he had right knee replacement  He had accidents and then compartment syndrome  Before PRD, does not remember any swollen joint  His AM stiffness lasts more than one hour  After PRD his symptoms improved almost 90%  No h/o psoriasis  He is having dry mouth, no dry eyes  He is having Raynaud like symptoms started many years ago  Denies smoking, quit alcohol 3 weeks ago  No h/o gout  Family h/o his aunt had arthritis, RA?  ESR 50, CRP 13.5  RF, CCP, ELVIN neg  He had thrombosis after knee surgery in 01/25    ROS  Joint pain in hands: negative   Joint swelling: negative  Morning stiffness and duration:   strength: normal  Oral ulcer: negative  Genital ulcer: negative  Raynaud phenomenon: negative  Chest pain/dyspnea: negative  Low back pain: negative  Visual problem: negative  Dry eyes/dry mouth: negative  Skin rash/scaling/psoriasis: negative       Objective     PEXAM  VS reviewed, WNL  General: Alert, no distress   HEENT: Normocephalic/atraumatic, No alopecia. PERRLA. Sclera white, conjunctiva pink, no malar rash. no oral or nasal ulcer. Oral cavity pink and moist, no erythema or exudate, dentition good.   Neck: supple  Respiratory: CTA B, no adventitious breath sounds  Cardiac: RRR, no murmurs, carotid, or bruits  Abdominal: symmetrical, soft, non-tender, non-distended, normoactive BSx4 quadrants, no CVA tenderness or suprapubic tenderness  MSK: Joints of upper and lower extremities were assessed for synovitis and ROM.    Today she has no evidence of synovitis in the joints of her " hands or wrists, tender joint count 0, swollen joint count 0   Extremities: no clubbing, no cyanosis, no edema  Skin: Skin warm and moist.   Neuro: non-focal, Strength 5/5 throughout. Normal gait. No cerebellar pathologic exam     Assessment/Plan     63 yo male with joint pain  His symptoms started 6 weeks ago  First, he had hand joint pain, then shoulder and LBP  His AM stiffness lasts longer than one hour  He has h/o B/L hip and right knee replacements due to mechanical issues, accidents  His PCP started 50 mg PRD and his symptoms improved 90%  PEXam did not reveal any synovitis today  ESR, cRP were elevated, but RF, CCP, ELVIN were neg  Liver enzymes were elevated, CBC showed anemia and thrombocytosis  Symptoms are not typical for PMR, he had more hand symptoms, almost no hip symptoms  Responded high dose steroid very well  May have seronegative RA  -will see his hand x-rays  -will use low dose PRD and HCQ, monitor his symptoms and acute phases  -will check his liver enzymes  -will see him in 5-6 weeks

## 2025-06-24 ENCOUNTER — APPOINTMENT (OUTPATIENT)
Dept: PHARMACY | Facility: HOSPITAL | Age: 65
End: 2025-06-24
Payer: COMMERCIAL

## 2025-06-24 DIAGNOSIS — E66.09 CLASS 1 OBESITY DUE TO EXCESS CALORIES WITH SERIOUS COMORBIDITY AND BODY MASS INDEX (BMI) OF 34.0 TO 34.9 IN ADULT: ICD-10-CM

## 2025-06-24 DIAGNOSIS — E66.811 CLASS 1 OBESITY DUE TO EXCESS CALORIES WITH SERIOUS COMORBIDITY AND BODY MASS INDEX (BMI) OF 34.0 TO 34.9 IN ADULT: ICD-10-CM

## 2025-06-24 RX ORDER — SEMAGLUTIDE 1 MG/.5ML
1 INJECTION, SOLUTION SUBCUTANEOUS WEEKLY
Qty: 2 ML | Refills: 0 | Status: SHIPPED | OUTPATIENT
Start: 2025-06-24 | End: 2025-07-16

## 2025-06-24 NOTE — PROGRESS NOTES
"    Clinical Pharmacy Visit    Patient ID: Abhishek Olson \"Nir" is a 64 y.o. male referred to clinical pharmacy for Weight Loss management. This is a follow-up visit.    At last pharmacy appointment, Wegovy was continued at 0.5 mg weekly.    Referring provider: Dakotah Frey DO    Subjective   Weight-loss Assessment    Diet: 2 meals per day   Lunch: protein shake   Dinner: home-cooked meal; protein incorporated   Snacks: later; trail mix   Drinks: mostly water - diet coke    Exercise: Discussed ADA recommendation of 150 minutes per week of moderate-intensity exercise.    Weight:   6/24/25 254 lbs   5/27/25 252 lbs   5/6/25 255 lbs   4/15/25 265.5 lbs   Goal: 210 - 220 lbs    Allergies   Allergen Reactions    Penicillins Rash     Current weight-loss pharmacotherapy:   Wegovy 0.5 mg once weekly    Pertinent PMH review:  PMH of Pancreatitis: No  PMH/FH of Medullary Thyroid Cancer: No  PMH/FH of Multiple Endocrine Neoplasia (MEN) Type II: No  PMH of Retinopathy: No    Drug Interactions:  No significant drug-drug interactions exist that require adjustment to therapy      Medication Review  Current Outpatient Medications   Medication Instructions    apixaban (ELIQUIS) 5 mg, oral, 2 times daily    diclofenac sodium (Voltaren) 1 % gel apply 4.5 inches topically 4 times a day    escitalopram (LEXAPRO) 20 mg, oral, Daily    gabapentin (NEURONTIN) 300 mg, oral, 3 times daily    hydroxychloroquine (PLAQUENIL) 400 mg, oral, Daily    lisinopriL-hydrochlorothiazide 20-25 mg tablet 1 tablet, oral, Daily    predniSONE (Deltasone) 5 mg tablet Take 2 tablets (10 mg) by mouth once daily for 10 days, THEN 1.5 tablets (7.5 mg) once daily for 10 days, THEN 1 tablet (5 mg) once daily for 10 days, THEN 0.5 tablets (2.5 mg) once daily for 10 days.    rosuvastatin (CRESTOR) 20 mg, oral, Daily    venlafaxine XR (Effexor-XR) 37.5 mg 24 hr capsule TAKE ONE CAPSULE (37.5 MG) BY MOUTH EVERY MORNING WITH ONE 75 MG CAPSULE FOR A TOTAL DAILY DOSE " ".5 MG    venlafaxine XR (EFFEXOR-XR) 75 mg, Daily    Wegovy 1 mg, subcutaneous, Weekly      No issues reported in regards to accessibility, affordability, adherence, adverse effects, or organization    Objective   Lab Review  Lab Results   Component Value Date    HGBA1C 4.8 01/03/2025      Lab Results   Component Value Date    EGFR >90 02/13/2025    EGFR >90 02/04/2025    EGFR >90 02/03/2025     No components found for: \"UACR\"  Lab Results   Component Value Date    TRIG 103 02/01/2025    CHOL 164 02/01/2025    LDLCALC 99 02/01/2025    HDL 44.2 02/01/2025     Lab Results   Component Value Date    BILITOT 0.8 02/13/2025    CALCIUM 9.3 02/13/2025    CO2 26 02/13/2025     02/13/2025    CREATININE 0.83 02/13/2025    GLUCOSE 89 02/13/2025    ALKPHOS 187 (H) 02/13/2025    K 4.1 02/13/2025    PROT 6.9 02/13/2025     02/13/2025    AST 94 (H) 02/13/2025     (H) 02/13/2025    BUN 22 02/13/2025    ANIONGAP 14 02/13/2025    MG 2.02 02/13/2025    PHOS 3.8 02/02/2025     (H) 02/01/2025    ALBUMIN 4.0 02/13/2025    LIPASE 21 02/01/2025     BP Readings from Last 3 Encounters:   06/17/25 128/84   05/16/25 111/76   02/24/25 (!) 144/97     BMI Readings from Last 1 Encounters:   06/17/25 33.13 kg/m²     The 10-year ASCVD risk score (Koyb LONG, et al., 2019) is: 10.9%    Values used to calculate the score:      Age: 64 years      Sex: Male      Is Non- : No      Diabetic: No      Tobacco smoker: No      Systolic Blood Pressure: 128 mmHg      Is BP treated: Yes      HDL Cholesterol: 44.2 mg/dL      Total Cholesterol: 164 mg/dL    PATIENT EDUCATION/GOALS  Counseled patient on Wegovy MOA, expectations, side effects, duration of therapy, administration, and monitoring parameters.  Provided detailed dosing and administration counseling to ensure proper technique.   Reviewed Wegovy titration schedule, starting with 0.25 mg once weekly to a goal of 2.4 mg once weekly if " tolerated  Counseled patient on the benefits of GLP-1ra glycemic control and weight loss  Reviewed storage requirements of Wegovy when not in use, and when to administer the medication if a dose is missed.  Advised patient that they may experience improved satiety after meals and portion sizes of meals may be reduced as doses of Wegovy increase.  Discussed a minimal target goal of 5% baseline body weight loss with a final target of 15% or more     Counseled patient on MOA, expectations, side effects, duration of therapy, contraindications, administration, and monitoring parameters  Answered all patient questions and concerns; provided [facility] phone number if issues/questions arise    Assessment/Plan   Problem List Items Addressed This Visit    None  Visit Diagnoses         Class 1 obesity due to excess calories with serious comorbidity and body mass index (BMI) of 34.0 to 34.9 in adult        Relevant Medications    semaglutide, weight loss, (Wegovy) 1 mg/0.5 mL pen injector    Other Relevant Orders    Referral to Clinical Pharmacy        Discussion: Jaron is doing well at this time. Since continuing Wegovy 0.5 mg weekly, denies any vomiting, constipation, or diarrhea. He had one episode of nausea after a pool party and likely a larger meal - no vomiting. May be worsened by skipping other medications except prednisone (which as improved his arthritis). He just refilled another box of 0.5 mg pens -> appropriate to increase at this time due to lack of adverse effects and stagnant weight loss. Will use up current supply then increase. Discussed with patient that with each dose increase there is a possibility of new or recurrent adverse effects. Jaron to reach out with any questions or concerns.  Plan:   Increase:   Wegovy 1 mg weekly  Follow-up: 8 weeks; 8/19 @ 11am  Future considerations: tolerance vs titration of Wegovy.  Refills: Not needed   PAP: expires 5/5/26    Ortega Valverde, PharmD  PGY-1 Pharmacy Resident

## 2025-06-30 ENCOUNTER — HOSPITAL ENCOUNTER (OUTPATIENT)
Dept: RADIOLOGY | Facility: CLINIC | Age: 65
Discharge: HOME | End: 2025-06-30
Payer: COMMERCIAL

## 2025-06-30 DIAGNOSIS — M19.90 INFLAMMATORY ARTHRITIS: ICD-10-CM

## 2025-06-30 PROCEDURE — 73120 X-RAY EXAM OF HAND: CPT | Mod: BILATERAL PROCEDURE | Performed by: RADIOLOGY

## 2025-06-30 PROCEDURE — 84165 PROTEIN E-PHORESIS SERUM: CPT

## 2025-06-30 PROCEDURE — 73120 X-RAY EXAM OF HAND: CPT | Mod: 50

## 2025-06-30 PROCEDURE — 86334 IMMUNOFIX E-PHORESIS SERUM: CPT

## 2025-07-01 ENCOUNTER — APPOINTMENT (OUTPATIENT)
Dept: LAB | Facility: HOSPITAL | Age: 65
End: 2025-07-01
Payer: COMMERCIAL

## 2025-07-01 LAB
ALBUMIN SERPL-MCNC: 4.4 G/DL (ref 3.6–5.1)
ALP SERPL-CCNC: 101 U/L (ref 35–144)
ALT SERPL-CCNC: 51 U/L (ref 9–46)
ANION GAP SERPL CALCULATED.4IONS-SCNC: 10 MMOL/L (CALC) (ref 7–17)
AST SERPL-CCNC: 27 U/L (ref 10–35)
BASOPHILS # BLD AUTO: 43 CELLS/UL (ref 0–200)
BASOPHILS NFR BLD AUTO: 0.5 %
BILIRUB SERPL-MCNC: 1.2 MG/DL (ref 0.2–1.2)
BUN SERPL-MCNC: 27 MG/DL (ref 7–25)
CALCIUM SERPL-MCNC: 9.6 MG/DL (ref 8.6–10.3)
CHLORIDE SERPL-SCNC: 99 MMOL/L (ref 98–110)
CO2 SERPL-SCNC: 29 MMOL/L (ref 20–32)
CREAT SERPL-MCNC: 0.93 MG/DL (ref 0.7–1.35)
CRP SERPL-MCNC: 7.1 MG/L
EGFRCR SERPLBLD CKD-EPI 2021: 93 ML/MIN/1.73M2
EOSINOPHIL # BLD AUTO: 181 CELLS/UL (ref 15–500)
EOSINOPHIL NFR BLD AUTO: 2.1 %
ERYTHROCYTE [DISTWIDTH] IN BLOOD BY AUTOMATED COUNT: 13.4 % (ref 11–15)
ERYTHROCYTE [SEDIMENTATION RATE] IN BLOOD BY WESTERGREN METHOD: 33 MM/H
GGT SERPL-CCNC: 199 U/L (ref 3–70)
GLUCOSE SERPL-MCNC: 76 MG/DL (ref 65–99)
HCT VFR BLD AUTO: 42.4 % (ref 38.5–50)
HGB BLD-MCNC: 13.8 G/DL (ref 13.2–17.1)
LYMPHOCYTES # BLD AUTO: 1488 CELLS/UL (ref 850–3900)
LYMPHOCYTES NFR BLD AUTO: 17.3 %
MCH RBC QN AUTO: 31.4 PG (ref 27–33)
MCHC RBC AUTO-ENTMCNC: 32.5 G/DL (ref 32–36)
MCV RBC AUTO: 96.4 FL (ref 80–100)
MONOCYTES # BLD AUTO: 636 CELLS/UL (ref 200–950)
MONOCYTES NFR BLD AUTO: 7.4 %
NEUTROPHILS # BLD AUTO: 6252 CELLS/UL (ref 1500–7800)
NEUTROPHILS NFR BLD AUTO: 72.7 %
PLATELET # BLD AUTO: 329 THOUSAND/UL (ref 140–400)
PMV BLD REES-ECKER: 9.4 FL (ref 7.5–12.5)
POTASSIUM SERPL-SCNC: 4.5 MMOL/L (ref 3.5–5.3)
PROT SERPL-MCNC: 7 G/DL (ref 6.1–8.1)
PROT SERPL-MCNC: 7.1 G/DL (ref 6.4–8.2)
RBC # BLD AUTO: 4.4 MILLION/UL (ref 4.2–5.8)
RHEUMATOID FACT SERPL-ACNC: <10 IU/ML
SODIUM SERPL-SCNC: 138 MMOL/L (ref 135–146)
WBC # BLD AUTO: 8.6 THOUSAND/UL (ref 3.8–10.8)

## 2025-07-03 LAB
ALBUMIN: 4.1 G/DL (ref 3.4–5)
ALPHA 1 GLOBULIN: 0.4 G/DL (ref 0.2–0.6)
ALPHA 2 GLOBULIN: 0.8 G/DL (ref 0.4–1.1)
BETA GLOBULIN: 0.9 G/DL (ref 0.5–1.2)
GAMMA GLOBULIN: 0.9 G/DL (ref 0.5–1.4)
IMMUNOFIXATION COMMENT: NORMAL
PATH REVIEW - SERUM IMMUNOFIXATION: NORMAL
PATH REVIEW-SERUM PROTEIN ELECTROPHORESIS: NORMAL
PROTEIN ELECTROPHORESIS COMMENT: NORMAL

## 2025-07-14 ENCOUNTER — APPOINTMENT (OUTPATIENT)
Dept: NEUROLOGY | Facility: CLINIC | Age: 65
End: 2025-07-14
Payer: COMMERCIAL

## 2025-07-15 ENCOUNTER — APPOINTMENT (OUTPATIENT)
Dept: RHEUMATOLOGY | Facility: CLINIC | Age: 65
End: 2025-07-15
Payer: COMMERCIAL

## 2025-07-15 VITALS
SYSTOLIC BLOOD PRESSURE: 120 MMHG | HEIGHT: 74 IN | HEART RATE: 86 BPM | WEIGHT: 252 LBS | DIASTOLIC BLOOD PRESSURE: 86 MMHG | BODY MASS INDEX: 32.34 KG/M2

## 2025-07-15 DIAGNOSIS — M19.90 INFLAMMATORY ARTHRITIS: Primary | ICD-10-CM

## 2025-07-15 DIAGNOSIS — M13.0 POLYARTHRITIS: Primary | ICD-10-CM

## 2025-07-15 PROCEDURE — 99213 OFFICE O/P EST LOW 20 MIN: CPT | Performed by: INTERNAL MEDICINE

## 2025-07-15 PROCEDURE — 1036F TOBACCO NON-USER: CPT | Performed by: INTERNAL MEDICINE

## 2025-07-15 PROCEDURE — 3008F BODY MASS INDEX DOCD: CPT | Performed by: INTERNAL MEDICINE

## 2025-07-15 PROCEDURE — 3079F DIAST BP 80-89 MM HG: CPT | Performed by: INTERNAL MEDICINE

## 2025-07-15 PROCEDURE — 3074F SYST BP LT 130 MM HG: CPT | Performed by: INTERNAL MEDICINE

## 2025-07-15 RX ORDER — VENLAFAXINE HYDROCHLORIDE 150 MG/1
1 CAPSULE, EXTENDED RELEASE ORAL
COMMUNITY
Start: 2025-06-25

## 2025-07-15 RX ORDER — PREDNISONE 5 MG/1
5 TABLET ORAL DAILY
Qty: 120 TABLET | Refills: 0 | Status: SHIPPED | OUTPATIENT
Start: 2025-07-15 | End: 2025-11-12

## 2025-07-15 ASSESSMENT — PAIN SCALES - GENERAL: PAINLEVEL_OUTOF10: 0-NO PAIN

## 2025-07-15 NOTE — PROGRESS NOTES
"Subjective   Patient ID: Abhishek Olson \"Nir" is a 64 y.o. male who presents for Follow-up.    HPI  65 yo male with joint pain  He has been using PRD for 5 days  His symptoms improved significantly  His pain started in his hands almost one and half month ago  Then, he had shoulder pain and then he had low back pain  He had hip replacement in 2007 and 2010 both sites  Also, he had right knee replacement  He had accidents and then compartment syndrome  Before PRD, does not remember any swollen joint  His AM stiffness lasts more than one hour  After PRD his symptoms improved almost 90%  No h/o psoriasis  He is having dry mouth, no dry eyes  He is having Raynaud like symptoms started many years ago  Denies smoking, quit alcohol 3 weeks ago  No h/o gout  Family h/o his aunt had arthritis, RA?    07/25:  We started PRD tapering and HCQ for seroneg inflammatory arthritis a few weeks ago  Tapered down his PRD dose to 5 mg daily and he is doing well  Denies any AM stiffness    Current meds:   mg daily  PRD 5 mg daily    ESR 50, CRP 13.5  RF, CCP, ELVIN neg  He had thrombosis after knee surgery in 01/25    ROS  Joint pain in hands: negative   Joint swelling: negative  Morning stiffness and duration:   strength: normal  Oral ulcer: negative  Genital ulcer: negative  Raynaud phenomenon: negative  Chest pain/dyspnea: negative  Low back pain: negative  Visual problem: negative  Dry eyes/dry mouth: negative  Skin rash/scaling/psoriasis: negative       Objective     PEXAM  VS reviewed, WNL  General: Alert, no distress   HEENT: Normocephalic/atraumatic, No alopecia. PERRLA. Sclera white, conjunctiva pink, no malar rash. no oral or nasal ulcer. Oral cavity pink and moist, no erythema or exudate, dentition good.   Neck: supple  Respiratory: CTA B, no adventitious breath sounds  Cardiac: RRR, no murmurs, carotid, or bruits  Abdominal: symmetrical, soft, non-tender, non-distended, normoactive BSx4 quadrants, no CVA tenderness " or suprapubic tenderness  MSK: Joints of upper and lower extremities were assessed for synovitis and ROM.    Today she has no evidence of synovitis in the joints of her hands or wrists, tender joint count 0, swollen joint count 0   Extremities: no clubbing, no cyanosis, no edema  Skin: Skin warm and moist.   Neuro: non-focal, Strength 5/5 throughout. Normal gait. No cerebellar pathologic exam     Assessment/Plan     65 yo male with joint pain  His symptoms started 6 weeks ago  First, he had hand joint pain, then shoulder and LBP  His AM stiffness lasts longer than one hour  He has h/o B/L hip and right knee replacements due to mechanical issues, accidents  His PCP started 50 mg PRD and his symptoms improved 90%  PEXam did not reveal any synovitis today  ESR, cRP were elevated, but RF, CCP, ELVIN were neg  Liver enzymes were elevated, CBC showed anemia and thrombocytosis  Symptoms are not typical for PMR, he had more hand symptoms, almost no hip symptoms  Responded high dose steroid very well  May have seronegative RA    07/25:  He is doing well after HCQ and steroid tapering  PEXam did not show any synovitis  Hand x-rays are NL  GGT is elevated, drink alcohol, but not so much  -will monitor LFT, GGT  -will taper off PRD slowly, continue HCQ  -Eye exam  -will see him in 3-4 months

## 2025-07-21 ENCOUNTER — PROCEDURE VISIT (OUTPATIENT)
Dept: NEUROLOGY | Facility: CLINIC | Age: 65
End: 2025-07-21
Payer: COMMERCIAL

## 2025-07-21 DIAGNOSIS — G43.709 CHRONIC MIGRAINE W/O AURA W/O STATUS MIGRAINOSUS, NOT INTRACTABLE: Primary | ICD-10-CM

## 2025-07-21 PROCEDURE — 64615 CHEMODENERV MUSC MIGRAINE: CPT | Performed by: STUDENT IN AN ORGANIZED HEALTH CARE EDUCATION/TRAINING PROGRAM

## 2025-07-21 PROCEDURE — 2500000004 HC RX 250 GENERAL PHARMACY W/ HCPCS (ALT 636 FOR OP/ED): Mod: JZ | Performed by: STUDENT IN AN ORGANIZED HEALTH CARE EDUCATION/TRAINING PROGRAM

## 2025-07-21 RX ADMIN — ONABOTULINUMTOXINA 195 UNITS: 100 INJECTION, POWDER, LYOPHILIZED, FOR SOLUTION INTRADERMAL; INTRAMUSCULAR at 15:07

## 2025-07-21 NOTE — PROGRESS NOTES
Subjective   Since last Botox, patient having improvement in severity by 30% in comparison to baseline. Denies any side effects to Botox.     Botox    Date/Time: 7/21/2025 2:54 PM    Performed by: Jens Boone DO  Authorized by: Jens Boone DO    Procedure specific details:      Procedure Note: PREEMPT Botox    Indications: Chronic Migraine    Informed consent was obtained (explaining the procedure and risks and benefits of procedure) from patient: the signed consent form was placed in the medical record.  A time out was completed, verifying correct patient, procedure,site, positioning, and implants or special equipment.    200 units of OnabotulinumtoxinA were reconstituted with saline. Sites of injection were identified via PREEMPT protocol and cleaned with alcohol pads. Using a 30 gauge needle, patient received following injections:    5 units in procerus  5 units in each left and right   10 units divided between 2 sites of L frontalis  10 units divided between 2 sites of R frontalis  20 units divided between 4 sites of L temporalis  20 units divided between 4 sites of R temporalis  15 units divided between 3 sites of L occipitalis  15 units divided between 3 sites of R occipitalis  10 units divided between 2 sites of L paracervical muscles  10 units divided between 2 sites of R paracervical muscles  15 units divided between 3 sites of L trapezius  15 units divided between 3 sites of R trapezius    Additional Sites:  10u L temporalis  10u R temporalis  10u L occipitalis  10u R occipitalis    Used: 195u  Wasted: 5u    There were no complications. Patient was comfortable and left without complaint.     OnabotulinumtoxinA  Lot #: [id number]  Exp: 9/2027

## 2025-07-28 ENCOUNTER — APPOINTMENT (OUTPATIENT)
Dept: PHARMACY | Facility: HOSPITAL | Age: 65
End: 2025-07-28
Payer: COMMERCIAL

## 2025-07-28 NOTE — PROGRESS NOTES
"    Clinical Pharmacy Visit    Patient ID: Abhishek Olson \"Nir" is a 64 y.o. male referred to clinical pharmacy for No chief complaint on file. management. This is a follow-up visit.    At last pharmacy appointment, Wegovy was continued at 0.5 mg weekly.    Referring provider: Dakotah Frey DO    Subjective   Weight-loss Assessment    Diet: 2 meals per day   Lunch: protein shake   Dinner: home-cooked meal; protein incorporated   Snacks: later; trail mix   Drinks: mostly water - diet coke    Exercise: Discussed ADA recommendation of 150 minutes per week of moderate-intensity exercise.    Weight:   7/28/25 252 lbs today   6/24/25 254 lbs    5/27/25 252 lbs    5/6/25 255 lbs    4/15/25 265.5 lbs baseline   Goal: 210 - 220 lbs    Allergies   Allergen Reactions    Penicillins Rash     Current weight-loss pharmacotherapy:   Wegovy 0.5 mg once weekly    Pertinent PMH review:  PMH of Pancreatitis: No  PMH/FH of Medullary Thyroid Cancer: No  PMH/FH of Multiple Endocrine Neoplasia (MEN) Type II: No  PMH of Retinopathy: No    Drug Interactions:  No significant drug-drug interactions exist that require adjustment to therapy      Medication Review  Current Outpatient Medications   Medication Instructions    apixaban (ELIQUIS) 5 mg, oral, 2 times daily    diclofenac sodium (Voltaren) 1 % gel apply 4.5 inches topically 4 times a day    escitalopram (LEXAPRO) 20 mg, oral, Daily    gabapentin (NEURONTIN) 300 mg, oral, 3 times daily    hydroxychloroquine (PLAQUENIL) 400 mg, oral, Daily    lisinopriL-hydrochlorothiazide 20-25 mg tablet 1 tablet, oral, Daily    predniSONE (DELTASONE) 5 mg, oral, Daily    rosuvastatin (CRESTOR) 20 mg, oral, Daily    venlafaxine XR (Effexor-XR) 150 mg 24 hr capsule 1 capsule, Daily (0630)    Wegovy 1 mg, subcutaneous, Weekly      No issues reported in regards to accessibility, affordability, adherence, adverse effects, or organization    Objective   Lab Review  Lab Results   Component Value Date    " "HGBA1C 4.8 01/03/2025      Lab Results   Component Value Date    EGFR 93 06/30/2025    EGFR >90 02/13/2025    EGFR >90 02/04/2025     No components found for: \"UACR\"  Lab Results   Component Value Date    TRIG 103 02/01/2025    CHOL 164 02/01/2025    LDLCALC 99 02/01/2025    HDL 44.2 02/01/2025     Lab Results   Component Value Date    BILITOT 1.2 06/30/2025    CALCIUM 9.6 06/30/2025    CO2 29 06/30/2025    CL 99 06/30/2025    CREATININE 0.93 06/30/2025    GLUCOSE 76 06/30/2025    ALKPHOS 101 06/30/2025    K 4.5 06/30/2025    PROT 7.1 06/30/2025     06/30/2025    AST 27 06/30/2025    ALT 51 (H) 06/30/2025    BUN 27 (H) 06/30/2025    ANIONGAP 10 06/30/2025    MG 2.02 02/13/2025    PHOS 3.8 02/02/2025     (H) 06/30/2025    ALBUMIN 4.4 06/30/2025    LIPASE 21 02/01/2025     BP Readings from Last 3 Encounters:   07/15/25 120/86   06/17/25 128/84   05/16/25 111/76     BMI Readings from Last 1 Encounters:   07/15/25 32.35 kg/m²     The 10-year ASCVD risk score (Koby LONG, et al., 2019) is: 9.7%    Values used to calculate the score:      Age: 64 years      Clincally relevant sex: Male      Is Non- : No      Diabetic: No      Tobacco smoker: No      Systolic Blood Pressure: 120 mmHg      Is BP treated: Yes      HDL Cholesterol: 44.2 mg/dL      Total Cholesterol: 164 mg/dL    PATIENT EDUCATION/GOALS  Counseled patient on Wegovy MOA, expectations, side effects, duration of therapy, administration, and monitoring parameters.  Provided detailed dosing and administration counseling to ensure proper technique.   Reviewed Wegovy titration schedule, starting with 0.25 mg once weekly to a goal of 2.4 mg once weekly if tolerated  Counseled patient on the benefits of GLP-1ra glycemic control and weight loss  Reviewed storage requirements of Wegovy when not in use, and when to administer the medication if a dose is missed.  Advised patient that they may experience improved satiety after meals and " portion sizes of meals may be reduced as doses of Wegovy increase.  Discussed a minimal target goal of 5% baseline body weight loss with a final target of 15% or more     Counseled patient on MOA, expectations, side effects, duration of therapy, contraindications, administration, and monitoring parameters  Answered all patient questions and concerns; provided [facility] phone number if issues/questions arise    Assessment/Plan   Problem List Items Addressed This Visit    None    Discussion: Jaron is doing well at this time. Since continuing Wegovy 0.5 mg weekly, denies any vomiting, constipation, or diarrhea. He had one episode of nausea after a pool party and likely a larger meal - no vomiting. May be worsened by skipping other medications except prednisone (which as improved his arthritis). He just refilled another box of 0.5 mg pens -> appropriate to increase at this time due to lack of adverse effects and stagnant weight loss. Will use up current supply then increase. Discussed with patient that with each dose increase there is a possibility of new or recurrent adverse effects. Jaron to reach out with any questions or concerns.  Plan:   Increase:   Wegovy 1 mg weekly  Follow-up: 8 weeks; 8/19 @ 11am  Future considerations: tolerance vs titration of Wegovy.  Refills: Not needed  [facility] PAP: expires 5/5/26    Ortega Valverde, PharmD  PGY-1 Pharmacy Resident

## 2025-07-29 ENCOUNTER — APPOINTMENT (OUTPATIENT)
Dept: RHEUMATOLOGY | Facility: CLINIC | Age: 65
End: 2025-07-29
Payer: COMMERCIAL

## 2025-08-07 DIAGNOSIS — F41.9 ANXIETY: ICD-10-CM

## 2025-08-07 RX ORDER — ESCITALOPRAM OXALATE 20 MG/1
20 TABLET ORAL DAILY
Qty: 30 TABLET | Refills: 0 | Status: SHIPPED | OUTPATIENT
Start: 2025-08-07

## 2025-08-11 ENCOUNTER — APPOINTMENT (OUTPATIENT)
Dept: GASTROENTEROLOGY | Facility: EXTERNAL LOCATION | Age: 65
End: 2025-08-11
Payer: COMMERCIAL

## 2025-08-15 DIAGNOSIS — M17.11 UNILATERAL PRIMARY OSTEOARTHRITIS, RIGHT KNEE: ICD-10-CM

## 2025-08-15 RX ORDER — GABAPENTIN 300 MG/1
300 CAPSULE ORAL 3 TIMES DAILY
Qty: 180 CAPSULE | Refills: 1 | Status: SHIPPED | OUTPATIENT
Start: 2025-08-15

## 2025-08-19 ENCOUNTER — APPOINTMENT (OUTPATIENT)
Dept: PHARMACY | Facility: HOSPITAL | Age: 65
End: 2025-08-19
Payer: COMMERCIAL

## 2025-08-19 ENCOUNTER — TELEMEDICINE (OUTPATIENT)
Dept: PHARMACY | Facility: HOSPITAL | Age: 65
End: 2025-08-19
Payer: COMMERCIAL

## 2025-08-19 DIAGNOSIS — E66.811 CLASS 1 OBESITY DUE TO EXCESS CALORIES WITH SERIOUS COMORBIDITY AND BODY MASS INDEX (BMI) OF 34.0 TO 34.9 IN ADULT: ICD-10-CM

## 2025-08-19 DIAGNOSIS — E66.09 CLASS 1 OBESITY DUE TO EXCESS CALORIES WITH SERIOUS COMORBIDITY AND BODY MASS INDEX (BMI) OF 34.0 TO 34.9 IN ADULT: ICD-10-CM

## 2025-08-19 RX ORDER — SEMAGLUTIDE 1 MG/.5ML
1 INJECTION, SOLUTION SUBCUTANEOUS WEEKLY
Qty: 2 ML | Refills: 0 | Status: SHIPPED | OUTPATIENT
Start: 2025-08-19

## 2025-09-04 ENCOUNTER — APPOINTMENT (OUTPATIENT)
Dept: PRIMARY CARE | Facility: CLINIC | Age: 65
End: 2025-09-04
Payer: COMMERCIAL

## 2025-09-04 ENCOUNTER — HOSPITAL ENCOUNTER (OUTPATIENT)
Dept: RADIOLOGY | Facility: CLINIC | Age: 65
Discharge: HOME | End: 2025-09-04
Payer: COMMERCIAL

## 2025-09-04 DIAGNOSIS — M25.511 ARTHRALGIA OF RIGHT ACROMIOCLAVICULAR JOINT: ICD-10-CM

## 2025-09-04 PROCEDURE — 73030 X-RAY EXAM OF SHOULDER: CPT | Mod: RT

## 2025-09-04 ASSESSMENT — ENCOUNTER SYMPTOMS
SHORTNESS OF BREATH: 0
LIGHT-HEADEDNESS: 0
DIZZINESS: 0
VOMITING: 0
FEVER: 0
NAUSEA: 0

## 2025-09-05 LAB
ALBUMIN SERPL-MCNC: 4.5 G/DL (ref 3.6–5.1)
ALBUMIN/GLOB SERPL: 1.7 (CALC) (ref 1–2.5)
ALP SERPL-CCNC: 81 U/L (ref 35–144)
ALT SERPL-CCNC: 40 U/L (ref 9–46)
AST SERPL-CCNC: 29 U/L (ref 10–35)
BILIRUB SERPL-MCNC: 0.9 MG/DL (ref 0.2–1.2)
BUN SERPL-MCNC: 22 MG/DL (ref 7–25)
BUN/CREAT SERPL: NORMAL (CALC) (ref 6–22)
CALCIUM SERPL-MCNC: 9.8 MG/DL (ref 8.6–10.3)
CHLORIDE SERPL-SCNC: 100 MMOL/L (ref 98–110)
CO2 SERPL-SCNC: 29 MMOL/L (ref 20–32)
CREAT SERPL-MCNC: 0.8 MG/DL (ref 0.7–1.35)
EGFRCR SERPLBLD CKD-EPI 2021: 100 ML/MIN/1.73M2
GLOBULIN SER CALC-MCNC: 2.6 G/DL (CALC) (ref 1.9–3.7)
GLUCOSE SERPL-MCNC: 86 MG/DL (ref 65–99)
POTASSIUM SERPL-SCNC: 4.4 MMOL/L (ref 3.5–5.3)
PROT SERPL-MCNC: 7.1 G/DL (ref 6.1–8.1)
SODIUM SERPL-SCNC: 137 MMOL/L (ref 135–146)

## 2025-09-16 ENCOUNTER — APPOINTMENT (OUTPATIENT)
Dept: PHARMACY | Facility: HOSPITAL | Age: 65
End: 2025-09-16
Payer: COMMERCIAL

## 2025-10-27 ENCOUNTER — APPOINTMENT (OUTPATIENT)
Dept: NEUROLOGY | Facility: CLINIC | Age: 65
End: 2025-10-27
Payer: COMMERCIAL

## 2025-10-28 ENCOUNTER — APPOINTMENT (OUTPATIENT)
Dept: RHEUMATOLOGY | Facility: CLINIC | Age: 65
End: 2025-10-28
Payer: COMMERCIAL

## 2025-11-04 ENCOUNTER — APPOINTMENT (OUTPATIENT)
Dept: OPHTHALMOLOGY | Facility: CLINIC | Age: 65
End: 2025-11-04
Payer: COMMERCIAL

## (undated) DEVICE — SUTURE, VICRYL, 0, 18 IN, CT-1, UNDYED

## (undated) DEVICE — SUTURE, MONOCRYL, 3-0, 27 IN, PS-2, UNDYED

## (undated) DEVICE — CHANNEL DRAIN, BARD, 15FR, ROUND HUBLESS, FULL FLUTED

## (undated) DEVICE — SUTURE, VICRYL, 2-0, 18 IN CP-2, UNDYED

## (undated) DEVICE — GLOVE, SURGICAL, PROTEXIS PI , 8.0, PF, LF

## (undated) DEVICE — Device

## (undated) DEVICE — SYRINGE, 60 CC, IRRIGATION, BULB, CONTRO-BULB, PAPER POUCH

## (undated) DEVICE — GLOVE, SURGICAL, PROTEXIS PI , 6.5, PF, LF

## (undated) DEVICE — BLANKET, LOWER BODY, VHA PLUS, ADULT

## (undated) DEVICE — HOOD, SURGICAL, FLYTE HYBRID

## (undated) DEVICE — NEEDLE, SPINAL, QUINCKE, 18 G X 3.5 IN, PINK HUB

## (undated) DEVICE — SYRINGE, 50 CC, LUER LOCK

## (undated) DEVICE — BANDAGE, COFLEX, 6 X 5 YDS, TAN, STERILE, LF

## (undated) DEVICE — STRIP, SKIN CLOSURE, STERI STRIP, REINFORCED, 0.5 X 4 IN

## (undated) DEVICE — CUFF, TOURNIQUET, 30 X 4, DUAL PORT/SNGL BLADDER, DISP, LF

## (undated) DEVICE — GLOVE, SURGICAL, PROTEXIS PI , 7.5, PF, LF

## (undated) DEVICE — DRESSING, MEPILEX, BORDER LIGHT, 3 X 3 IN

## (undated) DEVICE — TUBING, IRRIGATION, HIGH FLOW, HAND PIECE SET

## (undated) DEVICE — BLADE, SAW, DUAL SAGITTAL, 1.9 X 90 X 30

## (undated) DEVICE — DRAPE, ISOLATION, INCISE, W/POUCH, STERI DRAPE, 125 X 83 IN, DISPOSABLE, STERILE

## (undated) DEVICE — DRAPE, IRRIGATION, W/POUCH, ADHESIVE STRIP, STERI DRAPE, 19 X 23 IN, DISPOSABLE, STERILE

## (undated) DEVICE — CEMENT, MIXEVAC III, 10S BOWL, KNEES

## (undated) DEVICE — GLOVE, SURGICAL, PROTEXIS PI BLUE W/NEUTHERA, 7.0, PF, LF

## (undated) DEVICE — DRAIN, WOUND, ROUND, W/TROCAR, HOLE PATTERN, 10 IN, MEDIUM/LARGE, 3/16 X 49 IN

## (undated) DEVICE — SOLUTION, INJECTION, 0.9% SODIUM CHL, USP LIFECARE 1000 MI

## (undated) DEVICE — DRAPE, SHEET, U, W/ADHESIVE STRIP, IMPERVIOUS, 60 X 70 IN, DISPOSABLE, LF, STERILE

## (undated) DEVICE — SUTURE, ETHIBOND, P2, V-37, 30 IN, GREEN